# Patient Record
Sex: FEMALE | Race: WHITE | Employment: UNEMPLOYED | URBAN - METROPOLITAN AREA
[De-identification: names, ages, dates, MRNs, and addresses within clinical notes are randomized per-mention and may not be internally consistent; named-entity substitution may affect disease eponyms.]

---

## 2020-05-11 ENCOUNTER — TELEMEDICINE (OUTPATIENT)
Dept: FAMILY MEDICINE CLINIC | Age: 65
End: 2020-05-11
Payer: MEDICARE

## 2020-05-11 PROCEDURE — 99202 OFFICE O/P NEW SF 15 MIN: CPT | Performed by: FAMILY MEDICINE

## 2020-05-11 RX ORDER — LEVOTHYROXINE SODIUM 0.1 MG/1
100 TABLET ORAL DAILY
COMMUNITY
End: 2020-05-11 | Stop reason: SDUPTHER

## 2020-05-11 RX ORDER — HYDROCHLOROTHIAZIDE 25 MG/1
25 TABLET ORAL DAILY
COMMUNITY
End: 2020-05-11 | Stop reason: SDUPTHER

## 2020-05-11 RX ORDER — CITALOPRAM 40 MG/1
40 TABLET ORAL DAILY
Qty: 30 TABLET | Refills: 11 | Status: SHIPPED | OUTPATIENT
Start: 2020-05-11 | End: 2021-01-04 | Stop reason: SDUPTHER

## 2020-05-11 RX ORDER — LISINOPRIL 20 MG/1
40 TABLET ORAL DAILY
COMMUNITY
End: 2020-05-11 | Stop reason: ALTCHOICE

## 2020-05-11 RX ORDER — ATORVASTATIN CALCIUM 20 MG/1
20 TABLET, FILM COATED ORAL DAILY
COMMUNITY
Start: 2019-03-27 | End: 2020-05-11 | Stop reason: SDUPTHER

## 2020-05-11 RX ORDER — HYDROCHLOROTHIAZIDE 25 MG/1
25 TABLET ORAL DAILY
Qty: 30 TABLET | Refills: 11 | Status: SHIPPED | OUTPATIENT
Start: 2020-05-11 | End: 2021-01-04 | Stop reason: SDUPTHER

## 2020-05-11 RX ORDER — ATORVASTATIN CALCIUM 20 MG/1
20 TABLET, FILM COATED ORAL DAILY
Qty: 30 TABLET | Refills: 11 | Status: SHIPPED | OUTPATIENT
Start: 2020-05-11 | End: 2021-01-04 | Stop reason: SDUPTHER

## 2020-05-11 RX ORDER — LEVOTHYROXINE SODIUM 0.1 MG/1
100 TABLET ORAL DAILY
Qty: 30 TABLET | Refills: 11 | Status: SHIPPED | OUTPATIENT
Start: 2020-05-11 | End: 2021-01-04 | Stop reason: SDUPTHER

## 2020-05-11 RX ORDER — CITALOPRAM 40 MG/1
40 TABLET ORAL DAILY
COMMUNITY
End: 2020-05-11 | Stop reason: SDUPTHER

## 2020-08-24 ENCOUNTER — OFFICE VISIT (OUTPATIENT)
Dept: FAMILY MEDICINE CLINIC | Age: 65
End: 2020-08-24
Payer: MEDICARE

## 2020-08-24 VITALS
DIASTOLIC BLOOD PRESSURE: 83 MMHG | TEMPERATURE: 96.4 F | HEART RATE: 77 BPM | BODY MASS INDEX: 31.36 KG/M2 | WEIGHT: 177 LBS | HEIGHT: 63 IN | SYSTOLIC BLOOD PRESSURE: 137 MMHG

## 2020-08-24 PROCEDURE — G8417 CALC BMI ABV UP PARAM F/U: HCPCS | Performed by: FAMILY MEDICINE

## 2020-08-24 PROCEDURE — 4004F PT TOBACCO SCREEN RCVD TLK: CPT | Performed by: FAMILY MEDICINE

## 2020-08-24 PROCEDURE — 3017F COLORECTAL CA SCREEN DOC REV: CPT | Performed by: FAMILY MEDICINE

## 2020-08-24 PROCEDURE — 99213 OFFICE O/P EST LOW 20 MIN: CPT | Performed by: FAMILY MEDICINE

## 2020-08-24 PROCEDURE — G8427 DOCREV CUR MEDS BY ELIG CLIN: HCPCS | Performed by: FAMILY MEDICINE

## 2020-08-24 NOTE — PROGRESS NOTES
Visit Information    Have you changed or started any medications since your last visit including any over-the-counter medicines, vitamins, or herbal medicines? no   Have you stopped taking any of your medications? Is so, why? -  no  Are you having any side effects from any of your medications? - no    Have you seen any other physician or provider since your last visit?  no   Have you had any other diagnostic tests since your last visit?  no   Have you been seen in the emergency room and/or had an admission in a hospital since we last saw you?  no   Have you had your routine dental cleaning in the past 6 months?  no     Do you have an active MyChart account? If no, what is the barrier?   Yes    Patient Care Team:  Zuri Du DO as PCP - General (Family Medicine)    Medical History Review  Past Medical, Family, and Social History reviewed and does not contribute to the patient presenting condition    Health Maintenance   Topic Date Due    Potassium monitoring  1955    Creatinine monitoring  1955    Hepatitis C screen  1955    Lipid screen  10/11/1965    HIV screen  10/11/1970    DTaP/Tdap/Td vaccine (1 - Tdap) 10/11/1974    Cervical cancer screen  10/11/1976    Diabetes screen  10/11/1995    Breast cancer screen  10/11/2005    Shingles Vaccine (1 of 2) 10/11/2005    Colon cancer screen colonoscopy  10/11/2005    Annual Wellness Visit (AWV)  07/24/2020    Flu vaccine (1) 09/01/2020    Hepatitis A vaccine  Aged Out    Hepatitis B vaccine  Aged Out    Hib vaccine  Aged Out    Meningococcal (ACWY) vaccine  Aged Out    Pneumococcal 0-64 years Vaccine  Aged Jeanmarie

## 2020-08-24 NOTE — PROGRESS NOTES
Subjective:      Patient ID: Jo Larson is a 59 y.o. female. HPI    Check up  Patient reports Hx:  Clinical depression many years ago - has been very stable with citalopram  Has been on metformin - 1000 mg. Twice daily for DM   Needs labs caught up  Tobacco user - not ready to quit, but willing to consider it in the future    Review of Systems     Negative for:    Headache  Dizziness  Visual Disturbance  Hearing Changes  Nasal / sinus Symptoms  Mouth / tooth symptom, pain  Throat pain  Difficulty swallowing  Neck pain  Chest discomfort  Cough  SOB  N/V/D/C  Pelvic area discomfort  Bladder / voiding discomfort  Bowel complaints  MS complaints   Numbness/tingling/abnormal sensations   Edema / Leg swelling  Dizziness  Fatigue  Bleeding   Skin    Pertinent Pos: See HPI      Objective:   Physical Exam    Alert and oriented to PPT  NAD    HEENT - neg  Neck - no bruits, no lymphadenopathy  Chest - increased AP carlos consistent with OPD changes  HRRR w/o murmer  LCTAB no wheezes / rhonchi  Abdomen - soft, non-tender, BS  Extremities - 0+ PTE    Gait / Station - stable, no dysequilibrium, uniform pace, no assist device, cane. Assessment:       Diagnosis Orders   1. Diabetes mellitus due to underlying condition with hyperosmolarity without coma, unspecified whether long term insulin use (HCC)  Basic Metabolic Panel    metFORMIN (GLUCOPHAGE) 1000 MG tablet    TSH With Reflex Ft4    Hemoglobin A1C    Microalbumin, Ur   2. Encounter for screening mammogram for breast cancer     3. Screening for hyperlipidemia  Lipid Panel   4.  Mixed hyperlipidemia  Lipid Panel           Plan:      Reordered: metformin  Labs  If A1c is stable, will consider reducing metformin to once daily  Mag in 4 m      DPM  Labs  refill    Laxmi Samaniego DO

## 2020-09-09 ENCOUNTER — TELEPHONE (OUTPATIENT)
Dept: FAMILY MEDICINE CLINIC | Age: 65
End: 2020-09-09

## 2020-09-09 ENCOUNTER — TELEPHONE (OUTPATIENT)
Dept: SURGERY | Age: 65
End: 2020-09-09

## 2020-09-09 NOTE — TELEPHONE ENCOUNTER
Patient called in today asking if she can get a script sent over to her pharmacy for a uti. Patient sated she use over the counter azo and it gave relief but want to make sure she fully get rid of it.

## 2020-09-11 NOTE — TELEPHONE ENCOUNTER
Please return a call to the patient - offer an appointment with first available PGY Resident.     Marquis Barthel, DO

## 2020-09-16 ENCOUNTER — OFFICE VISIT (OUTPATIENT)
Dept: FAMILY MEDICINE CLINIC | Age: 65
End: 2020-09-16
Payer: MEDICARE

## 2020-09-16 ENCOUNTER — HOSPITAL ENCOUNTER (OUTPATIENT)
Age: 65
Setting detail: SPECIMEN
Discharge: HOME | End: 2020-09-16
Payer: MEDICARE

## 2020-09-16 VITALS
TEMPERATURE: 97.2 F | SYSTOLIC BLOOD PRESSURE: 163 MMHG | WEIGHT: 174 LBS | DIASTOLIC BLOOD PRESSURE: 99 MMHG | BODY MASS INDEX: 30.82 KG/M2

## 2020-09-16 PROBLEM — R30.0 DYSURIA: Status: ACTIVE | Noted: 2020-09-16

## 2020-09-16 PROBLEM — N39.0 URINARY TRACT INFECTION WITH HEMATURIA: Status: ACTIVE | Noted: 2020-09-16

## 2020-09-16 PROBLEM — R31.9 URINARY TRACT INFECTION WITH HEMATURIA: Status: ACTIVE | Noted: 2020-09-16

## 2020-09-16 PROBLEM — Z12.11 COLON CANCER SCREENING: Status: ACTIVE | Noted: 2020-09-16

## 2020-09-16 LAB
BILIRUBIN, POC: ABNORMAL
BLOOD URINE, POC: ABNORMAL
CLARITY, POC: ABNORMAL
COLOR, POC: YELLOW
GLUCOSE URINE, POC: NEGATIVE
KETONES, POC: ABNORMAL
LEUKOCYTE EST, POC: NEGATIVE
NITRITE, POC: NEGATIVE
PH, POC: 5.5
PROTEIN, POC: ABNORMAL
SPECIFIC GRAVITY, POC: >=1.03
UROBILINOGEN, POC: 0.2

## 2020-09-16 PROCEDURE — 3017F COLORECTAL CA SCREEN DOC REV: CPT | Performed by: STUDENT IN AN ORGANIZED HEALTH CARE EDUCATION/TRAINING PROGRAM

## 2020-09-16 PROCEDURE — 99213 OFFICE O/P EST LOW 20 MIN: CPT | Performed by: STUDENT IN AN ORGANIZED HEALTH CARE EDUCATION/TRAINING PROGRAM

## 2020-09-16 PROCEDURE — G8427 DOCREV CUR MEDS BY ELIG CLIN: HCPCS | Performed by: STUDENT IN AN ORGANIZED HEALTH CARE EDUCATION/TRAINING PROGRAM

## 2020-09-16 PROCEDURE — 81002 URINALYSIS NONAUTO W/O SCOPE: CPT | Performed by: STUDENT IN AN ORGANIZED HEALTH CARE EDUCATION/TRAINING PROGRAM

## 2020-09-16 PROCEDURE — G8417 CALC BMI ABV UP PARAM F/U: HCPCS | Performed by: STUDENT IN AN ORGANIZED HEALTH CARE EDUCATION/TRAINING PROGRAM

## 2020-09-16 PROCEDURE — 4004F PT TOBACCO SCREEN RCVD TLK: CPT | Performed by: STUDENT IN AN ORGANIZED HEALTH CARE EDUCATION/TRAINING PROGRAM

## 2020-09-16 RX ORDER — NITROFURANTOIN 25; 75 MG/1; MG/1
100 CAPSULE ORAL 2 TIMES DAILY
Qty: 10 CAPSULE | Refills: 0 | Status: SHIPPED | OUTPATIENT
Start: 2020-09-16 | End: 2020-09-21

## 2020-09-16 NOTE — PROGRESS NOTES
Subjective:    Edis Mendez is a 59 y.o. female with  has no past medical history on file. No family history on file. Presented tothe office today for:  Chief Complaint   Patient presents with    Dysuria     patient complains of uti symptoms, she says that for past 2 weeks she has had pressure and painful flow. She has tried azo for treatment with relief. But now she has issues with frequent flow and discomfort in lower back     Hypertension     takes htn medication at night    Health Maintenance     refused vaccines, due for awv, lab work incomplete     HPI  CC: UTI Symptoms  Edis Mendez is a 59 y.o. female who denies any urinary frequency, urgency and dysuria. She has some suprapubic tenderness and with bilateral flank pain. She denies any fever. She had chills a few times. No abnormal vaginal discharge or bleeding. Pain/discomfort is 4/10 in the flanks, Tylenol helps with her pain a little bit. She has a prior history of urinary stones in the past many years ago, and does not feel like it this time It feels very similar to prior UTI she had many years ago. LMP - years ago, unknown. s/p hysterectomy in 2005. No blood in the urine, urinating well at this time. She has been taking pyridium OTC with good relief of symptoms. Review of Systems  Review of Systems   Constitutional: Negative for activity change, appetite change, chills, diaphoresis, fatigue, fever and unexpected weight change. Cardiovascular: Negative for chest pain, palpitations and leg swelling. Gastrointestinal: Negative for abdominal pain, diarrhea, nausea and vomiting. Endocrine: Negative for cold intolerance, polydipsia, polyphagia and polyuria. Genitourinary: Negative for difficulty urinating,  frequency. Suprapubic pain and bilateral flank pain. Skin: Negative for color change and wound. Negative for pallor and rash. Allergic/Immunologic: Negative for environmental allergies and food allergies.    Psychiatric/Behavioral: Negative for sleep disturbance. Negative for confusion and suicidal ideas. Objective:    BP (!) 163/99   Temp 97.2 °F (36.2 °C)   Wt 174 lb (78.9 kg)   BMI 30.82 kg/m²    BP Readings from Last 3 Encounters:   09/16/20 (!) 163/99   08/24/20 137/83       Physical Exam  Constitutional: Patient is oriented to person, place, and time. Patient appears well-developed and well-nourished. No distress. HENT: Head: Normocephalic and atraumatic. Eyes: Pupils are equal, round, and reactive to light. Conjunctivae are normal. Right eye exhibits no discharge. Left eye exhibits no discharge. Cardiovascular: Normal rate, regular rhythm and normal heart sounds. Pulmonary/Chest: Effort normal and breath sounds normal. No respiratory distress. Patient has no wheezes. Abdominal: Soft. Bowel sounds are normal. Patient exhibits no distension. There is no tenderness. Musculoskeletal:  Patient exhibits no edema and tenderness. Patient exhibits no deformity. Neurological: Patient is alert and oriented to person, place, and time. Skin: Skin is warm and dry. Patient is not diaphoretic. Psychiatric: Patient's speech is normal and behavior is normal. Thought content normal. Patient's mood appears anxious. Vitals reviewed. The abdomen is soft withot tenderness, guarding, mass, rebound or organomegaly. No CVA tenderness or inguinal adenopathy noted. No results found for: WBC, HGB, HCT, PLT, CHOL, TRIG, HDL, LDLDIRECT, ALT, AST, NA, K, CL, CREATININE, BUN, CO2, TSH, PSA, INR, GLUF, LABA1C, LABMICR  No results found for: CALCIUM, PHOS  No results found for: LDLCALC, LDLCHOLESTEROL, LDLDIRECT      Assessment and Plan:    1. Urinary tract infection with hematuria, site unspecified/2. Dysuria  - POCT Urinalysis no Micro  - Culture, Urine; Future, Patient will be updated with results of the urine culture, modifying tx as needed. - nitrofurantoin, macrocrystal-monohydrate, (MACROBID) 100 MG capsule;  Take 1 capsule by mouth 2 times daily for 5 days  Dispense: 10 capsule; Refill: 0    3. Colon cancer screening  - Cologuard (For External Results Only); Future    Patient will obtain all of her labs that were ordered prior to determine any changes to Cr. Treatment per orders - also push fluids. Call or return to clinic/ED or 911/EMS prn if these symptoms worsen or fail to improve as anticipated. Requested Prescriptions     Signed Prescriptions Disp Refills    nitrofurantoin, macrocrystal-monohydrate, (MACROBID) 100 MG capsule 10 capsule 0     Sig: Take 1 capsule by mouth 2 times daily for 5 days       There are no discontinued medications. Sheryl Saavedra received counseling on the following healthy behaviors: nutrition, exercise and medication adherence    Discussed use, benefit, and side effects of prescribed medications. Barriers to medication compliance addressed. All patient questions answered. Pt voiced understanding, with use of teach-back method. Return in about 2 weeks (around 9/30/2020), or if symptoms worsen or fail to improve, for f/u UTI. Patient had a total hysterectomy (2/2 fibromas), cervix removed, does not follow with an OBGYN at this time.   Patient has an elevated blood pressure reading today, she is currently on antihypertensive medications, states that she took them all, we will repeat the blood pressure during the next appointment and if the blood pressures are improving we will increase and/or add a second medication to control her blood pressure

## 2020-09-16 NOTE — PROGRESS NOTES
Visit Information    Have you changed or started any medications since your last visit including any over-the-counter medicines, vitamins, or herbal medicines? no   Have you stopped taking any of your medications? Is so, why? -  no  Are you having any side effects from any of your medications? - no    Have you seen any other physician or provider since your last visit?  no   Have you had any other diagnostic tests since your last visit?  no   Have you been seen in the emergency room and/or had an admission in a hospital since we last saw you?  no   Have you had your routine dental cleaning in the past 6 months?  no     Do you have an active MyChart account? If no, what is the barrier?   Yes    Patient Care Team:  Francie Koehler DO as PCP - General (Family Medicine)  Francie Koehler DO as PCP - [facility]    Medical History Review  Past Medical, Family, and Social History reviewed and does not contribute to the patient presenting condition    Health Maintenance   Topic Date Due    Potassium monitoring  1955    Creatinine monitoring  1955    Hepatitis C screen  1955    Pneumococcal 0-64 years Vaccine (1 of 1 - PPSV23) 10/11/1961    Lipid screen  10/11/1965    HIV screen  10/11/1970    DTaP/Tdap/Td vaccine (1 - Tdap) 10/11/1974    Cervical cancer screen  10/11/1976    Diabetes screen  10/11/1995    Breast cancer screen  10/11/2005    Shingles Vaccine (1 of 2) 10/11/2005    Colon cancer screen colonoscopy  10/11/2005    Annual Wellness Visit (AWV)  07/24/2020    Flu vaccine (1) 09/01/2020    Hepatitis A vaccine  Aged Out    Hepatitis B vaccine  Aged Out    Hib vaccine  Aged Out    Meningococcal (ACWY) vaccine  Aged Out

## 2020-09-17 ENCOUNTER — HOSPITAL ENCOUNTER (OUTPATIENT)
Age: 65
Setting detail: SPECIMEN
Discharge: HOME | End: 2020-09-17
Payer: MEDICARE

## 2020-09-17 LAB
ANION GAP SERPL CALCULATED.3IONS-SCNC: 13 MMOL/L (ref 9–17)
BUN BLDV-MCNC: 13 MG/DL (ref 8–23)
BUN/CREAT BLD: ABNORMAL (ref 9–20)
CALCIUM SERPL-MCNC: 9.6 MG/DL (ref 8.6–10.4)
CHLORIDE BLD-SCNC: 100 MMOL/L (ref 98–107)
CHOLESTEROL/HDL RATIO: 4.8
CHOLESTEROL: 182 MG/DL
CO2: 25 MMOL/L (ref 20–31)
CREAT SERPL-MCNC: 0.51 MG/DL (ref 0.5–0.9)
CREATININE URINE: 226.1 MG/DL (ref 28–217)
ESTIMATED AVERAGE GLUCOSE: 209 MG/DL
GFR AFRICAN AMERICAN: >60 ML/MIN
GFR NON-AFRICAN AMERICAN: >60 ML/MIN
GFR SERPL CREATININE-BSD FRML MDRD: ABNORMAL ML/MIN/{1.73_M2}
GFR SERPL CREATININE-BSD FRML MDRD: ABNORMAL ML/MIN/{1.73_M2}
GLUCOSE BLD-MCNC: 200 MG/DL (ref 70–99)
HBA1C MFR BLD: 8.9 % (ref 4–6)
HDLC SERPL-MCNC: 38 MG/DL
LDL CHOLESTEROL DIRECT: 57 MG/DL
LDL CHOLESTEROL: ABNORMAL MG/DL (ref 0–130)
MICROALBUMIN/CREAT 24H UR: 649 MG/L
MICROALBUMIN/CREAT UR-RTO: 287 MCG/MG CREAT
POTASSIUM SERPL-SCNC: 4 MMOL/L (ref 3.7–5.3)
SODIUM BLD-SCNC: 138 MMOL/L (ref 135–144)
TRIGL SERPL-MCNC: 452 MG/DL
TSH SERPL DL<=0.05 MIU/L-ACNC: 4.94 MIU/L (ref 0.3–5)
VLDLC SERPL CALC-MCNC: ABNORMAL MG/DL (ref 1–30)

## 2020-09-17 NOTE — PROGRESS NOTES
Attending Physician Statement    Wt Readings from Last 3 Encounters:   09/16/20 174 lb (78.9 kg)   08/24/20 177 lb (80.3 kg)     Temp Readings from Last 3 Encounters:   09/16/20 97.2 °F (36.2 °C)   08/24/20 96.4 °F (35.8 °C) (Temporal)     BP Readings from Last 3 Encounters:   09/16/20 (!) 163/99   08/24/20 137/83     Pulse Readings from Last 3 Encounters:   08/24/20 77         I have discussed the care of [facility], including pertinent history and exam findings with the resident. I have reviewed the key elements of all parts of the encounter with the resident. I agree with the assessment, plan and orders as documented by the resident.   (GE Modifier)

## 2020-09-18 LAB
CULTURE: NORMAL
Lab: NORMAL
SPECIMEN DESCRIPTION: NORMAL

## 2020-10-02 ENCOUNTER — OFFICE VISIT (OUTPATIENT)
Dept: FAMILY MEDICINE CLINIC | Age: 65
End: 2020-10-02
Payer: MEDICARE

## 2020-10-02 VITALS
WEIGHT: 175.2 LBS | SYSTOLIC BLOOD PRESSURE: 148 MMHG | BODY MASS INDEX: 31.04 KG/M2 | HEIGHT: 63 IN | HEART RATE: 82 BPM | TEMPERATURE: 97 F | DIASTOLIC BLOOD PRESSURE: 98 MMHG

## 2020-10-02 PROCEDURE — 99211 OFF/OP EST MAY X REQ PHY/QHP: CPT | Performed by: FAMILY MEDICINE

## 2020-10-02 PROCEDURE — 3017F COLORECTAL CA SCREEN DOC REV: CPT | Performed by: FAMILY MEDICINE

## 2020-10-02 PROCEDURE — G0009 ADMIN PNEUMOCOCCAL VACCINE: HCPCS | Performed by: FAMILY MEDICINE

## 2020-10-02 PROCEDURE — G8417 CALC BMI ABV UP PARAM F/U: HCPCS | Performed by: FAMILY MEDICINE

## 2020-10-02 PROCEDURE — G8482 FLU IMMUNIZE ORDER/ADMIN: HCPCS | Performed by: FAMILY MEDICINE

## 2020-10-02 PROCEDURE — 4004F PT TOBACCO SCREEN RCVD TLK: CPT | Performed by: FAMILY MEDICINE

## 2020-10-02 PROCEDURE — 90715 TDAP VACCINE 7 YRS/> IM: CPT | Performed by: FAMILY MEDICINE

## 2020-10-02 PROCEDURE — 99213 OFFICE O/P EST LOW 20 MIN: CPT | Performed by: FAMILY MEDICINE

## 2020-10-02 PROCEDURE — G8427 DOCREV CUR MEDS BY ELIG CLIN: HCPCS | Performed by: FAMILY MEDICINE

## 2020-10-02 PROCEDURE — G0008 ADMIN INFLUENZA VIRUS VAC: HCPCS | Performed by: FAMILY MEDICINE

## 2020-10-02 NOTE — PROGRESS NOTES
Visit Information    Have you changed or started any medications since your last visit including any over-the-counter medicines, vitamins, or herbal medicines? no   Have you stopped taking any of your medications? Is so, why? -  no  Are you having any side effects from any of your medications? - no    Have you seen any other physician or provider since your last visit?  no   Have you had any other diagnostic tests since your last visit? yes - Labs   Have you been seen in the emergency room and/or had an admission in a hospital since we last saw you?  no   Have you had your routine dental cleaning in the past 6 months?  no     Do you have an active MyChart account? If no, what is the barrier?   Yes    Patient Care Team:  Romelia Hahn DO as PCP - General (Family Medicine)  Romelia Hahn DO as PCP - [facility] Provider    Medical History Review  Past Medical, Family, and Social History reviewed and does not contribute to the patient presenting condition    Health Maintenance   Topic Date Due    Hepatitis C screen  1955    Pneumococcal 0-64 years Vaccine (1 of 1 - PPSV23) 10/11/1961    Diabetic foot exam  10/11/1965    Diabetic retinal exam  10/11/1965    HIV screen  10/11/1970    DTaP/Tdap/Td vaccine (1 - Tdap) 10/11/1974    Cervical cancer screen  10/11/1976    Breast cancer screen  10/11/2005    Shingles Vaccine (1 of 2) 10/11/2005    Colon cancer screen colonoscopy  10/11/2005    Annual Wellness Visit (AWV)  07/24/2020    Flu vaccine (1) 09/01/2020    A1C test (Diabetic or Prediabetic)  09/17/2021    Diabetic microalbuminuria test  09/17/2021    Lipid screen  09/17/2021    Potassium monitoring  09/17/2021    Creatinine monitoring  09/17/2021    Hepatitis A vaccine  Aged Out    Hib vaccine  Aged Out    Meningococcal (ACWY) vaccine  Aged Out       Vaccine Information Sheet, \"Influenza - Inactivated\"  given to Amanda, or parent/legal guardian of  Amanda and verbalized understanding. Patient responses:    Have you ever had a reaction to a flu vaccine? No  Do you have any current illness? No  Have you ever had [place] Syndrome? No  Do you have a serious allergy to any of the following: Neomycin, Polymyxin, Thimerosal, eggs or egg products? No    Flu vaccine given per order. Please see immunization tab. Risks and benefits explained. Current VIS given.

## 2020-10-02 NOTE — PATIENT INSTRUCTIONS
Thank you for letting us take care of you today. We hope all your questions were addressed. If a question was overlooked or something else comes to mind after you return home, please contact a member of your Care Team listed below. Your Care Team at [place] [address] is Team #2  Lisa Álvarez DO (Faculty)  Tati Fishman (Faculty)  Seward Buerger, MD (Resident)  Stephany Crowley MD (Resident)  Alissa Cerna MD (Resident)  Ayush Alegria MD (Resident)  Fatoumata Calderon MD (Resident)  HIGINIO Reddy ,Deondre Martinbettei ([address] office)  Janet Guzman, [address] (Clinical Practice Manager)  Johan Hawkins [facility] (Clinical Pharmacist)     Office phone number: [phone]    If you need to get in right away due to illness, please be advised we have \"Same Day\" appointments available Monday-Friday. Please call us at [phone] option #3 to schedule your \"Same Day\" appointment. Patient Education        Tdap (Tetanus, Diphtheria, Pertussis) Vaccine: What You Need to Know  Why get vaccinated? Tdap vaccine can prevent tetanus, diphtheria, and pertussis. Diphtheria and pertussis spread from person to person. Tetanus enters the body through cuts or wounds. · TETANUS (T) causes painful stiffening of the muscles. Tetanus can lead to serious health problems, including being unable to open the mouth, having trouble swallowing and breathing, or death. · DIPHTHERIA (D) can lead to difficulty breathing, heart failure, paralysis, or death. · PERTUSSIS (aP), also known as \"whooping cough,\" can cause uncontrollable, violent coughing which makes it hard to breathe, eat, or drink. Pertussis can be extremely serious in babies and young children, causing pneumonia, convulsions, brain damage, or death. In teens and adults, it can cause weight loss, loss of bladder control, passing out, and rib fractures from severe coughing.   Tdap vaccine  Tdap is only for children 7 years and older, adolescents, and adults. Adolescents should receive a single dose of Tdap, preferably at age 6 or 15 years. Pregnant women should get a dose of Tdap during every pregnancy, to protect the  from pertussis. Infants are most at risk for severe, life threatening complications from pertussis. Adults who have never received Tdap should get a dose of Tdap. Also, adults should receive a booster dose every 10 years, or earlier in the case of a severe and dirty wound or burn. Booster doses can be either Tdap or Td (a different vaccine that protects against tetanus and diphtheria but not pertussis). Tdap may be given at the same time as other vaccines. Talk with your health care provider  Tell your vaccine provider if the person getting the vaccine:  · Has had an allergic reaction after a previous dose of any vaccine that protects against tetanus, diphtheria, or pertussis, or has any severe, life threatening allergies. · Has had a coma, decreased level of consciousness, or prolonged seizures within 7 days after a previous dose of any pertussis vaccine (DTP, DTaP, or Tdap). · Has seizures or another nervous system problem. · Has ever had Guillain-Barré Syndrome (also called GBS). · Has had severe pain or swelling after a previous dose of any vaccine that protects against tetanus or diphtheria. In some cases, your health care provider may decide to postpone Tdap vaccination to a future visit. People with minor illnesses, such as a cold, may be vaccinated. People who are moderately or severely ill should usually wait until they recover before getting Tdap vaccine. Your health care provider can give you more information. Risks of a vaccine reaction  · Pain, redness, or swelling where the shot was given, mild fever, headache, feeling tired, and nausea, vomiting, diarrhea, or stomachache sometimes happen after Tdap vaccine. People sometimes faint after medical procedures, including vaccination.  Tell your provider if you feel dizzy or have vision changes or ringing in the ears. As with any medicine, there is a very remote chance of a vaccine causing a severe allergic reaction, other serious injury, or death. What if there is a serious problem? An allergic reaction could occur after the vaccinated person leaves the clinic. If you see signs of a severe allergic reaction (hives, swelling of the face and throat, difficulty breathing, a fast heartbeat, dizziness, or weakness), call 9-1-1 and get the person to the nearest hospital.  For other signs that concern you, call your health care provider. Adverse reactions should be reported to the Vaccine Adverse Event Reporting System (VAERS). Your health care provider will usually file this report, or you can do it yourself. Visit the VAERS website at www.vaers. hhs.gov or call 6-278.610.9344. VAERS is only for reporting reactions, and VAERS staff do not give medical advice. The National Vaccine Injury Compensation Program  The National Vaccine Injury Compensation Program (VICP) is a federal program that was created to compensate people who may have been injured by certain vaccines. Visit the VICP website at www.hrsa.gov/vaccinecompensation or call 9-366.150.9394 to learn about the program and about filing a claim. There is a time limit to file a claim for compensation. How can I learn more? · Ask your health care provider. · Call your local or state health department. · Contact the Centers for Disease Control and Prevention (CDC):  ? Call 4-480.899.6535 (1-800-CDC-INFO) or  ? Visit CDC's website at www.cdc.gov/vaccines  Vaccine Information Statement (Interim)  Tdap (Tetanus, Diphtheria, Pertussis) Vaccine  04/01/2020  [id number]  Department of Health and Human Services  Centers for Disease Control and Prevention  Many Vaccine Information Statements are available in Welsh and other languages. See www.immunize.org/vis.   Muchas hojas de información sobre vacunas están disponibles en español y en otros idiomas. Visite www.immunize.org/vis. Care instructions adapted under license by [facility] ([facility]). If you have questions about a medical condition or this instruction, always ask your healthcare professional. [address] any warranty or liability for your use of this information. Patient Education        Influenza (Flu) Vaccine (Inactivated or Recombinant): What You Need to Know  Why get vaccinated? Influenza vaccine can prevent influenza (flu). Flu is a contagious disease that spreads around the [place] every year, usually between October and May. Anyone can get the flu, but it is more dangerous for some people. Infants and young children, people 72years of age and older, pregnant women, and people with certain health conditions or a weakened immune system are at greatest risk of flu complications. Pneumonia, bronchitis, sinus infections and ear infections are examples of flu-related complications. If you have a medical condition, such as heart disease, cancer or diabetes, flu can make it worse. Flu can cause fever and chills, sore throat, muscle aches, fatigue, cough, headache, and runny or stuffy nose. Some people may have vomiting and diarrhea, though this is more common in children than adults. Each year, thousands of people in the [facility] die from flu, and many more are hospitalized. Flu vaccine prevents millions of illnesses and flu-related visits to the doctor each year. Influenza vaccine  CDC recommends everyone 10months of age and older get vaccinated every flu season. Children 6 months through 6years of age may need 2 doses during a single flu season. Everyone else needs only 1 dose each flu season. It takes about 2 weeks for protection to develop after vaccination. There are many flu viruses, and they are always changing.  Each year a new flu vaccine is made to protect against three or four viruses that are likely to cause disease in the upcoming flu season. Even when the vaccine doesn't exactly match these viruses, it may still provide some protection. Influenza vaccine does not cause flu. Influenza vaccine may be given at the same time as other vaccines. Talk with your health care provider  Tell your vaccine provider if the person getting the vaccine:  · Has had an allergic reaction after a previous dose of influenza vaccine, or has any severe, life-threatening allergies. · Has ever had Guillain-Barré Syndrome (also called GBS). In some cases, your health care provider may decide to postpone influenza vaccination to a future visit. People with minor illnesses, such as a cold, may be vaccinated. People who are moderately or severely ill should usually wait until they recover before getting influenza vaccine. Your health care provider can give you more information. Risks of a vaccine reaction  · Soreness, redness, and swelling where shot is given, fever, muscle aches, and headache can happen after influenza vaccine. · There may be a very small increased risk of Guillain-Barré Syndrome (GBS) after inactivated influenza vaccine (the flu shot). [employer] children who get the flu shot along with pneumococcal vaccine (PCV13), and/or DTaP vaccine at the same time might be slightly more likely to have a seizure caused by fever. Tell your health care provider if a child who is getting flu vaccine has ever had a seizure. People sometimes faint after medical procedures, including vaccination. Tell your provider if you feel dizzy or have vision changes or ringing in the ears. As with any medicine, there is a very remote chance of a vaccine causing a severe allergic reaction, other serious injury, or death. What if there is a serious problem? An allergic reaction could occur after the vaccinated person leaves the clinic.  If you see signs of a severe allergic reaction (hives, swelling of the face and throat, difficulty breathing, a fast heartbeat, dizziness, or weakness), call 9-1-1 and get the person to the nearest hospital.  For other signs that concern you, call your health care provider. Adverse reactions should be reported to the Vaccine Adverse Event Reporting System (VAERS). Your health care provider will usually file this report, or you can do it yourself. Visit the VAERS website at www.vaers. [facility].gov or call 7-747.223.4215. VAERS is only for reporting reactions, and VAERS staff do not give medical advice. The National Vaccine Injury Compensation Program  The National Vaccine Injury Compensation Program (VICP) is a federal program that was created to compensate people who may have been injured by certain vaccines. Visit the VICP website at www.[facility].gov/vaccinecompensation or call 9-332.265.1236 to learn about the program and about filing a claim. There is a time limit to file a claim for compensation. How can I learn more? · Ask your healthcare provider. · Call your local or state health department. · Contact the Centers for Disease Control and Prevention (CDC):  ? Call 2-926.967.2985 (1-800-CDC-INFO) or  ? Visit CDC's website at www.cdc.gov/flu  Vaccine Information Statement (Interim)  Inactivated Influenza Vaccine  8/15/2019  [address] [id number]  Department of Health and Human Services  Centers for Disease Control and Prevention  Many Vaccine Information Statements are available in Samoan and other languages. See www.immunize.org/vis. Muchas hojas de información sobre vacunas están disponibles en español y en otros idiomas. Visite www.immunize.org/vis. Care instructions adapted under license by [facility] ([facility]). If you have questions about a medical condition or this instruction, always ask your healthcare professional. [address] any warranty or liability for your use of this information.          Patient Education        pneumococcal polysaccharides vaccine (PPSV), 23-valent  Pronunciation:  GLEN Underwood harmony, Selene-ENEDINA pittman  Brand:  Pneumovax 23  What is the most important information I should know about this vaccine? PPSV should be given at least 2 weeks before the start of any treatment that can weaken your immune system. PPSV is also given at least 2 weeks before you undergo a splenectomy (surgical removal of the spleen). The timing of this vaccination is very important for it to be effective. Follow your doctor's instructions. You can still receive a vaccine if you have a cold or fever. In the case of a more severe illness with a fever or any type of infection, wait until you get better before receiving this vaccine. You should not receive a booster vaccine if you had a life-threatening allergic reaction after the first shot. Keep track of any and all side effects you have after receiving this vaccine. If you ever need to receive a booster dose, you will need to tell your doctor if the previous shot caused any side effects. Becoming infected with pneumococcal disease (such as pneumonia or meningitis) is much more dangerous to your health than receiving this vaccine. However, like any medicine, this vaccine can cause side effects but the risk of serious side effects is extremely low. What is pneumococcal polysaccharides vaccine (PPSV)? Pneumococcal disease is a serious infection caused by a bacteria. Pneumococcal bacteria can infect the sinuses and inner ear. It can also infect the lungs, blood, and brain and these conditions can be fatal.  Pneumococcal polysaccharides vaccine (PPSV) is used to prevent infection caused by pneumococcal bacteria. PPSV contains 23 of the most common types of pneumococcal bacteria. PPSV works by exposing you to a small dose of the bacteria or a protein from the bacteria, which causes your body to develop immunity to the disease. PPSV will not treat an active infection that has already developed in the body.   PPSV is for use only in adults and children who are at least 2 years doctor if you are breast-feeding a baby. How is this vaccine given? PPSV is given as an injection (shot) under the skin or into a muscle of your arm or thigh. You will receive this injection in a doctor's office or other clinic setting. PPSV is usually given as a routine vaccination in adults who are 72 years and older. PPSV may also be given to people between the ages 3and 59years old who have:  · heart disease, lung disease, or diabetes;  · a cerebrospinal fluid leak, or a cochlear implant (an electronic hearing device);  · alcoholism or liver disease (including cirrhosis);  · sickle cell disease or a disorder of the spleen;  · a weak immune system caused by HIV, AIDS, cancer, kidney failure, organ transplantation, or a damaged spleen; or  · a weak immune system caused by taking steroids or receiving chemotherapy or radiation treatment. PPSV may also be given to people between the ages 23and 59years old who smoke or have asthma. PPSV should be given at least 2 weeks before the start of any treatment that can weaken your immune system. PPSV is also given at least 2 weeks before you undergo a splenectomy (surgical removal of the spleen). The timing of this vaccination is very important for it to be effective. Follow your doctor's instructions. Your doctor may recommend treating fever and pain with an aspirin-free pain reliever such as acetaminophen (Tylenol) or ibuprofen (Motrin, Advil, and others) when the shot is given and for the next 24 hours. Follow the label directions or your doctor's instructions about how much of this medicine to take. If your doctor has prescribed an antibiotic (such as penicillin) to help prevent infection with pneumococcal bacteria, do not stop using the antibiotic after you receive the PPSV. Take the antibiotic for the entire length of time prescribed by your doctor. Most people receive only one PPSV shot during their lifetime.  However, people in certain age groups or with vision, speech, swallowing, or bladder and bowel functions; or  · slow heart rate, trouble breathing, feeling like you might pass out. Less serious side effects are more likely to occur, such as:  · low fever (102 degrees or less), chills, tired feeling;  · swelling, pain, tenderness, or redness anywhere on your body;  · headache, nausea, vomiting;  · joint or muscle pain;  · swelling or stiffness in the arm or leg the vaccine was injected into;  · mild skin rash; or  · mild soreness, warmth, redness, swelling, or a hard lump where the shot was given. This is not a complete list of side effects and others may occur. Call your doctor for medical advice about side effects. You may report vaccine side effects to the [place] [address] and Human Services at 7-692.614.5485. What other drugs will affect pneumococcal polysaccharides vaccine (PPSV)? Before receiving this vaccine, tell the doctor about all other vaccines you have recently received. Also tell the doctor if you have recently received drugs or treatments that can weaken the immune system, including:  · an oral, nasal, inhaled, or injectable steroid medicine;  · medications to treat psoriasis, rheumatoid arthritis, or other autoimmune disorders, such as azathioprine (Imuran), etanercept (Enbrel), leflunomide ([address]), and others; or  · medicines to treat or prevent organ transplant rejection, such as basiliximab (Simulect), cyclosporine (Sandimmune, Neoral, Gengraf), muromonab-CD3 (Orthoclone), mycophenolate mofetil (CellCept), sirolimus (Rapamune), or tacrolimus (Prograf). If you are using any of these medications, you may not be able to receive the vaccine, or may need to wait until the other treatments are finished. This list is not complete and other drugs may interact with PPSV. Tell your doctor about all medications you use. This includes prescription, over-the-counter, vitamin, and herbal products.  Do not start a new medication without telling your doctor. Where can I get more information? Your doctor or pharmacist may have additional information about pneumococcal polysaccharides vaccine. You may also find additional information from your local health department or the Centers for Disease Control and Prevention. Remember, keep this and all other medicines out of the reach of children, never share your medicines with others, and use this medication only for the indication prescribed. Every effort has been made to ensure that the information provided by [address] is accurate, up-to-date, and complete, but no guarantee is made to that effect. Drug information contained herein may be time sensitive. [facility] information has been compiled for use by healthcare practitioners and consumers in the [place] and therefore [facility] does not warrant that uses outside of the [place] are appropriate, unless specifically indicated otherwise. [facility]'s drug information does not endorse drugs, diagnose patients or recommend therapy. [facility]'s drug information is an informational resource designed to assist licensed healthcare practitioners in caring for their patients and/or to serve consumers viewing this service as a supplement to, and not a substitute for, the expertise, skill, knowledge and judgment of healthcare practitioners. The absence of a warning for a given drug or drug combination in no way should be construed to indicate that the drug or drug combination is safe, effective or appropriate for any given patient. [facility] does not assume any responsibility for any aspect of healthcare administered with the aid of information [facility] provides. The information contained herein is not intended to cover all possible uses, directions, precautions, warnings, drug interactions, allergic reactions, or adverse effects.  If you have questions about the drugs you are taking, check with your doctor, nurse or pharmacist.  Copyright 3064-6284 1 [employer] Version: 5.02. Revision date: 10/17/2012. Care instructions adapted under license by [facility] ([facility]). If you have questions about a medical condition or this instruction, always ask your healthcare professional. [address] any warranty or liability for your use of this information.

## 2020-10-02 NOTE — PROGRESS NOTES
Subjective:      Patient ID: Prabhu Braxton is a 59 y.o. female. HPI    Check up  HM update  Discussed stressors    Review of Systems     Negative for:     Worry / mood complaints  Headache  Dizziness  Visual Disturbance  Hearing Changes  Nasal / sinus Symptoms  Mouth / tooth symptom, pain  Throat pain  Difficulty swallowing  Neck pain  Chest discomfort  Cough  SOB  N/V/D/C  Pelvic area discomfort  Bladder / voiding discomfort  Bowel complaints  MS complaints   Numbness/tingling/abnormal sensations   Edema / Leg swelling  Dizziness  Fatigue  Bleeding   Skin    Pertinent Pos: See HPI      Objective:   Physical Exam    Alert and oriented to PPT  NAD    HEENT - neg  Neck - no bruits, no lymphadenopathy  Chest  HRRR w/o murmer  LCTAB no wheezes / rhonchi  Abdomen - soft, non-tender, BS  Extremities - 0+ PTE    Gait / Station - stable, no dysequilibrium, uniform pace, no assist device, cane. Assessment:       Diagnosis Orders   1. Essential hypertension     2. Fatigue due to excessive exertion, sequela     3. Needs flu shot  INFLUENZA, QUADV, 3 YRS AND OLDER, IM PF, PREFILL SYR OR SDV, 0.5ML (AFLURIA QUADV, PF)   4. Need for pneumococcal vaccine  Pneumococcal polysaccharide vaccine 23-valent greater than or equal to 1yo subcutaneous/IM   5. Need for Tdap vaccination  Tdap (age 6y and older) IM (Boostrix)           Plan:      See orders.       Recheck office visit - 0-3 m           Bri Stallings DO

## 2020-10-16 PROBLEM — Z12.11 COLON CANCER SCREENING: Status: RESOLVED | Noted: 2020-09-16 | Resolved: 2020-10-16

## 2020-10-19 ENCOUNTER — OFFICE VISIT (OUTPATIENT)
Dept: FAMILY MEDICINE CLINIC | Age: 65
End: 2020-10-19
Payer: MEDICARE

## 2020-10-19 VITALS
WEIGHT: 174 LBS | TEMPERATURE: 96.9 F | RESPIRATION RATE: 18 BRPM | DIASTOLIC BLOOD PRESSURE: 84 MMHG | HEIGHT: 62 IN | HEART RATE: 80 BPM | BODY MASS INDEX: 32.02 KG/M2 | OXYGEN SATURATION: 99 % | SYSTOLIC BLOOD PRESSURE: 138 MMHG

## 2020-10-19 PROCEDURE — 4040F PNEUMOC VAC/ADMIN/RCVD: CPT | Performed by: FAMILY MEDICINE

## 2020-10-19 PROCEDURE — 1123F ACP DISCUSS/DSCN MKR DOCD: CPT | Performed by: FAMILY MEDICINE

## 2020-10-19 PROCEDURE — G8482 FLU IMMUNIZE ORDER/ADMIN: HCPCS | Performed by: FAMILY MEDICINE

## 2020-10-19 PROCEDURE — 3017F COLORECTAL CA SCREEN DOC REV: CPT | Performed by: FAMILY MEDICINE

## 2020-10-19 PROCEDURE — G0439 PPPS, SUBSEQ VISIT: HCPCS | Performed by: FAMILY MEDICINE

## 2020-10-19 ASSESSMENT — LIFESTYLE VARIABLES: HOW OFTEN DO YOU HAVE A DRINK CONTAINING ALCOHOL: 0

## 2020-10-19 ASSESSMENT — PATIENT HEALTH QUESTIONNAIRE - PHQ9
SUM OF ALL RESPONSES TO PHQ9 QUESTIONS 1 & 2: 2
SUM OF ALL RESPONSES TO PHQ QUESTIONS 1-9: 2
1. LITTLE INTEREST OR PLEASURE IN DOING THINGS: 1
SUM OF ALL RESPONSES TO PHQ QUESTIONS 1-9: 2
SUM OF ALL RESPONSES TO PHQ QUESTIONS 1-9: 2
2. FEELING DOWN, DEPRESSED OR HOPELESS: 1

## 2020-10-19 NOTE — PROGRESS NOTES
mass index is 31.83 kg/m². Based upon direct observation of the patient, evaluation of cognition reveals recent and remote memory intact. Patient's complete Health Risk Assessment and screening values have been reviewed and are found in Flowsheets. The following problems were reviewed today and where indicated follow up appointments were made and/or referrals ordered. Positive Risk Factor Screenings with Interventions:     Substance History:  Social History     Tobacco History     Smoking Status  Current Some Day Smoker Smoking Frequency  0.5 packs/day for 52 years (26 pk yrs) Smoking Tobacco Type  Cigarettes    Smokeless Tobacco Use  Never Used          Alcohol History     Alcohol Use Status  Not Asked          Drug Use     Drug Use Status  Not Asked          Sexual Activity     Sexually Active  Not Asked               Alcohol Screening:       A score of 8 or more is associated with harmful or hazardous drinking. A score of 13 or more in women, and 15 or more in men, is likely to indicate alcohol dependence. Substance Abuse Interventions:  · Tobacco abuse:  tobacco cessation tips and resources provided    General Health and ACP:  General  In general, how would you say your health is?: (!) Poor(soreness in her entire body chronic)  In the past 7 days, have you experienced any of the following?  New or Increased Pain, New or Increased Fatigue, Loneliness, Social Isolation, Stress or Anger?: (!) New or Increased Fatigue, Stress(chronic fatique and son in rehab)  Do you get the social and emotional support that you need?: (!) No(from friends  sometimes)  Do you have a Living Will?: (!) No(copy given to complete)  Advance Directives     Power of [facility] Will ACP-Advance Directive ACP-Power of     Not on File Not on File Filed [address] [facility] [place] Risk Interventions:  · No Living Will:  Copy given will bring back     Health Habits/Nutrition:  Health Habits/Nutrition  Do you exercise for at least 20 minutes 2-3 times per week?: Yes(walking dogs)  Have you lost any weight without trying in the past 3 months?: No  Do you eat fewer than 2 meals per day?: No  Have you seen a dentist within the past year?: (!) No(list given)  Body mass index: (!) 31.82  Health Habits/Nutrition Interventions:  · Dental exam overdue:  patient encouraged to make appointment with his/her dentist    Hearing/Vision:  No exam data present  Hearing/Vision  Do you or your family notice any trouble with your hearing?: (!) Yes  Do you have difficulty driving, watching TV, or doing any of your daily activities because of your eyesight?: No  Have you had an eye exam within the past year?: (!) No(list given)  Hearing/Vision Interventions:  · Vision concerns:  patient encouraged to make appointment with his/her eye specialist    Personalized Preventive Plan   Current Health Maintenance Status  Immunization History   Administered Date(s) Administered    Influenza, Quadv, IM, PF (6 mo and older Fluzone, Flulaval, Fluarix, and 3 yrs and older Afluria) 10/02/2020    Pneumococcal Polysaccharide ([id number]) 10/02/2020    Tdap (Boostrix, Adacel) 10/02/2020        Health Maintenance   Topic Date Due    Hepatitis C screen  1955    Diabetic foot exam  10/11/1965    Diabetic retinal exam  10/11/1965    HIV screen  10/11/1970    Cervical cancer screen  10/11/1976    Breast cancer screen  10/11/2005    Shingles Vaccine (1 of 2) 10/11/2005    Colon cancer screen colonoscopy  10/11/2005    DEXA (modify frequency per FRAX score)  10/11/2010    Annual Wellness Visit (AWV)  07/24/2020    A1C test (Diabetic or Prediabetic)  09/17/2021    Diabetic microalbuminuria test  09/17/2021    Lipid screen  09/17/2021    Potassium monitoring  09/17/2021    Creatinine monitoring  09/17/2021    Pneumococcal 65+ years Vaccine (1 of 1 - PPSV23) 10/02/2025    DTaP/Tdap/Td vaccine (2 - Td) 10/02/2030    Flu vaccine  Completed    Hepatitis A vaccine  Aged Out    Hib vaccine  Aged Out    Meningococcal (ACWY) vaccine  Aged Out     Recommendations for advisorCONNECT Due: see orders and patient instructions/AVS.  . Recommended screening schedule for the next 5-10 years is provided to the patient in written form: see Patient Instructions/AVS.    Luis Marrero LPN, [id number]/[id number], performed the documented evaluation under the direct supervision of the attending physician.

## 2020-10-19 NOTE — PATIENT INSTRUCTIONS
Patient Education        Learning About Benefits From Quitting Smoking  How does quitting smoking make you healthier? If you're thinking about quitting smoking, you may have a few reasons to be smoke-free. Your health may be one of them. · When you quit smoking, you lower your risks for cancer, lung disease, heart attack, stroke, blood vessel disease, and blindness from macular degeneration. · When you're smoke-free, you get sick less often, and you heal faster. You are less likely to get colds, flu, bronchitis, and pneumonia. · As a nonsmoker, you may find that your mood is better and you are less stressed. When and how will you feel healthier? Quitting has real health benefits that start from day 1 of being smoke-free. And the longer you stay smoke-free, the healthier you get and the better you feel. The first hours  · After just 20 minutes, your blood pressure and heart rate go down. That means there's less stress on your heart and blood vessels. · Within 12 hours, the level of carbon monoxide in your blood drops back to normal. That makes room for more oxygen. With more oxygen in your body, you may notice that you have more energy than when you smoked. After 2 weeks  · Your lungs start to work better. · Your risk of heart attack starts to drop. After 1 month  · When your lungs are clear, you cough less and breathe deeper, so it's easier to be active. · Your sense of taste and smell return. That means you can enjoy food more than you have since you started smoking. Over the years  · Over the years, your risks of heart disease, heart attack, and stroke are lower. · After 10 years, your risk of dying from lung cancer is cut by about half. And your risk for many other types of cancer is lower too. How would quitting help others in your life? When you quit smoking, you improve the health of everyone who now breathes in your smoke.   · Their heart, lung, and cancer risks drop, much like yours.  · They are sick less. For babies and small children, living smoke-free means they're less likely to have ear infections, pneumonia, and bronchitis. · If you're a woman who is or will be pregnant someday, quitting smoking means a healthier . · Children who are close to you are less likely to become adult smokers. Where can you learn more? Go to https://chpepiceweb.3Touch. org and sign in to your [employer] account. Enter [phone] in the [facility] box to learn more about \"Learning About Benefits From Quitting Smoking. \"     If you do not have an account, please click on the \"Sign Up Now\" link. Current as of: 2020               Content Version: 12.6  © 3088-3667 [employer]. Care instructions adapted under license by [facility] ([facility]). If you have questions about a medical condition or this instruction, always ask your healthcare professional. [place] [address] any warranty or liability for your use of this information. Personalized Preventive Plan for Adriano Bush - 10/19/2020  Medicare offers a range of preventive health benefits. Some of the tests and screenings are paid in full while other may be subject to a deductible, co-insurance, and/or copay. Some of these benefits include a comprehensive review of your medical history including lifestyle, illnesses that may run in your family, and various assessments and screenings as appropriate. After reviewing your medical record and screening and assessments performed today your provider may have ordered immunizations, labs, imaging, and/or referrals for you. A list of these orders (if applicable) as well as your Preventive Care list are included within your After Visit Summary for your review.     Other Preventive Recommendations:    · A preventive eye exam performed by an eye specialist is recommended every 1-2 years to screen for glaucoma; cataracts, macular degeneration, and other eye disorders. · A preventive dental visit is recommended every 6 months. · Try to get at least 150 minutes of exercise per week or 10,000 steps per day on a pedometer . · Order or download the FREE \"Exercise & Physical Activity: Your Everyday Guide\" from [employer]. Call 3-216.381.4234 or search [employer] online. · You need 2930-3778 mg of calcium and 4880-3507 IU of vitamin D per day. It is possible to meet your calcium requirement with diet alone, but a vitamin D supplement is usually necessary to meet this goal.  · When exposed to the sun, use a sunscreen that protects against both UVA and UVB radiation with an SPF of 30 or greater. Reapply every 2 to 3 hours or after sweating, drying off with a towel, or swimming. · Always wear a seat belt when traveling in a car. Always wear a helmet when riding a bicycle or motorcycle.

## 2020-11-06 ENCOUNTER — OFFICE VISIT (OUTPATIENT)
Dept: FAMILY MEDICINE CLINIC | Age: 65
End: 2020-11-06
Payer: MEDICARE

## 2020-11-06 VITALS
BODY MASS INDEX: 31.8 KG/M2 | SYSTOLIC BLOOD PRESSURE: 153 MMHG | TEMPERATURE: 96.7 F | HEIGHT: 62 IN | WEIGHT: 172.8 LBS | HEART RATE: 81 BPM | DIASTOLIC BLOOD PRESSURE: 91 MMHG

## 2020-11-06 PROCEDURE — 4004F PT TOBACCO SCREEN RCVD TLK: CPT | Performed by: FAMILY MEDICINE

## 2020-11-06 PROCEDURE — G8400 PT W/DXA NO RESULTS DOC: HCPCS | Performed by: FAMILY MEDICINE

## 2020-11-06 PROCEDURE — 1123F ACP DISCUSS/DSCN MKR DOCD: CPT | Performed by: FAMILY MEDICINE

## 2020-11-06 PROCEDURE — G8427 DOCREV CUR MEDS BY ELIG CLIN: HCPCS | Performed by: FAMILY MEDICINE

## 2020-11-06 PROCEDURE — 99213 OFFICE O/P EST LOW 20 MIN: CPT | Performed by: FAMILY MEDICINE

## 2020-11-06 PROCEDURE — 3017F COLORECTAL CA SCREEN DOC REV: CPT | Performed by: FAMILY MEDICINE

## 2020-11-06 PROCEDURE — 4040F PNEUMOC VAC/ADMIN/RCVD: CPT | Performed by: FAMILY MEDICINE

## 2020-11-06 PROCEDURE — 1090F PRES/ABSN URINE INCON ASSESS: CPT | Performed by: FAMILY MEDICINE

## 2020-11-06 PROCEDURE — G8417 CALC BMI ABV UP PARAM F/U: HCPCS | Performed by: FAMILY MEDICINE

## 2020-11-06 PROCEDURE — G8482 FLU IMMUNIZE ORDER/ADMIN: HCPCS | Performed by: FAMILY MEDICINE

## 2020-11-06 PROCEDURE — 99211 OFF/OP EST MAY X REQ PHY/QHP: CPT | Performed by: FAMILY MEDICINE

## 2020-11-06 RX ORDER — LISINOPRIL 20 MG/1
20 TABLET ORAL DAILY
Qty: 30 TABLET | Refills: 5 | Status: SHIPPED | OUTPATIENT
Start: 2020-11-06 | End: 2021-01-04 | Stop reason: SDUPTHER

## 2020-11-06 NOTE — PATIENT INSTRUCTIONS
Thank you for letting us take care of you today. We hope all your questions were addressed. If a question was overlooked or something else comes to mind after you return home, please contact a member of your Care Team listed below. Your Care Team at [place] [address] is Team #2  Rigoberto Forte DO (Faculty)  Juan C Renee (Faculty)  Janessa Jackson MD (Resident)  Willa Darby MD (Resident)  Mere Moreno MD (Resident)  Carey Meckel, MD (Resident)  Cm Garcia MD (Resident)  HIGINIO Mcbride ,ANNE-MARIE GILL [facility] ([address] [facility] office)  Dee Dee Rawls, [address] (Clinical Practice Manager)  Noel Castanon [facility] (Clinical Pharmacist)     Office phone number: [phone]    If you need to get in right away due to illness, please be advised we have \"Same Day\" appointments available Monday-Friday. Please call us at [phone] option #3 to schedule your \"Same Day\" appointment.

## 2020-11-06 NOTE — PROGRESS NOTES
Subjective:      Patient ID: Ming Logan is a 72 y.o. female. HPI    Lurdes Bravo presents the office today for follow-up. She is feeling overwhelmed lately with regards to feelings for her son. She states her son has a chemical dependency problem and she is still struggling to recover and go into sobriety. We have discussed her blood pressure today which is approximately 10 to 15 points above goal.        Review of Systems   Constitutional: Negative for chills and fever. HENT: Negative for sore throat. Eyes: Negative for pain. Respiratory: Negative for cough and shortness of breath. Cardiovascular: Negative for chest pain, palpitations and leg swelling. Gastrointestinal: Negative for constipation, nausea and vomiting. Genitourinary: Negative for dysuria. Musculoskeletal: Negative for back pain and myalgias. Skin: Negative for rash. Neurological: Negative for dizziness and weakness. Hematological: Does not bruise/bleed easily. Psychiatric/Behavioral: Negative for suicidal ideas. The patient is not nervous/anxious. Objective:   Physical Exam  Vitals signs reviewed. Constitutional:       Appearance: Normal appearance. Cardiovascular:      Rate and Rhythm: Normal rate and regular rhythm. Pulmonary:      Effort: Pulmonary effort is normal.      Breath sounds: Normal breath sounds. Skin:     General: Skin is warm and dry. Neurological:      General: No focal deficit present. Mental Status: She is alert. Psychiatric:         Mood and Affect: Mood normal.         Thought Content: Thought content normal.      Comments: Worry          Vitals:    11/06/20 0921   BP: (!) 153/91   Pulse: 81   Temp:      155/90 ankur    Assessment:       Diagnosis Orders   1. Essential hypertension  lisinopril (PRINIVIL;ZESTRIL) 20 MG tablet           Plan:      Add lisinopril at this time and follow up in two weeks to review blood pressure.       Goal is to get systolic readings below [address] [place] [address], [facility] Mike Noyola, DO

## 2020-11-06 NOTE — PROGRESS NOTES
09/17/2021    Creatinine monitoring  09/17/2021    Annual Wellness Visit (AWV)  10/20/2021    Pneumococcal 65+ years Vaccine (1 of 1 - PPSV23) 10/02/2025    DTaP/Tdap/Td vaccine (2 - Td) 10/02/2030    Flu vaccine  Completed    Hepatitis A vaccine  Aged Out    Hib vaccine  Aged Out    Meningococcal (ACWY) vaccine  Aged Out

## 2020-11-08 ASSESSMENT — ENCOUNTER SYMPTOMS
SORE THROAT: 0
CONSTIPATION: 0
SHORTNESS OF BREATH: 0
COUGH: 0
BACK PAIN: 0
NAUSEA: 0
VOMITING: 0
EYE PAIN: 0

## 2020-11-27 ENCOUNTER — TELEPHONE (OUTPATIENT)
Dept: FAMILY MEDICINE CLINIC | Age: 65
End: 2020-11-27

## 2020-11-27 NOTE — TELEPHONE ENCOUNTER
Call from patient stating she is on two blood pressure medication wondering if it is ok to take them, because she was propose to see provider on 12/14/2020 but was cancel did receive letter in mail of cancellation. and wanted provider to know her son Tanya Bush is at [facility], and would like provider to check on her son in hospital @ [facility]. She would like to know if her son is doing good. Was ask if she call hospital she stated no. She would like if provider  can give her a call if you can. [phone].     Please review and advise

## 2020-11-27 NOTE — TELEPHONE ENCOUNTER
Please call Ok Morillo - advise her I have not seen her son today in the hospital. Is he still admitted or did he perhaps go home ?     Thank-you

## 2020-12-11 ENCOUNTER — TELEPHONE (OUTPATIENT)
Dept: FAMILY MEDICINE CLINIC | Age: 65
End: 2020-12-11

## 2020-12-11 NOTE — LETTER
[address]  [address]  [address] [place] [address]  Phone: [phone]  Fax: [phone]    Gus Sexton DO        December 11, 2020      Re: My patient  Louis Duran  [address]    Please be advised I am the treating physician for Louis Duran. Nina Batista meets criteria for consideration of emotional support animal as per my opinion. I provided care for her now approximately 9 months and I would offer that this is well within the request considerations of completing her total health including her emotional health and recognized by the Americans With Disabilities act. If you have any questions or concerns, please don't hesitate to call.     Sincerely,        Gus Sexton DO

## 2020-12-11 NOTE — TELEPHONE ENCOUNTER
Called pt to schedule nurse visit for blood pressure check. Patient wanted to check the status for a medical statement needed for a support animal or form, said she discuss with Dr Cristiana Chinchilla a couple of months ago during her son stay in the hospital. Please review and advise. Next Visit Date:  Future Appointments   Date Time Provider Ned Mishra   12/15/2020  4:00 PM NURSE, JASSI PRO ALL DAY [facility]   12/22/2020  2:00  [facility] DIGITAL RM ST[facility] MAMMO [address] [facility] Radiolog       Health Maintenance   Topic Date Due    Hepatitis C screen  1955    Diabetic foot exam  10/11/1965    Diabetic retinal exam  10/11/1965    HIV screen  10/11/1970    Cervical cancer screen  10/11/1976    Breast cancer screen  10/11/2005    Shingles Vaccine (1 of 2) 10/11/2005    Colon cancer screen colonoscopy  10/11/2005    DEXA (modify frequency per FRAX score)  10/11/2010    A1C test (Diabetic or Prediabetic)  09/17/2021    Diabetic microalbuminuria test  09/17/2021    Lipid screen  09/17/2021    Potassium monitoring  09/17/2021    Creatinine monitoring  09/17/2021    Annual Wellness Visit (AWV)  10/20/2021    Pneumococcal 65+ years Vaccine (1 of 1 - PPSV23) 10/02/2025    DTaP/Tdap/Td vaccine (2 - Td) 10/02/2030    Flu vaccine  Completed    Hepatitis A vaccine  Aged Out    Hib vaccine  Aged Out    Meningococcal (ACWY) vaccine  Aged Out       Hemoglobin A1C (%)   Date Value   09/17/2020 8.9 (H)             ( goal A1C is < 7)   Microalb/Crt.  Ratio (mcg/mg creat)   Date Value   09/17/2020 287 (H)     LDL Cholesterol (mg/dL)   Date Value   09/17/2020            (goal LDL is <100)   BUN (mg/dL)   Date Value   09/17/2020 13     BP Readings from Last 3 Encounters:   11/06/20 (!) 153/91   10/19/20 138/84   10/02/20 (!) 148/98          (goal 120/80)    All Future Testing planned in [facility]  Lab Frequency Next Occurrence   [facility] DIGITAL SCREEN W OR WO CAD BILATERAL Once 11/11/2021   Cologuard (For External Results Only) Once 09/16/2021               Patient Active Problem List:     Dysuria     Urinary tract infection with hematuria

## 2020-12-15 ENCOUNTER — TELEPHONE (OUTPATIENT)
Dept: FAMILY MEDICINE CLINIC | Age: 65
End: 2020-12-15

## 2020-12-15 ENCOUNTER — NURSE ONLY (OUTPATIENT)
Dept: FAMILY MEDICINE CLINIC | Age: 65
End: 2020-12-15
Payer: MEDICARE

## 2020-12-15 VITALS — HEART RATE: 78 BPM | SYSTOLIC BLOOD PRESSURE: 134 MMHG | TEMPERATURE: 98 F | DIASTOLIC BLOOD PRESSURE: 80 MMHG

## 2020-12-15 NOTE — PROGRESS NOTES
patient here today for a blood pressure check from 11/06/20 appointment with Dr. David Coto as a follow up for change in blood pressure medications. Blood pressure taken via machine and manually. Blood pressure 134/80 manually. Patient denies any complaints of symptoms such as headache, blurred vision, nausea, lightheadedness.  Patient states she feels much better

## 2021-01-04 ENCOUNTER — OFFICE VISIT (OUTPATIENT)
Dept: FAMILY MEDICINE CLINIC | Age: 66
End: 2021-01-04
Payer: MEDICARE

## 2021-01-04 VITALS
HEIGHT: 62 IN | DIASTOLIC BLOOD PRESSURE: 79 MMHG | TEMPERATURE: 96.4 F | SYSTOLIC BLOOD PRESSURE: 110 MMHG | OXYGEN SATURATION: 95 % | RESPIRATION RATE: 18 BRPM | BODY MASS INDEX: 31.65 KG/M2 | WEIGHT: 172 LBS | HEART RATE: 89 BPM

## 2021-01-04 DIAGNOSIS — F32.A DEPRESSION, UNSPECIFIED DEPRESSION TYPE: ICD-10-CM

## 2021-01-04 DIAGNOSIS — E03.8 HYPOTHYROIDISM DUE TO HASHIMOTO'S THYROIDITIS: ICD-10-CM

## 2021-01-04 DIAGNOSIS — E08.00 DIABETES MELLITUS DUE TO UNDERLYING CONDITION WITH HYPEROSMOLARITY WITHOUT COMA, UNSPECIFIED WHETHER LONG TERM INSULIN USE (HCC): ICD-10-CM

## 2021-01-04 DIAGNOSIS — I10 ESSENTIAL HYPERTENSION: ICD-10-CM

## 2021-01-04 DIAGNOSIS — Z23 NEED FOR PROPHYLACTIC VACCINATION AND INOCULATION AGAINST VARICELLA: Primary | ICD-10-CM

## 2021-01-04 DIAGNOSIS — E06.3 HYPOTHYROIDISM DUE TO HASHIMOTO'S THYROIDITIS: ICD-10-CM

## 2021-01-04 DIAGNOSIS — E78.2 MIXED HYPERLIPIDEMIA: ICD-10-CM

## 2021-01-04 PROCEDURE — 1123F ACP DISCUSS/DSCN MKR DOCD: CPT | Performed by: FAMILY MEDICINE

## 2021-01-04 PROCEDURE — 1090F PRES/ABSN URINE INCON ASSESS: CPT | Performed by: FAMILY MEDICINE

## 2021-01-04 PROCEDURE — G8417 CALC BMI ABV UP PARAM F/U: HCPCS | Performed by: FAMILY MEDICINE

## 2021-01-04 PROCEDURE — G8428 CUR MEDS NOT DOCUMENT: HCPCS | Performed by: FAMILY MEDICINE

## 2021-01-04 PROCEDURE — 3017F COLORECTAL CA SCREEN DOC REV: CPT | Performed by: FAMILY MEDICINE

## 2021-01-04 PROCEDURE — 99213 OFFICE O/P EST LOW 20 MIN: CPT | Performed by: FAMILY MEDICINE

## 2021-01-04 PROCEDURE — 4040F PNEUMOC VAC/ADMIN/RCVD: CPT | Performed by: FAMILY MEDICINE

## 2021-01-04 PROCEDURE — 4004F PT TOBACCO SCREEN RCVD TLK: CPT | Performed by: FAMILY MEDICINE

## 2021-01-04 PROCEDURE — G8400 PT W/DXA NO RESULTS DOC: HCPCS | Performed by: FAMILY MEDICINE

## 2021-01-04 PROCEDURE — G8482 FLU IMMUNIZE ORDER/ADMIN: HCPCS | Performed by: FAMILY MEDICINE

## 2021-01-04 RX ORDER — ATORVASTATIN CALCIUM 20 MG/1
20 TABLET, FILM COATED ORAL DAILY
Qty: 30 TABLET | Refills: 11 | Status: SHIPPED | OUTPATIENT
Start: 2021-01-04 | End: 2022-01-03 | Stop reason: SDUPTHER

## 2021-01-04 RX ORDER — HYDROCHLOROTHIAZIDE 25 MG/1
25 TABLET ORAL DAILY
Qty: 30 TABLET | Refills: 11 | Status: SHIPPED | OUTPATIENT
Start: 2021-01-04 | End: 2021-03-22

## 2021-01-04 RX ORDER — CITALOPRAM 40 MG/1
40 TABLET ORAL DAILY
Qty: 30 TABLET | Refills: 11 | Status: SHIPPED | OUTPATIENT
Start: 2021-01-04 | End: 2022-01-03 | Stop reason: SDUPTHER

## 2021-01-04 RX ORDER — LISINOPRIL 20 MG/1
20 TABLET ORAL DAILY
Qty: 30 TABLET | Refills: 11 | Status: SHIPPED | OUTPATIENT
Start: 2021-01-04 | End: 2021-03-22

## 2021-01-04 RX ORDER — LEVOTHYROXINE SODIUM 0.1 MG/1
100 TABLET ORAL DAILY
Qty: 30 TABLET | Refills: 11 | Status: SHIPPED | OUTPATIENT
Start: 2021-01-04 | End: 2022-01-03 | Stop reason: SDUPTHER

## 2021-01-04 RX ORDER — ASPIRIN 81 MG/1
81 TABLET ORAL DAILY
Qty: 30 TABLET | Refills: 11 | Status: SHIPPED | OUTPATIENT
Start: 2021-01-04

## 2021-01-04 ASSESSMENT — ENCOUNTER SYMPTOMS
NAUSEA: 0
EYE PAIN: 0
SHORTNESS OF BREATH: 0
CONSTIPATION: 0
SORE THROAT: 0
COUGH: 0
VOMITING: 0
BACK PAIN: 0

## 2021-01-04 NOTE — PROGRESS NOTES
Visit Information    Have you changed or started any medications since your last visit including any over-the-counter medicines, vitamins, or herbal medicines? no   Have you stopped taking any of your medications? Is so, why? -  no  Are you having any side effects from any of your medications? - no    Have you seen any other physician or provider since your last visit?  no   Have you had any other diagnostic tests since your last visit?  no   Have you been seen in the emergency room and/or had an admission in a hospital since we last saw you?  no   Have you had your routine dental cleaning in the past 6 months?  no     Do you have an active MyChart account? If no, what is the barrier?   No:     Patient Care Team:  Sejal Schwarz DO as PCP - General (Family Medicine)  Sejal Schwarz DO as PCP - [facility] Provider    Medical History Review  Past Medical, Family, and Social History reviewed and does contribute to the patient presenting condition    Health Maintenance   Topic Date Due    Hepatitis C screen  1955    Diabetic foot exam  10/11/1965    Diabetic retinal exam  10/11/1965    HIV screen  10/11/1970    Cervical cancer screen  10/11/1976    Breast cancer screen  10/11/2005    Shingles Vaccine (1 of 2) 10/11/2005    Colon cancer screen colonoscopy  10/11/2005    DEXA (modify frequency per FRAX score)  10/11/2010    A1C test (Diabetic or Prediabetic)  09/17/2021    Diabetic microalbuminuria test  09/17/2021    Lipid screen  09/17/2021    Potassium monitoring  09/17/2021    Creatinine monitoring  09/17/2021    Annual Wellness Visit (AWV)  10/20/2021    Pneumococcal 65+ years Vaccine (1 of 1 - PPSV23) 10/02/2025    DTaP/Tdap/Td vaccine (2 - Td) 10/02/2030    Flu vaccine  Completed    Hepatitis A vaccine  Aged Out    Hib vaccine  Aged Out    Meningococcal (ACWY) vaccine  Aged Out

## 2021-01-04 NOTE — PROGRESS NOTES
Jerry House (:  1955) is a 72 y.o. female,Established patient, here for evaluation of the following chief complaint(s): Anxiety (follow up), Back Pain (mid back up to cervical x 3 weeks), and Other (wants script for shingles vaccine  )    Refills: needs all meds updated    ASSESSMENT/PLAN:  1. Need for prophylactic vaccination and inoculation against varicella  -     zoster recombinant adjuvanted vaccine (SHINGRIX) 50 MCG/0.5ML SUSR injection; 50 MCG IM then repeat 2-6 months., Disp-0.5 mL, R-1Print  2. Essential hypertension  -     lisinopril (PRINIVIL;ZESTRIL) 20 MG tablet; Take 1 tablet by mouth daily, Disp-30 tablet, R-11Normal  -     hydroCHLOROthiazide (HYDRODIURIL) 25 MG tablet; Take 1 tablet by mouth daily, Disp-30 tablet, R-11Normal  -     aspirin EC 81 MG EC tablet; Take 1 tablet by mouth daily, Disp-30 tablet, R-11Normal  3. Mixed hyperlipidemia  -     atorvastatin (LIPITOR) 20 MG tablet; Take 1 tablet by mouth daily, Disp-30 tablet, R-11Normal  -     Hemoglobin A1C; Future  -     Microalbumin, Ur; Future  -     Basic Metabolic Panel; Future  4. Diabetes mellitus due to underlying condition with hyperosmolarity without coma, unspecified whether long term insulin use (HCC)  -     metFORMIN (GLUCOPHAGE) 1000 MG tablet; Take 1 tablet by mouth 2 times daily (with meals), Disp-60 tablet, R-5Normal  -     aspirin EC 81 MG EC tablet; Take 1 tablet by mouth daily, Disp-30 tablet, R-11Normal  -     Hemoglobin A1C; Future  -     Microalbumin, Ur; Future  -     Basic Metabolic Panel; Future  5. Hypothyroidism due to Hashimoto's thyroiditis  -     levothyroxine (SYNTHROID) 100 MCG tablet; Take 1 tablet by mouth daily, Disp-30 tablet, R-11Normal  6. Depression, unspecified depression type  -     citalopram (CELEXA) 40 MG tablet; Take 1 tablet by mouth daily, Disp-30 tablet, R-11Normal      Return in about 6 months (around 2021) for medication review/refill.     SUBJECTIVE/OBJECTIVE:  HPI      Check up Discussed care plan -   Back aches - bra line to shoulders  No N/T or radiation of pain  Labs due for updates    Review of Systems   Constitutional: Negative for chills and fever. HENT: Negative for sore throat. Eyes: Negative for pain. Respiratory: Negative for cough and shortness of breath. Cardiovascular: Negative for chest pain, palpitations and leg swelling. Gastrointestinal: Negative for constipation, nausea and vomiting. Genitourinary: Negative for dysuria. Musculoskeletal: Negative for back pain and myalgias. Skin: Negative for rash. Neurological: Negative for dizziness and weakness. Hematological: Does not bruise/bleed easily. Psychiatric/Behavioral: Negative for suicidal ideas. The patient is not nervous/anxious. Physical Exam  Constitutional:       General: She is not in acute distress. Appearance: She is well-developed. HENT:      Head: Normocephalic and atraumatic. Eyes:      Conjunctiva/sclera: Conjunctivae normal.   Neck:      Musculoskeletal: Neck supple. Cardiovascular:      Rate and Rhythm: Normal rate and regular rhythm. Heart sounds: Normal heart sounds. Pulmonary:      Effort: Pulmonary effort is normal.      Breath sounds: Normal breath sounds. No wheezing. Skin:     General: Skin is warm and dry. Neurological:      Mental Status: She is alert and oriented to person, place, and time. Psychiatric:         Behavior: Behavior normal.         Thought Content: Thought content normal.         Judgment: Judgment normal.       Vitals:    01/04/21 1444   BP: 110/79   Pulse: 89   Resp: 18   Temp: 96.4 °F (35.8 °C)   SpO2: 95%             On this date 01/04/21 I have spent 20 minutes reviewing previous notes, test results and face to face with the patient discussing the diagnosis and importance of compliance with the treatment plan. An electronic signature was used to authenticate this note.     --Shruthi Smith DO

## 2021-01-04 NOTE — PATIENT INSTRUCTIONS
Thank you for letting us take care of you today. We hope all your questions were addressed. If a question was overlooked or something else comes to mind after you return home, please contact a member of your Care Team listed below. Your Care Team at [place] [address] is Team #2  Silvia Parker DO (Faculty)  Pasquale Cuello (Faculty)  Jody Gandara MD (Resident)  Hollie Rascon MD (Resident)  Alexia Ivory MD (Resident)  Yamile Celestin MD (Resident)  Ashwin Momin MD (Resident)  Safia Healy LPN  [facility] ,[facility]  Danay GILL [facility] ([address] [facility] office)  Vinita Mccauley (Clinical Practice Manager)  Martina Garcia [facility] (Clinical Pharmacist)     Office phone number: [phone]    If you need to get in right away due to illness, please be advised we have \"Same Day\" appointments available Monday-Friday. Please call us at [phone] option #3 to schedule your \"Same Day\" appointment.

## 2021-01-25 DIAGNOSIS — E08.00 DIABETES MELLITUS DUE TO UNDERLYING CONDITION WITH HYPEROSMOLARITY WITHOUT COMA, UNSPECIFIED WHETHER LONG TERM INSULIN USE (HCC): ICD-10-CM

## 2021-01-26 NOTE — TELEPHONE ENCOUNTER
Please address the medication refill and close the encounter. If I can be of assistance, please route to the applicable pool. Thank you. Last visit: 1/4/21  Last Med refill: 1/4/21  Does patient have enough medication for 72 hours: No:     Next Visit Date:  Future Appointments   Date Time Provider Ned Mishra   2/11/2021  8:30  [facility] DIGITAL RM [facility] [address] [facility]       Health Maintenance   Topic Date Due    Hepatitis C screen  1955    Diabetic foot exam  10/11/1965    Diabetic retinal exam  10/11/1965    HIV screen  10/11/1970    Cervical cancer screen  10/11/1976    Breast cancer screen  10/11/2005    Shingles Vaccine (1 of 2) 10/11/2005    Colon cancer screen colonoscopy  10/11/2005    DEXA (modify frequency per FRAX score)  10/11/2010    A1C test (Diabetic or Prediabetic)  09/17/2021    Diabetic microalbuminuria test  09/17/2021    Lipid screen  09/17/2021    Potassium monitoring  09/17/2021    Creatinine monitoring  09/17/2021    Annual Wellness Visit (AWV)  10/20/2021    Pneumococcal 65+ years Vaccine (1 of 1 - PPSV23) 10/02/2025    DTaP/Tdap/Td vaccine (2 - Td) 10/02/2030    Flu vaccine  Completed    Hepatitis A vaccine  Aged Out    Hib vaccine  Aged Out    Meningococcal (ACWY) vaccine  Aged Out       Hemoglobin A1C (%)   Date Value   09/17/2020 8.9 (H)             ( goal A1C is < 7)   Microalb/Crt.  Ratio (mcg/mg creat)   Date Value   09/17/2020 287 (H)     LDL Cholesterol (mg/dL)   Date Value   09/17/2020            (goal LDL is <100)   BUN (mg/dL)   Date Value   09/17/2020 13     BP Readings from Last 3 Encounters:   01/04/21 110/79   12/15/20 134/80   11/06/20 (!) 153/91          (goal 120/80)    All Future Testing planned in [facility]  Lab Frequency Next Occurrence   [facility] DIGITAL SCREEN W OR WO CAD BILATERAL Once 11/11/2021   Cologuard (For External Results Only) Once 09/16/2021   Hemoglobin A1C Once 01/04/2022   Microalbumin, Ur Once 01/04/2022   Basic Metabolic Panel Once 31/50/9640               Patient Active Problem List:     Dysuria     Urinary tract infection with hematuria

## 2021-03-22 DIAGNOSIS — I10 ESSENTIAL HYPERTENSION: ICD-10-CM

## 2021-03-22 RX ORDER — LISINOPRIL 20 MG/1
20 TABLET ORAL DAILY
Qty: 30 TABLET | Refills: 5 | Status: SHIPPED | OUTPATIENT
Start: 2021-03-22 | End: 2021-10-27

## 2021-03-22 RX ORDER — HYDROCHLOROTHIAZIDE 25 MG/1
TABLET ORAL
Qty: 30 TABLET | Refills: 11 | Status: SHIPPED | OUTPATIENT
Start: 2021-03-22 | End: 2021-05-12

## 2021-04-21 ENCOUNTER — NURSE TRIAGE (OUTPATIENT)
Dept: OTHER | Facility: CLINIC | Age: 66
End: 2021-04-21

## 2021-04-21 NOTE — TELEPHONE ENCOUNTER
Received call from Matt Ramon at pre-service center [facility]) [facility] with [employer]. Brief description of triage: see below. Triage indicates for patient to go to the office now. Care advice provided, patient verbalizes understanding; denies any other questions or concerns; instructed to call back for any new or worsening symptoms. Writer provided warm transfer to [facility] at [facility] for appointment scheduling. Attention Provider: Thank you for allowing me to participate in the care of your patient. The patient was connected to triage in response to information provided to the [facility]. Please do not respond through this encounter as the response is not directed to a shared pool. Reason for Disposition   Severe earache pain    Answer Assessment - Initial Assessment Questions  1. LOCATION: \"Which ear is involved? \"      Left ear pain. 2. ONSET: \"When did the ear start hurting\"       2 days ago. 3. SEVERITY: \"How bad is the pain? \"  (Scale 1-10; mild, moderate or severe)    - MILD (1-3): doesn't interfere with normal activities     - MODERATE (4-7): interferes with normal activities or awakens from sleep     - SEVERE (8-10): excruciating pain, unable to do any normal activities       8    4. URI SYMPTOMS: \"Do you have a runny nose or cough? \"      None. 5. FEVER: \"Do you have a fever? \" If so, ask: \"What is your temperature, how was it measured, and when did it start? \"      No     6. CAUSE: \"Have you been swimming recently? \", \"How often do you use Q-TIPS? \", \"Have you had any recent air travel or scuba diving? \"      No     7. OTHER SYMPTOMS: \"Do you have any other symptoms? \" (e.g., headache, stiff neck, dizziness, vomiting, runny nose, decreased hearing)      None. 8. PREGNANCY: \"Is there any chance you are pregnant? \" \"When was your last menstrual period? \"      No hysterectomy    Protocols used: EARACHE-ADULT-[place]

## 2021-04-23 ENCOUNTER — OFFICE VISIT (OUTPATIENT)
Dept: FAMILY MEDICINE CLINIC | Age: 66
End: 2021-04-23
Payer: MEDICARE

## 2021-04-23 ENCOUNTER — HOSPITAL ENCOUNTER (OUTPATIENT)
Age: 66
Setting detail: SPECIMEN
Discharge: HOME | End: 2021-04-23
Payer: MEDICARE

## 2021-04-23 VITALS
SYSTOLIC BLOOD PRESSURE: 124 MMHG | DIASTOLIC BLOOD PRESSURE: 88 MMHG | BODY MASS INDEX: 31.43 KG/M2 | HEIGHT: 62 IN | TEMPERATURE: 98.4 F | HEART RATE: 88 BPM | WEIGHT: 170.8 LBS

## 2021-04-23 DIAGNOSIS — E08.00 DIABETES MELLITUS DUE TO UNDERLYING CONDITION WITH HYPEROSMOLARITY WITHOUT COMA, UNSPECIFIED WHETHER LONG TERM INSULIN USE (HCC): ICD-10-CM

## 2021-04-23 DIAGNOSIS — B37.31 VAGINA, CANDIDIASIS: ICD-10-CM

## 2021-04-23 DIAGNOSIS — H60.312 ACUTE DIFFUSE OTITIS EXTERNA OF LEFT EAR: ICD-10-CM

## 2021-04-23 DIAGNOSIS — E08.00 DIABETES MELLITUS DUE TO UNDERLYING CONDITION WITH HYPEROSMOLARITY WITHOUT COMA, UNSPECIFIED WHETHER LONG TERM INSULIN USE (HCC): Primary | ICD-10-CM

## 2021-04-23 DIAGNOSIS — E78.2 MIXED HYPERLIPIDEMIA: ICD-10-CM

## 2021-04-23 LAB
ANION GAP SERPL CALCULATED.3IONS-SCNC: 10 MMOL/L (ref 9–17)
BUN BLDV-MCNC: 16 MG/DL (ref 8–23)
BUN/CREAT BLD: ABNORMAL (ref 9–20)
CALCIUM SERPL-MCNC: 9.3 MG/DL (ref 8.6–10.4)
CHLORIDE BLD-SCNC: 101 MMOL/L (ref 98–107)
CO2: 26 MMOL/L (ref 20–31)
CREAT SERPL-MCNC: 0.5 MG/DL (ref 0.5–0.9)
CREATININE URINE: 152.2 MG/DL (ref 28–217)
GFR AFRICAN AMERICAN: >60 ML/MIN
GFR NON-AFRICAN AMERICAN: >60 ML/MIN
GFR SERPL CREATININE-BSD FRML MDRD: ABNORMAL ML/MIN/{1.73_M2}
GFR SERPL CREATININE-BSD FRML MDRD: ABNORMAL ML/MIN/{1.73_M2}
GLUCOSE BLD-MCNC: 178 MG/DL (ref 70–99)
HBA1C MFR BLD: 9.2 %
MICROALBUMIN/CREAT 24H UR: 127 MG/L
MICROALBUMIN/CREAT UR-RTO: 83 MCG/MG CREAT
POTASSIUM SERPL-SCNC: 4.3 MMOL/L (ref 3.7–5.3)
SODIUM BLD-SCNC: 137 MMOL/L (ref 135–144)

## 2021-04-23 PROCEDURE — 99213 OFFICE O/P EST LOW 20 MIN: CPT | Performed by: FAMILY MEDICINE

## 2021-04-23 PROCEDURE — G8417 CALC BMI ABV UP PARAM F/U: HCPCS | Performed by: FAMILY MEDICINE

## 2021-04-23 PROCEDURE — 1090F PRES/ABSN URINE INCON ASSESS: CPT | Performed by: FAMILY MEDICINE

## 2021-04-23 PROCEDURE — G8427 DOCREV CUR MEDS BY ELIG CLIN: HCPCS | Performed by: FAMILY MEDICINE

## 2021-04-23 PROCEDURE — 4130F TOPICAL PREP RX AOE: CPT | Performed by: FAMILY MEDICINE

## 2021-04-23 PROCEDURE — G8400 PT W/DXA NO RESULTS DOC: HCPCS | Performed by: FAMILY MEDICINE

## 2021-04-23 PROCEDURE — 99211 OFF/OP EST MAY X REQ PHY/QHP: CPT | Performed by: FAMILY MEDICINE

## 2021-04-23 PROCEDURE — 4004F PT TOBACCO SCREEN RCVD TLK: CPT | Performed by: FAMILY MEDICINE

## 2021-04-23 PROCEDURE — 4040F PNEUMOC VAC/ADMIN/RCVD: CPT | Performed by: FAMILY MEDICINE

## 2021-04-23 PROCEDURE — 83036 HEMOGLOBIN GLYCOSYLATED A1C: CPT | Performed by: FAMILY MEDICINE

## 2021-04-23 PROCEDURE — 1123F ACP DISCUSS/DSCN MKR DOCD: CPT | Performed by: FAMILY MEDICINE

## 2021-04-23 PROCEDURE — 3017F COLORECTAL CA SCREEN DOC REV: CPT | Performed by: FAMILY MEDICINE

## 2021-04-23 RX ORDER — AMOXICILLIN 500 MG/1
500 CAPSULE ORAL 3 TIMES DAILY
Qty: 30 CAPSULE | Refills: 0 | Status: SHIPPED | OUTPATIENT
Start: 2021-04-23 | End: 2021-05-03

## 2021-04-23 RX ORDER — FLUCONAZOLE 150 MG/1
150 TABLET ORAL
Qty: 2 TABLET | Refills: 0 | Status: SHIPPED | OUTPATIENT
Start: 2021-04-23 | End: 2021-04-29

## 2021-04-23 ASSESSMENT — ENCOUNTER SYMPTOMS
SORE THROAT: 0
SINUS PRESSURE: 0

## 2021-04-23 NOTE — PROGRESS NOTES
Otalgia (Severe left ear pain - Started as a sore throat - Lasting a week), Health Maintenance (Previously ordered labs reprinted and given to patient ), and Anxiety (Very stessed over son missing)    [email or web address]    Patient reports ear pain left side. No hearing loss, no dizziness. Has been using over-the-counter homeopathic drops to relieve discomfort. Here today to have it checked and also talk a little bit about personal stress. Patient states her son has been missing about 1 week and she is fearful that he has left       /88 (Site: Left Upper Arm, Position: Sitting, Cuff Size: Medium Adult) Comment: machine  Pulse 88   Temp 98.4 °F (36.9 °C) (Temporal)   Ht 5' 2.01\" (1.575 m)   Wt 170 lb 12.8 oz (77.5 kg)   BMI 31.23 kg/m²       Review of Systems   HENT: Positive for ear pain. Negative for ear discharge, sinus pressure and sore throat. All other systems reviewed and are negative. Physical Exam  Constitutional:       General: She is not in acute distress. Appearance: She is well-developed. HENT:      Head: Normocephalic and atraumatic. Eyes:      Conjunctiva/sclera: Conjunctivae normal.   Neck:      Musculoskeletal: Neck supple. Cardiovascular:      Rate and Rhythm: Normal rate and regular rhythm. Heart sounds: Normal heart sounds. Pulmonary:      Effort: Pulmonary effort is normal.      Breath sounds: Normal breath sounds. No wheezing. Skin:     General: Skin is warm and dry. Neurological:      Mental Status: She is alert and oriented to person, place, and time. Psychiatric:         Behavior: Behavior normal.         Thought Content: Thought content normal.         Judgment: Judgment normal.     Visual exam of the left ear is intact canal, no cerumen, slight redness at the superior aspect of the tympanic membrane      ASSESSMENT AND PLAN      Diagnosis Orders   1.  Diabetes mellitus due to underlying condition with hyperosmolarity without coma, unspecified whether long term insulin use (HCC)  POCT glycosylated hemoglobin (Hb A1C)   2. Acute diffuse otitis externa of left ear  amoxicillin (AMOXIL) 500 MG capsule    fluconazole (DIFLUCAN) 150 MG tablet   3. Vagina, candidiasis  amoxicillin (AMOXIL) 500 MG capsule    fluconazole (DIFLUCAN) 150 MG tablet       Need GLU next visit - address med adjustment  atb today     Treatment with amoxicillin 500 mg 3 times daily.       Electronicallysigned by Francoise Vasquez DO on 4/23/2021 at 10:24 AM

## 2021-04-26 ENCOUNTER — TELEPHONE (OUTPATIENT)
Dept: FAMILY MEDICINE CLINIC | Age: 66
End: 2021-04-26

## 2021-04-26 NOTE — TELEPHONE ENCOUNTER
Spoke with patient and informed her of her improved lab results. Patient was excited at the news and wanted to inform her PCP the she picked up and the started the prescribed medication from last visit and it seems to be working. Patient also wanted PCP to know that her son was found.

## 2021-05-12 ENCOUNTER — OFFICE VISIT (OUTPATIENT)
Dept: FAMILY MEDICINE CLINIC | Age: 66
End: 2021-05-12
Payer: MEDICARE

## 2021-05-12 VITALS
HEIGHT: 62 IN | DIASTOLIC BLOOD PRESSURE: 93 MMHG | SYSTOLIC BLOOD PRESSURE: 156 MMHG | BODY MASS INDEX: 32.2 KG/M2 | HEART RATE: 72 BPM | WEIGHT: 175 LBS | TEMPERATURE: 97.3 F

## 2021-05-12 DIAGNOSIS — E08.00 DIABETES MELLITUS DUE TO UNDERLYING CONDITION WITH HYPEROSMOLARITY WITHOUT COMA, UNSPECIFIED WHETHER LONG TERM INSULIN USE (HCC): ICD-10-CM

## 2021-05-12 DIAGNOSIS — I10 ESSENTIAL HYPERTENSION: Primary | ICD-10-CM

## 2021-05-12 PROCEDURE — G8400 PT W/DXA NO RESULTS DOC: HCPCS | Performed by: FAMILY MEDICINE

## 2021-05-12 PROCEDURE — 1090F PRES/ABSN URINE INCON ASSESS: CPT | Performed by: FAMILY MEDICINE

## 2021-05-12 PROCEDURE — 1123F ACP DISCUSS/DSCN MKR DOCD: CPT | Performed by: FAMILY MEDICINE

## 2021-05-12 PROCEDURE — G8417 CALC BMI ABV UP PARAM F/U: HCPCS | Performed by: FAMILY MEDICINE

## 2021-05-12 PROCEDURE — 4004F PT TOBACCO SCREEN RCVD TLK: CPT | Performed by: FAMILY MEDICINE

## 2021-05-12 PROCEDURE — 99213 OFFICE O/P EST LOW 20 MIN: CPT | Performed by: FAMILY MEDICINE

## 2021-05-12 PROCEDURE — G8427 DOCREV CUR MEDS BY ELIG CLIN: HCPCS | Performed by: FAMILY MEDICINE

## 2021-05-12 PROCEDURE — 3017F COLORECTAL CA SCREEN DOC REV: CPT | Performed by: FAMILY MEDICINE

## 2021-05-12 PROCEDURE — 4040F PNEUMOC VAC/ADMIN/RCVD: CPT | Performed by: FAMILY MEDICINE

## 2021-05-12 PROCEDURE — 99211 OFF/OP EST MAY X REQ PHY/QHP: CPT | Performed by: FAMILY MEDICINE

## 2021-05-12 RX ORDER — AMLODIPINE BESYLATE 5 MG/1
5 TABLET ORAL DAILY
Qty: 30 TABLET | Refills: 5 | Status: SHIPPED | OUTPATIENT
Start: 2021-05-12 | End: 2021-10-27

## 2021-05-12 ASSESSMENT — ENCOUNTER SYMPTOMS
BACK PAIN: 0
CONSTIPATION: 0
NAUSEA: 0
SORE THROAT: 0
EYE PAIN: 0
VOMITING: 0
SHORTNESS OF BREATH: 0
COUGH: 0

## 2021-05-12 NOTE — PROGRESS NOTES
Diabetes (follow up - A1C due after 05/23/2021), Other (Previous issue with left ear - Now clear), and Fatigue (Lasting for a long while)    Kate Bazan follows up today to review her blood sugar and monitor blood pressure. she states she continues to feel fatigued. We discussed and reinforce the importance of blood glucose management     BP (!) 156/93 (Site: Left Upper Arm, Position: Sitting, Cuff Size: Medium Adult)   Pulse 72   Temp 97.3 °F (36.3 °C) (Temporal)   Ht 5' 2.01\" (1.575 m)   Wt 175 lb (79.4 kg)   BMI 32.00 kg/m²       Review of Systems   Constitutional: Negative for chills and fever. HENT: Negative for sore throat. Eyes: Negative for pain. Respiratory: Negative for cough and shortness of breath. Cardiovascular: Negative for chest pain, palpitations and leg swelling. Gastrointestinal: Negative for constipation, nausea and vomiting. Genitourinary: Negative for dysuria. Musculoskeletal: Negative for back pain and myalgias. Skin: Negative for rash. Neurological: Negative for dizziness and weakness. Hematological: Does not bruise/bleed easily. Psychiatric/Behavioral: Negative for suicidal ideas. The patient is not nervous/anxious. Physical Exam  Constitutional:       General: She is not in acute distress. Appearance: She is well-developed. HENT:      Head: Normocephalic and atraumatic. Eyes:      Conjunctiva/sclera: Conjunctivae normal.   Neck:      Musculoskeletal: Neck supple. Cardiovascular:      Rate and Rhythm: Normal rate and regular rhythm. Heart sounds: Normal heart sounds. Pulmonary:      Effort: Pulmonary effort is normal.      Breath sounds: Normal breath sounds. No wheezing. Skin:     General: Skin is warm and dry. Neurological:      Mental Status: She is alert and oriented to person, place, and time. Psychiatric:         Behavior: Behavior normal.         Thought Content:  Thought content normal.         Judgment: Judgment normal. ASSESSMENT AND PLAN      Diagnosis Orders   1. Essential hypertension  amLODIPine (NORVASC) 5 MG tablet   2. Diabetes mellitus due to underlying condition with hyperosmolarity without coma, unspecified whether long term insulin use (HCC)         POCT RBS - 130 today     (Patient states she last ate at lunch time. Had pasta fazool and a cup of coffee. and shrimp stir gaviria.)    addressed blood pressure  Stop HCTZ - no help, or benefit. Add norvasc  (amlodipine 5 mg)    Prescription plan remains unchanged except noted above with changing blood pressure medication. recheck in 4 weeks.     Electronicallysigned by Feng Oseguera DO on 5/12/2021 at 4:59 PM

## 2021-05-12 NOTE — PATIENT INSTRUCTIONS
Thank you for letting us take care of you today. We hope all your questions were addressed. If a question was overlooked or something else comes to mind after you return home, please contact a member of your Care Team listed below. Your Care Team at [place] [address] is Team #2  Edu Shepherd DO (Faculty)  Kimberly Parsons (Faculty)  Chang Barroso MD (Resident)  Dmitri Petit MD (Resident)  Kike Jesus MD (Resident)  Mathew Gomez MD (Resident)  Donald Reyes MD (Resident)  HIGNIIO Melgoza. ,ANNE-MARIE GILL [facility] ([address] [facility] office)  Mireille Choe, [address] (Clinical Practice Manager)  Jose Raul Almanza [facility] (Clinical Pharmacist)     Office phone number: [phone]    If you need to get in right away due to illness, please be advised we have \"Same Day\" appointments available Monday-Friday. Please call us at [phone] option #3 to schedule your \"Same Day\" appointment.

## 2021-05-14 ENCOUNTER — TELEPHONE (OUTPATIENT)
Dept: FAMILY MEDICINE CLINIC | Age: 66
End: 2021-05-14

## 2021-05-14 NOTE — TELEPHONE ENCOUNTER
Spoke with patient who states her blood pressure is running high this morning 154/118 and she has a headache with left arm pain associated with numbness and tingling traveling down the left arm. Advised patient to go to the ER immediately. Voiced understanding call completed.

## 2021-05-14 NOTE — TELEPHONE ENCOUNTER
patient called to cancel her mammogram due to headache and high BP, stated her reading was 154/113 had patient speak to HIGINIO Kapadia)

## 2021-08-17 ENCOUNTER — APPOINTMENT (OUTPATIENT)
Dept: GENERAL RADIOLOGY | Age: 66
End: 2021-08-17
Payer: MEDICARE

## 2021-08-17 ENCOUNTER — HOSPITAL ENCOUNTER (EMERGENCY)
Age: 66
Discharge: HOME | End: 2021-08-18
Attending: EMERGENCY MEDICINE
Payer: MEDICARE

## 2021-08-17 VITALS
RESPIRATION RATE: 16 BRPM | HEIGHT: 62 IN | WEIGHT: 185 LBS | SYSTOLIC BLOOD PRESSURE: 153 MMHG | TEMPERATURE: 96.4 F | BODY MASS INDEX: 34.04 KG/M2 | DIASTOLIC BLOOD PRESSURE: 94 MMHG | HEART RATE: 82 BPM | OXYGEN SATURATION: 97 %

## 2021-08-17 DIAGNOSIS — M54.50 ACUTE LOW BACK PAIN, UNSPECIFIED BACK PAIN LATERALITY, UNSPECIFIED WHETHER SCIATICA PRESENT: ICD-10-CM
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
## 2021-08-17 DIAGNOSIS — M54.2 NECK PAIN: ICD-10-CM

## 2021-08-17 PROCEDURE — 73130 X-RAY EXAM OF HAND: CPT

## 2021-08-17 PROCEDURE — 73502 X-RAY EXAM HIP UNI 2-3 VIEWS: CPT

## 2021-08-17 PROCEDURE — 99284 EMERGENCY DEPT VISIT MOD MDM: CPT

## 2021-08-17 PROCEDURE — 6370000000 HC RX 637 (ALT 250 FOR IP): Performed by: STUDENT IN AN ORGANIZED HEALTH CARE EDUCATION/TRAINING PROGRAM

## 2021-08-17 RX ORDER — ACETAMINOPHEN 500 MG
1000 TABLET ORAL ONCE
Status: COMPLETED | OUTPATIENT
Start: 2021-08-17 | End: 2021-08-17

## 2021-08-17 RX ADMIN — ACETAMINOPHEN 1000 MG: 500 TABLET ORAL at 23:32

## 2021-08-17 ASSESSMENT — ENCOUNTER SYMPTOMS
COUGH: 0
ABDOMINAL PAIN: 1
DIARRHEA: 0
WHEEZING: 0
VOMITING: 0
NAUSEA: 0
SHORTNESS OF BREATH: 0
BACK PAIN: 1

## 2021-08-17 ASSESSMENT — PAIN SCALES - GENERAL: PAINLEVEL_OUTOF10: 10

## 2021-08-17 ASSESSMENT — PAIN DESCRIPTION - PAIN TYPE: TYPE: ACUTE PAIN

## 2021-08-17 ASSESSMENT — PAIN DESCRIPTION - ORIENTATION: ORIENTATION: RIGHT

## 2021-08-17 ASSESSMENT — PAIN DESCRIPTION - LOCATION: LOCATION: BACK;HIP;NECK

## 2021-08-18 ENCOUNTER — APPOINTMENT (OUTPATIENT)
Dept: MRI IMAGING | Age: 66
End: 2021-08-18
Payer: MEDICARE

## 2021-08-18 ENCOUNTER — APPOINTMENT (OUTPATIENT)
Dept: CT IMAGING | Age: 66
End: 2021-08-18
Payer: MEDICARE

## 2021-08-18 LAB
CHP ED QC CHECK: YES
GFR NON-AFRICAN AMERICAN: >60 ML/MIN
GFR NON-AFRICAN AMERICAN: NORMAL ML/MIN
GFR SERPL CREATININE-BSD FRML MDRD: >60 ML/MIN
GFR SERPL CREATININE-BSD FRML MDRD: ABNORMAL ML/MIN/{1.73_M2}
GFR SERPL CREATININE-BSD FRML MDRD: NORMAL ML/MIN
GFR SERPL CREATININE-BSD FRML MDRD: NORMAL ML/MIN/{1.73_M2}
GLUCOSE BLD-MCNC: 184 MG/DL
GLUCOSE BLD-MCNC: 184 MG/DL (ref 65–105)
GLUCOSE BLD-MCNC: 203 MG/DL (ref 74–100)
POC BUN: 19 MG/DL (ref 8–26)
POC CREATININE WHOLE BLOOD: 0.42
POC CREATININE: 0.42 MG/DL (ref 0.51–1.19)
POC CREATININE: NORMAL MG/DL (ref 0.51–1.19)
POC HEMATOCRIT: 42 % (ref 36–46)
POC HEMOGLOBIN: 14.2 G/DL (ref 12–16)

## 2021-08-18 PROCEDURE — 72125 CT NECK SPINE W/O DYE: CPT

## 2021-08-18 PROCEDURE — 82947 ASSAY GLUCOSE BLOOD QUANT: CPT

## 2021-08-18 PROCEDURE — 3209999900 CT LUMBAR SPINE TRAUMA RECONSTRUCTION

## 2021-08-18 PROCEDURE — 85014 HEMATOCRIT: CPT

## 2021-08-18 PROCEDURE — 6360000004 HC RX CONTRAST MEDICATION: Performed by: STUDENT IN AN ORGANIZED HEALTH CARE EDUCATION/TRAINING PROGRAM

## 2021-08-18 PROCEDURE — 84520 ASSAY OF UREA NITROGEN: CPT

## 2021-08-18 PROCEDURE — 71260 CT THORAX DX C+: CPT

## 2021-08-18 PROCEDURE — 3209999900 CT THORACIC SPINE TRAUMA RECONSTRUCTION

## 2021-08-18 PROCEDURE — 70450 CT HEAD/BRAIN W/O DYE: CPT

## 2021-08-18 PROCEDURE — 82565 ASSAY OF CREATININE: CPT

## 2021-08-18 PROCEDURE — 72141 MRI NECK SPINE W/O DYE: CPT

## 2021-08-18 RX ADMIN — IOPAMIDOL 75 ML: 755 INJECTION, SOLUTION INTRAVENOUS at 02:37
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
## 2021-08-18 NOTE — ED PROVIDER NOTES
[address] HANDOFF       Handoff taken on the following patient from prior Attending Physician:Dr. Tatum Baker  Pt Name: Khanh Anders  PCP:  Sameer Forte DO    Attestation  I was available and discussed any additional care issues that arose and coordinated the management plans with the resident(s) caring for the patient during my duty period. Any areas of disagreement with resident's documentation of care or procedures are noted on the chart. I was personally present for the key portions of any/all procedures during my duty period. I have documented in the chart those procedures where I was not present during the key portions. CHIEF COMPLAINT       Chief Complaint   Patient presents with    Abdominal Pain    Back Pain    Neck Pain    Hip Pain         CURRENT MEDICATIONS     Previous Medications  Previous Medications    AMLODIPINE (NORVASC) 5 MG TABLET    Take 1 tablet by mouth daily    ASPIRIN EC 81 MG EC TABLET    Take 1 tablet by mouth daily    ATORVASTATIN (LIPITOR) 20 MG TABLET    Take 1 tablet by mouth daily    CITALOPRAM (CELEXA) 40 MG TABLET    Take 1 tablet by mouth daily    LEVOTHYROXINE (SYNTHROID) 100 MCG TABLET    Take 1 tablet by mouth daily    LISINOPRIL (PRINIVIL;ZESTRIL) 20 MG TABLET    TAKE 1 TABLET BY MOUTH DAILY     METFORMIN (GLUCOPHAGE) 1000 MG TABLET    TAKE 1 TABLET BY MOUTH 2 TIMES DAILY (WITH MEALS)     ZOSTER RECOMBINANT ADJUVANTED VACCINE (SHINGRIX) 50 MCG/0.5ML SUSR INJECTION    50 MCG IM then repeat 2-6 months. Encounter Medications  Orders Placed This Encounter   Medications    acetaminophen (TYLENOL) tablet 1,000 mg    iopamidol (ISOVUE-370) 76 % injection 75 mL       ALLERGIES     is allergic to sulfamethoxazole-trimethoprim. RECENT VITALS:   Temp: 96.4 °F (35.8 °C),  Pulse: 82, Resp: 16, BP: (!) 153/94    RADIOLOGY:   MRI CERVICAL SPINE WO CONTRAST   Final Result   1.  No acute fracture or traumatic malalignment of the cervical spine. 2. Chronic, nondisplaced fracture involving the right posterior arch of C1.   3. Multilevel cervical spondylosis, most pronounced at the C5-6 with disc   bulging eccentric to the left, which mildly indents the underlying cord. No   abnormal cord signal to suggest edema. Preserved dorsal CSF spaces. 4. Multilevel uncovertebral and facet arthropathy resulting in varying levels   of foraminal stenosis, most pronounced and severe on the right at C6-7. CT CHEST ABDOMEN PELVIS W CONTRAST   Final Result   No acute traumatic finding in the chest, abdomen, and pelvis. No acute fracture or subluxation in the thoracolumbar spine. A 1.4 cm solitary nodule in the right upper lobe, amenable to further   evaluation by PET/CT scan or tissue sampling. RECOMMENDATIONS:   PET/CT scan or tissue sampling of the solitary right upper lobe nodule. CT LUMBAR SPINE TRAUMA RECONSTRUCTION   Final Result   No acute traumatic finding in the chest, abdomen, and pelvis. No acute fracture or subluxation in the thoracolumbar spine. A 1.4 cm solitary nodule in the right upper lobe, amenable to further   evaluation by PET/CT scan or tissue sampling. RECOMMENDATIONS:   PET/CT scan or tissue sampling of the solitary right upper lobe nodule. CT THORACIC SPINE TRAUMA RECONSTRUCTION   Final Result   No acute traumatic finding in the chest, abdomen, and pelvis. No acute fracture or subluxation in the thoracolumbar spine. A 1.4 cm solitary nodule in the right upper lobe, amenable to further   evaluation by PET/CT scan or tissue sampling. RECOMMENDATIONS:   PET/CT scan or tissue sampling of the solitary right upper lobe nodule. CT HEAD WO CONTRAST   Final Result   No acute intracranial abnormality. 2.2 cm cystic lesion in the anterior left hard palate/maxilla likely of   odontogenic origin. Dental consult suggested.          CT CERVICAL SPINE WO CONTRAST Final Result   Linear lucency seen through the posterolateral aspect of the C1 ring on the   right, which could be developmental nonunion, though in the setting of acute   trauma, consider further evaluation with MR imaging of the cervical spine as   an acute nondisplaced fracture cannot be entirely excluded. No other findings suspicious for fracture. Multilevel degenerative changes seen in the cervical spine. XR HIP RIGHT (2-3 VIEWS)   Final Result   No acute abnormality of the hip. XR HAND RIGHT (MIN 3 VIEWS)   Final Result   No acute osseous abnormality. LABS:  Labs Reviewed   CREATININE W/GFR POINT OF CARE - Abnormal; Notable for the following components:       Result Value    POC Creatinine 0.42 (*)     All other components within normal limits   POCT GLUCOSE - Abnormal; Notable for the following components:    POC Glucose 203 (*)     All other components within normal limits   POC GLUCOSE FINGERSTICK - Abnormal; Notable for the following components:    POC Glucose 184 (*)     All other components within normal limits   POCT CREATININE - URINE - Normal   POCT GLUCOSE - Normal   HGB/HCT   CREATININE W/GFR POINT OF CARE   UREA N (POC)           PLAN/ TASKS OUTSTANDING       Pt with Concern for C1 fracture on CT. NS has seen. Pending MRI. WIll discuss results with them. NS will see pt. After MR showing chronic changes and cervical stenosis with no cord edema. NO surgery planned. Will allow patient to eat. Plan for discharge after NS seen. NS recommends discharge with followup to Dr. Sherren Bonito. Will remove collar at their recommendation.      (Please note that portions of this note were completed with a voice recognition program.  Efforts were made to edit the dictations but occasionally words are mis-transcribed.)    Pascual Jiménez MD, MD,   Attending Emergency Physician       Pascual Jiménez MD  08/18/21 [id number] Marielle Foster MD  08/18/21 [id number]
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
## 2021-08-18 NOTE — ED PROVIDER NOTES
Faculty Sign-Out Attestation  Handoff taken on the following patient from prior Attending Physician: Danyel Hill    I was available and discussed any additional care issues that arose and coordinated the management plans with the resident(s) caring for the patient during my duty period. Any areas of disagreement with residents documentation of care or procedures are noted on the chart. I was personally present for the key portions of any/all procedures during my duty period. I have documented in the chart those procedures where I was not present during the key portions. S/p fall, ct pending , xr pending, +/- disposition,     Mar Erickson DO  Attending Physician     Mar Erickson, DO  08/18/21 0005  ?  Abnormality on ct c spine, > neuro surgery consulted, mri pending,   If negative plan to dc home with neuro surgery clearance,   Care turned over to day shift,      Mar Erickson,   08/18/21 0431

## 2021-08-18 NOTE — ED NOTES
Pt placed on bedpan. Pt tolerated well. Pt req something for sleep and/or pain, writer to notify resident. Personal items and call light within reach. RR even and unlabored, NAD noted. Will con't to monitor.       Carole Carlton RN  08/18/21 0727

## 2021-08-25 ENCOUNTER — OFFICE VISIT (OUTPATIENT)
Dept: FAMILY MEDICINE CLINIC | Age: 66
End: 2021-08-25
Payer: MEDICARE

## 2021-08-25 VITALS
TEMPERATURE: 96.4 F | WEIGHT: 166 LBS | HEART RATE: 75 BPM | SYSTOLIC BLOOD PRESSURE: 135 MMHG | BODY MASS INDEX: 30.55 KG/M2 | HEIGHT: 62 IN | DIASTOLIC BLOOD PRESSURE: 88 MMHG

## 2021-08-25 DIAGNOSIS — M54.2 NECK PAIN: ICD-10-CM

## 2021-08-25 DIAGNOSIS — Z59.48 LACK OF ADEQUATE FOOD: ICD-10-CM

## 2021-08-25 DIAGNOSIS — E08.00 DIABETES MELLITUS DUE TO UNDERLYING CONDITION WITH HYPEROSMOLARITY WITHOUT COMA, UNSPECIFIED WHETHER LONG TERM INSULIN USE (HCC): Primary | ICD-10-CM
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
## 2021-08-25 PROCEDURE — 83036 HEMOGLOBIN GLYCOSYLATED A1C: CPT | Performed by: STUDENT IN AN ORGANIZED HEALTH CARE EDUCATION/TRAINING PROGRAM

## 2021-08-25 PROCEDURE — G8400 PT W/DXA NO RESULTS DOC: HCPCS | Performed by: STUDENT IN AN ORGANIZED HEALTH CARE EDUCATION/TRAINING PROGRAM

## 2021-08-25 PROCEDURE — G8417 CALC BMI ABV UP PARAM F/U: HCPCS | Performed by: STUDENT IN AN ORGANIZED HEALTH CARE EDUCATION/TRAINING PROGRAM

## 2021-08-25 PROCEDURE — 99213 OFFICE O/P EST LOW 20 MIN: CPT | Performed by: STUDENT IN AN ORGANIZED HEALTH CARE EDUCATION/TRAINING PROGRAM

## 2021-08-25 PROCEDURE — G8427 DOCREV CUR MEDS BY ELIG CLIN: HCPCS | Performed by: STUDENT IN AN ORGANIZED HEALTH CARE EDUCATION/TRAINING PROGRAM

## 2021-08-25 PROCEDURE — 4040F PNEUMOC VAC/ADMIN/RCVD: CPT | Performed by: STUDENT IN AN ORGANIZED HEALTH CARE EDUCATION/TRAINING PROGRAM

## 2021-08-25 PROCEDURE — 4004F PT TOBACCO SCREEN RCVD TLK: CPT | Performed by: STUDENT IN AN ORGANIZED HEALTH CARE EDUCATION/TRAINING PROGRAM

## 2021-08-25 PROCEDURE — 3017F COLORECTAL CA SCREEN DOC REV: CPT | Performed by: STUDENT IN AN ORGANIZED HEALTH CARE EDUCATION/TRAINING PROGRAM

## 2021-08-25 PROCEDURE — 1090F PRES/ABSN URINE INCON ASSESS: CPT | Performed by: STUDENT IN AN ORGANIZED HEALTH CARE EDUCATION/TRAINING PROGRAM

## 2021-08-25 PROCEDURE — 1123F ACP DISCUSS/DSCN MKR DOCD: CPT | Performed by: STUDENT IN AN ORGANIZED HEALTH CARE EDUCATION/TRAINING PROGRAM

## 2021-08-25 RX ORDER — NAPROXEN 500 MG/1
500 TABLET ORAL 2 TIMES DAILY PRN
Qty: 60 TABLET | Refills: 0 | Status: SHIPPED | OUTPATIENT
Start: 2021-08-25 | End: 2021-09-27

## 2021-08-25 RX ORDER — CANAGLIFLOZIN 300 MG/1
300 TABLET, FILM COATED ORAL
Qty: 30 TABLET | Refills: 0 | Status: SHIPPED | OUTPATIENT
Start: 2021-08-25 | End: 2021-09-27

## 2021-08-25 SDOH — ECONOMIC STABILITY - FOOD INSECURITY: OTHER SPECIFIED LACK OF ADEQUATE FOOD: Z59.48

## 2021-08-25 ASSESSMENT — ENCOUNTER SYMPTOMS
CONSTIPATION: 0
BLOOD IN STOOL: 0
ABDOMINAL PAIN: 0
SHORTNESS OF BREATH: 0
NAUSEA: 0
WHEEZING: 0
VOMITING: 0
DIARRHEA: 0
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
## 2021-08-25 NOTE — PATIENT INSTRUCTIONS
metformin is also used to lower the risk of death from heart attack, stroke, or heart failure in adults with type 2 diabetes who also have heart disease. Canagliflozin and metformin is also used to reduce the risk of end-stage kidney disease and hospitalization or death from heart problems in adults who also have kidney problems caused by type 2 diabetes. This medicine is not for treating type 1 diabetes. Canagliflozin and metformin may also be used for purposes not listed in this medication guide. What should I discuss with my healthcare provider before taking canagliflozin and metformin? You should not use this medicine if you are allergic to canagliflozin or metformin, or if you have:  · severe kidney disease; or  · ketoacidosis (call your doctor for treatment). If you need to have any type of x-ray or CT scan using a dye that is injected into your veins, you may need to temporarily stop taking canagliflozin and metformin. Canagliflozin may increase your risk of lower leg amputation,  especially if you have had a prior amputation, a foot ulcer, heart disease, circulation problems, or nerve damage. Tell your doctor if you have ever had:  · heart problems;  · a diabetic foot ulcer or amputation;  · circulation problems or nerve problems in your legs or feet;  · liver or kidney disease;  · low levels of vitamin B12 or calcium in your blood;  · bladder infections or other urination problems;  · a pancreas disorder or surgery;  · a change in your diet; or  · if you are on a low salt diet. You may develop lactic acidosis, a dangerous build-up of lactic acid in your blood. This may be more likely if you have other medical conditions, a severe infection, chronic alcoholism, or if you are 72 or older. Ask your doctor about your risk. Tell your doctor right away if you become pregnant. Canagliflozin may harm the unborn baby if you take this medicine during your second or third trimester.   Metformin may stimulate ovulation in a premenopausal woman and may increase the risk of unintended pregnancy. Talk to your doctor about your risk. You should not breastfeed while using this medicine. Canagliflozin and metformin is not approved for use by anyone younger than 25years old. How should I take canagliflozin and metformin? If you had been taking an evening dose of metformin extended-release tablets, skip your last dose the evening before you start taking canagliflozin and metformin. Follow all directions on your prescription label and read all medication guides or instruction sheets. Your doctor may occasionally change your dose. Use the medicine exactly as directed. Take with food. Swallow the extended-release tablet whole and do not crush, chew, or break it. You may have low blood sugar (hypoglycemia) and feel very hungry, dizzy, irritable, confused, anxious, or shaky. To quickly treat hypoglycemia, eat or drink a fast-acting source of sugar (fruit juice, hard candy, crackers, raisins, or non-diet soda). Your doctor may prescribe a glucagon injection kit in case you have severe hypoglycemia. Be sure your family or close friends know how to give you this injection in an emergency. Also watch for signs of high blood sugar (hyperglycemia) such as increased thirst or urination. Blood sugar levels can be affected by stress, illness, surgery, exercise, alcohol use, or skipping meals. Ask your doctor before changing your dose or medication schedule. Your doctor may have you take extra vitamin B12. Take only the amount of vitamin B12 that your doctor has prescribed. Call your doctor if you have ongoing vomiting or diarrhea, or if you are sweating more than usual. You can easily become dehydrated while taking this medicine, which can lead to severely low blood pressure or a serious electrolyte imbalance.   If you need surgery or medical tests, tell any doctor who treats you that you are using canagliflozin and metformin. You may need to stop using this medicine at least 3 days before a surgery. Ask your doctor about how to control your blood sugar during this time. Canagliflozin and metformin is only part of a treatment program that may also include diet, exercise, weight control, blood sugar testing, and special medical care. Follow your doctor's instructions very closely. Store tablets in their original container at room temperature, away from moisture and heat. Do not keep canagliflozin and metformin tablets in a daily pill box for longer than 30 days. What happens if I miss a dose? Take the medicine (with food) as soon as you can, but skip the missed dose if it is almost time for your next dose. Do not take two doses at one time. What happens if I overdose? Seek emergency medical attention or call the Poison Help line at 1-191.119.6593. You may have severely low blood sugar (extreme weakness, nausea, tremors, sweating, confusion, trouble speaking, fast heartbeats, or seizure). What should I avoid while taking canagliflozin and metformin? Avoid drinking alcohol. It lowers blood sugar and may increase your risk of lactic acidosis. What are the possible side effects of canagliflozin and metformin? Get emergency medical help if you have signs of an allergic reaction: hives; difficult breathing; swelling of your face, lips, tongue, or throat. Seek medical attention right away if you have signs of a genital infection (penis or vagina): burning, itching, odor, discharge, pain, tenderness, redness or swelling of the genital or rectal area, fever, not feeling well. These symptoms may get worse quickly. Mild symptoms of lactic acidosis may worsen over time, and this condition can be fatal. Stop taking this medicine and call your doctor right away if you have unusual muscle pain, trouble breathing, stomach pain, vomiting, irregular heart rate, dizziness, feeling cold, or feeling very weak or tired.   Call your doctor at once if you have:  · new pain, tenderness, sores, ulcers, or infections in your legs or feet;  · little or no urination;  · a light-headed feeling, like you might pass out;  · high potassium level --nausea, weakness, tingly feeling, chest pain, irregular heartbeats, muscle weakness;  · ketoacidosis (too much acid in the blood) --nausea, vomiting, stomach pain, confusion, unusual drowsiness, or trouble breathing; or  · signs of a bladder infection --pain or burning when you urinate, urine that looks cloudy, pain in pelvis or back. Older adults may be more likely to get dehydrated or have kidney problems while taking this medicine. You may be more likely to have a broken bone while using this medicine. Talk with your doctor about how to avoid the risk of fractures. Common side effects may include:  · urinating more than usual;  · headache, weakness;  · gas, stomach pain, indigestion;  · nausea, vomiting; or  · diarrhea. This is not a complete list of side effects and others may occur. Call your doctor for medical advice about side effects. You may report side effects to FDA at 5-514-FDA-3803. What other drugs will affect canagliflozin and metformin? Tell your doctor about all your other medicines, especially:  · digoxin (digitalis, Lanoxin);  · a diuretic or \"water pill\";  · insulin or other oral diabetes medications;  · rifampin;  · ritonavir; or  · seizure medicine --phenobarbital, phenytoin. This list is not complete. Other drugs may affect canagliflozin and metformin, including prescription and over-the-counter medicines, vitamins, and herbal products. Not all possible drug interactions are listed here. Where can I get more information? Your pharmacist can provide more information about canagliflozin and metformin. Remember, keep this and all other medicines out of the reach of children, never share your medicines with others, and use this medication only for the indication prescribed.    Every effort know about canagliflozin? You should not use canagliflozin if you have severe kidney disease or if you are on dialysis. Canagliflozin can cause serious infections in the penis or vagina. Get medical help right away if you have burning, itching, odor, discharge, pain, tenderness, redness or swelling of the genital or rectal area, fever, or if you don't feel well. What is canagliflozin? Canagliflozin is used together with diet and exercise to improve blood sugar control in adults with type 2 diabetes mellitus. Canagliflozin is also used to lower the risk of death from heart attack, stroke, or heart failure in adults with type 2 diabetes who also have heart disease. Canagliflozin is also used to reduce the risk of end-stage kidney disease and hospitalization or death from heart problems in adults who also have kidney problems caused by type 2 diabetes. Canagliflozin is not for treating type 1 diabetes. Canagliflozin may also be used for purposes not listed in this medication guide. What should I discuss with my healthcare provider before taking canagliflozin? You should not use canagliflozin if you are allergic to it, or if you have:  · severe kidney disease (or if you are on dialysis). Canagliflozin may increase your risk of lower leg amputation,  especially if you have had a prior amputation, a foot ulcer, heart disease, circulation problems, or nerve damage. Tell your doctor if you have ever had:  · heart problems;  · a diabetic foot ulcer or amputation;  · circulation problems or nerve problems in your legs or feet;  · kidney disease;  · liver disease;  · bladder infections or other urination problems;  · a pancreas disorder;  · if you drink often, or drink large amounts of alcohol;  · if you are eating less than usual; or  · if you are on a low salt diet. Follow your doctor's instructions about using this medicine if you are pregnant.  Blood sugar control is very important during pregnancy, and your dose needs may be different during each trimester. You should not use canagliflozin during the second or third trimester of pregnancy. You should not breastfeed while using this medicine. Canagliflozin is not approved for use by anyone younger than 25years old. How should I take canagliflozin? Follow all directions on your prescription label and read all medication guides or instruction sheets. Your doctor may occasionally change your dose. Use the medicine exactly as directed. Canagliflozin is usually taken once per day, before the first meal of the day. You may have very low blood pressure while taking this medicine. Call your doctor if you are sick with vomiting or diarrhea, or if you are sweating more than usual. Drink plenty of liquids while you are taking canagliflozin. You may have low blood sugar (hypoglycemia) and feel very hungry, dizzy, irritable, confused, anxious, or shaky. To quickly treat hypoglycemia, eat or drink a fast-acting source of sugar (fruit juice, hard candy, crackers, raisins, or non-diet soda). Your doctor may prescribe a glucagon injection kit in case you have severe hypoglycemia. Be sure your family or close friends know how to give you this injection in an emergency. Also watch for signs of high blood sugar (hyperglycemia) such as increased thirst or urination. Blood sugar levels can be affected by stress, illness, surgery, exercise, alcohol use, or skipping meals. Ask your doctor before changing your dose or medication schedule. Canagliflozin is only part of a complete treatment program that may also include diet, exercise, weight control, blood sugar testing, and special medical care. Follow your doctor's instructions very closely. This medicine can affect the results of certain medical tests. Tell any doctor who treats you that you are using canagliflozin. Store at room temperature away from moisture and heat. What happens if I miss a dose?   Take the medicine as soon as you can, but skip the missed dose if it is almost time for your next dose. Do not take two doses at one time. What happens if I overdose? Seek emergency medical attention or call the Poison Help line at 1-245.282.7335. What should I avoid while taking canagliflozin? Avoid getting up too fast from a sitting or lying position, or you may feel dizzy. What are the possible side effects of canagliflozin? Get emergency medical help if you have signs of an allergic reaction: hives; difficult breathing; swelling of your face, lips, tongue, or throat. Seek medical attention right away if you have signs of a genital infection (penis or vagina): burning, itching, odor, discharge, pain, tenderness, redness or swelling of the genital or rectal area, fever, not feeling well. These symptoms may get worse quickly. Call your doctor at once if you have:  · a light-headed feeling, like you might pass out;  · little or no urination;  · pain or burning when you urinate;  · new pain, tenderness, sores, ulcers, or infections in your legs or feet;  · high potassium --nausea, irregular heartbeats, weakness, loss of movement;  · ketoacidosis (too much acid in the blood) --nausea, vomiting, stomach pain, confusion, unusual drowsiness, or trouble breathing; or  · dehydration symptoms --dizziness, weakness, feeling light-headed (like you might pass out). You may be more likely to have a broken bone while using canagliflozin. Talk with your doctor about how to avoid the risk of fractures. Side effects may be more likely to occur in older adults. Common side effects may include:  · genital infections; or  · urinating more than usual.  This is not a complete list of side effects and others may occur. Call your doctor for medical advice about side effects. You may report side effects to FDA at 8-599-TVW-3841. What other drugs will affect canagliflozin?   Tell your doctor about all your other medicines, especially:  · insulin or other oral diabetes medicines;  · a diuretic or \"water pill\";  · digoxin, digitalis;  · rifampin;  · ritonavir; or  · seizure medication --phenobarbital, phenytoin. This list is not complete. Other drugs may affect canagliflozin, including prescription and over-the-counter medicines, vitamins, and herbal products. Not all possible drug interactions are listed here. Where can I get more information? Your pharmacist can provide more information about canagliflozin. Remember, keep this and all other medicines out of the reach of children, never share your medicines with others, and use this medication only for the indication prescribed. Every effort has been made to ensure that the information provided by [address] [place], [place] [address]can [address] is accurate, up-to-date, and complete, but no guarantee is made to that effect. Drug information contained herein may be time sensitive. [facility] information has been compiled for use by healthcare practitioners and consumers in the [place] and therefore [employer] does not warrant that uses outside of the [place] are appropriate, unless specifically indicated otherwise. [facility]'s drug information does not endorse drugs, diagnose patients or recommend therapy. [facility] drug information is an informational resource designed to assist licensed healthcare practitioners in caring for their patients and/or to serve consumers viewing this service as a supplement to, and not a substitute for, the expertise, skill, knowledge and judgment of healthcare practitioners. The absence of a warning for a given drug or drug combination in no way should be construed to indicate that the drug or drug combination is safe, effective or appropriate for any given patient. [facility] does not assume any responsibility for any aspect of healthcare administered with the aid of information [facility] provides.  The information contained herein is not intended to cover all possible uses, directions, precautions, warnings, drug interactions, allergic reactions, or adverse effects. If you have questions about the drugs you are taking, check with your doctor, nurse or pharmacist.  Copyright 1314-9039 [address]. Version: 6.01. Revision date: 9/8/2020. Care instructions adapted under license by [facility] [address] If you have questions about a medical condition or this instruction, always ask your healthcare professional. [place] [address] any warranty or liability for your use of this information.
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
## 2021-08-25 NOTE — PROGRESS NOTES
Visit Information    Have you changed or started any medications since your last visit including any over-the-counter medicines, vitamins, or herbal medicines? no   Have you stopped taking any of your medications? Is so, why? -  no  Are you having any side effects from any of your medications? - no    Have you seen any other physician or provider since your last visit?  no   Have you had any other diagnostic tests since your last visit? yes - Labs, MRI, CT, XR   Have you been seen in the emergency room and/or had an admission in a hospital since we last saw you?  yes - [facility]   Have you had your routine dental cleaning in the past 6 months?  no     Do you have an active MyChart account? If no, what is the barrier?   Yes    Patient Care Team:  Pan Mariano, DO as PCP - General (Family Medicine)  Pan Shadow, DO as PCP [facility]    Medical History Review  Past Medical, Family, and Social History reviewed and does not contribute to the patient presenting condition    Health Maintenance   Topic Date Due    Hepatitis C screen  Never done    Diabetic foot exam  Never done    Diabetic retinal exam  Never done    COVID-19 Vaccine (1) Never done    HIV screen  Never done    Cervical cancer screen  Never done    Colon cancer screen colonoscopy  Never done    Breast cancer screen  Never done    Shingles Vaccine (1 of 2) Never done    Low dose CT lung screening  Never done    DEXA (modify frequency per FRAX score)  Never done    A1C test (Diabetic or Prediabetic)  07/23/2021    Flu vaccine (1) 09/01/2021    Lipid screen  09/17/2021    Annual Wellness Visit (AWV)  10/20/2021    Diabetic microalbuminuria test  04/23/2022    Potassium monitoring  04/23/2022    Creatinine monitoring  08/18/2022    Pneumococcal 65+ years Vaccine (1 of 1 - PPSV23) 10/02/2025    DTaP/Tdap/Td vaccine (2 - Td or Tdap) 10/02/2030    Hepatitis A vaccine  Aged Out    Hib vaccine  Aged C/ Anuj Mario 19 Meningococcal (ACWY) vaccine  Aged Out

## 2021-08-30 LAB — HBA1C MFR BLD: 9.6 %

## 2021-08-31 ENCOUNTER — TELEPHONE (OUTPATIENT)
Dept: FAMILY MEDICINE CLINIC | Age: 66
End: 2021-08-31
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
## 2021-09-08 NOTE — TELEPHONE ENCOUNTER
Collect details for PASSPORT Referral to [facility] on [facility].     Michael Ni

## 2021-09-10 ENCOUNTER — HOSPITAL ENCOUNTER (OUTPATIENT)
Dept: DIABETES SERVICES | Age: 66
Setting detail: THERAPIES SERIES
Discharge: HOME | End: 2021-09-10
Payer: MEDICARE

## 2021-09-10 PROCEDURE — G0108 DIAB MANAGE TRN  PER INDIV: HCPCS

## 2021-09-10 SDOH — ECONOMIC STABILITY: FOOD INSECURITY: ADDITIONAL INFORMATION: YES

## 2021-09-10 ASSESSMENT — PROBLEM AREAS IN DIABETES QUESTIONNAIRE (PAID)
FEELING SCARED WHEN YOU THINK ABOUT LIVING WITH DIABETES: 3
PAID-5 TOTAL SCORE: 16
FEELING DEPRESSED WHEN YOU THINK ABOUT LIVING WITH DIABETES: 2
WORRYING ABOUT THE FUTURE AND THE POSSIBILITY OF SERIOUS COMPLICATIONS: 3
FEELING THAT DIABETES IS TAKING UP TOO MUCH OF YOUR MENTAL AND PHYSICAL ENERGY EVERY DAY: 4
COPING WITH COMPLICATIONS OF DIABETES: 4

## 2021-09-10 NOTE — TELEPHONE ENCOUNTER
Kenny Montague returned call from 9/8 in which I asked for details surrounding the PASSPORT application/ referral.     While on the phone, I describe the services/ resources collected and in the mail. At this time the patient has not received the packets. Patient stated that she has been sleeping a lot lately due to some underlying depression. Patient stated that the depression is usually seasonal as it always comes around this time of year. Patient was grateful for the services provided and the writer informed the patient that she can call any time regarding the resources collected and any future hardships. The writer will check in with the patient in 1 week to see if the patient has received the packets of resources.      Dell Becerra

## 2021-09-10 NOTE — LETTER
[facility]  [facility] [address]  [facility] [address]  Phone: [phone]         September 10, 2021    To Bri Stallings DO  From: REMEDIOS BROOKE RN    Patient: Prabhu Braxton   YOB: 1955   Date of Visit: 9/10/21     An initial assessment to determine diabetes education needs was completed. Education included:interpretation of lab results, blood sugar goals, complications of diabetes mellitus, hypoglycemia prevention and treatment, exercise, illness management, nutrition and carbohydrate counting. An ongoing plan was created to include:individual follow up    Thank you for the opportunity to provide Diabetes Self Management Education to your patient. Prabhu Braxton completed a Diabetes Self- Management Education Assessment on 9/10/21. Part of our assessment is having the patient complete the PAID (Problem Areas in Diabetes Scale)-5 survey. This tool  measures diabetes-related emotional distress a patient may be feeling. Prabhu Braxton scored 21   A total score of >8 indicates possible diabetes related emotional distress, which warrants further assessment and a referral to mental health professional for psychological support and treatment.
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
## 2021-09-13 NOTE — TELEPHONE ENCOUNTER
Called patient and left a VM regarding Pete Santamaria at [employer] [facility] A[facility]. Informed the patient of the followin.) Senior [employer] Nutrition Program: coupon booklet valid until .   2.) Pete Santamaria will be calling Bessieia Michael to refer her for home delivered meals. 3.) Pete Santamaria will be mailing packet/ booklet for services designed for Seniors     Left contact number in case the patient has any questions or concerns.      Norberto Taylor

## 2021-09-17 ENCOUNTER — HOSPITAL ENCOUNTER (OUTPATIENT)
Dept: DIABETES SERVICES | Age: 66
Setting detail: THERAPIES SERIES
Discharge: HOME | End: 2021-09-17
Payer: MEDICARE

## 2021-09-17 PROCEDURE — G0108 DIAB MANAGE TRN  PER INDIV: HCPCS
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
## 2021-09-17 NOTE — TELEPHONE ENCOUNTER
Called to check in with the patient- left VM. Provided the patient with the contact number to my desk in case she has any questions/ concerns with resources collected. Referral for SDOH will be closed.      Mitzy Nuno

## 2021-09-27 ENCOUNTER — OFFICE VISIT (OUTPATIENT)
Dept: FAMILY MEDICINE CLINIC | Age: 66
End: 2021-09-27
Payer: MEDICARE

## 2021-09-27 VITALS
SYSTOLIC BLOOD PRESSURE: 128 MMHG | WEIGHT: 165.6 LBS | HEART RATE: 75 BPM | BODY MASS INDEX: 30.28 KG/M2 | DIASTOLIC BLOOD PRESSURE: 86 MMHG

## 2021-09-27 DIAGNOSIS — Z13.220 LIPID SCREENING: ICD-10-CM

## 2021-09-27 DIAGNOSIS — R53.83 FATIGUE, UNSPECIFIED TYPE: ICD-10-CM

## 2021-09-27 DIAGNOSIS — Z11.59 NEED FOR HEPATITIS C SCREENING TEST: ICD-10-CM

## 2021-09-27 DIAGNOSIS — E08.00 DIABETES MELLITUS DUE TO UNDERLYING CONDITION WITH HYPEROSMOLARITY WITHOUT COMA, UNSPECIFIED WHETHER LONG TERM INSULIN USE (HCC): Primary | ICD-10-CM

## 2021-09-27 DIAGNOSIS — R91.1 PULMONARY NODULE: ICD-10-CM

## 2021-09-27 DIAGNOSIS — Z00.00 HEALTH MAINTENANCE EXAMINATION: ICD-10-CM

## 2021-09-27 DIAGNOSIS — E03.9 HYPOTHYROIDISM, UNSPECIFIED TYPE: ICD-10-CM

## 2021-09-27 LAB
CHP ED QC CHECK: ABNORMAL
GLUCOSE BLD-MCNC: 208 MG/DL

## 2021-09-27 PROCEDURE — G8400 PT W/DXA NO RESULTS DOC: HCPCS | Performed by: STUDENT IN AN ORGANIZED HEALTH CARE EDUCATION/TRAINING PROGRAM

## 2021-09-27 PROCEDURE — 82962 GLUCOSE BLOOD TEST: CPT | Performed by: STUDENT IN AN ORGANIZED HEALTH CARE EDUCATION/TRAINING PROGRAM

## 2021-09-27 PROCEDURE — 4040F PNEUMOC VAC/ADMIN/RCVD: CPT | Performed by: STUDENT IN AN ORGANIZED HEALTH CARE EDUCATION/TRAINING PROGRAM

## 2021-09-27 PROCEDURE — 3017F COLORECTAL CA SCREEN DOC REV: CPT | Performed by: STUDENT IN AN ORGANIZED HEALTH CARE EDUCATION/TRAINING PROGRAM

## 2021-09-27 PROCEDURE — 4004F PT TOBACCO SCREEN RCVD TLK: CPT | Performed by: STUDENT IN AN ORGANIZED HEALTH CARE EDUCATION/TRAINING PROGRAM

## 2021-09-27 PROCEDURE — 1123F ACP DISCUSS/DSCN MKR DOCD: CPT | Performed by: STUDENT IN AN ORGANIZED HEALTH CARE EDUCATION/TRAINING PROGRAM

## 2021-09-27 PROCEDURE — 1090F PRES/ABSN URINE INCON ASSESS: CPT | Performed by: STUDENT IN AN ORGANIZED HEALTH CARE EDUCATION/TRAINING PROGRAM

## 2021-09-27 PROCEDURE — G8427 DOCREV CUR MEDS BY ELIG CLIN: HCPCS | Performed by: STUDENT IN AN ORGANIZED HEALTH CARE EDUCATION/TRAINING PROGRAM

## 2021-09-27 PROCEDURE — G8417 CALC BMI ABV UP PARAM F/U: HCPCS | Performed by: STUDENT IN AN ORGANIZED HEALTH CARE EDUCATION/TRAINING PROGRAM

## 2021-09-27 PROCEDURE — 99213 OFFICE O/P EST LOW 20 MIN: CPT | Performed by: STUDENT IN AN ORGANIZED HEALTH CARE EDUCATION/TRAINING PROGRAM

## 2021-09-27 RX ORDER — GLUCOSAMINE HCL/CHONDROITIN SU 500-400 MG
CAPSULE ORAL
Qty: 100 STRIP | Refills: 3 | Status: SHIPPED | OUTPATIENT
Start: 2021-09-27 | End: 2022-01-03

## 2021-09-27 RX ORDER — LANCETS 30 GAUGE
1 EACH MISCELLANEOUS DAILY
Qty: 100 EACH | Refills: 0 | Status: SHIPPED | OUTPATIENT
Start: 2021-09-27 | End: 2022-01-03 | Stop reason: SDUPTHER

## 2021-09-27 RX ORDER — GLIPIZIDE 5 MG/1
TABLET ORAL
Qty: 60 TABLET | Refills: 3 | Status: SHIPPED | OUTPATIENT
Start: 2021-09-27 | End: 2021-12-02

## 2021-09-27 SDOH — ECONOMIC STABILITY: FOOD INSECURITY: WITHIN THE PAST 12 MONTHS, THE FOOD YOU BOUGHT JUST DIDN'T LAST AND YOU DIDN'T HAVE MONEY TO GET MORE.: NEVER TRUE

## 2021-09-27 SDOH — ECONOMIC STABILITY: FOOD INSECURITY: WITHIN THE PAST 12 MONTHS, YOU WORRIED THAT YOUR FOOD WOULD RUN OUT BEFORE YOU GOT MONEY TO BUY MORE.: NEVER TRUE

## 2021-09-27 ASSESSMENT — ENCOUNTER SYMPTOMS
CONSTIPATION: 0
SHORTNESS OF BREATH: 0
DIARRHEA: 0
WHEEZING: 0
ABDOMINAL PAIN: 0

## 2021-09-27 ASSESSMENT — SOCIAL DETERMINANTS OF HEALTH (SDOH): HOW HARD IS IT FOR YOU TO PAY FOR THE VERY BASICS LIKE FOOD, HOUSING, MEDICAL CARE, AND HEATING?: NOT VERY HARD

## 2021-09-27 NOTE — PROGRESS NOTES
Diabetic visit information    BP Readings from Last 3 Encounters:   08/25/21 135/88   08/17/21 (!) 153/94   05/12/21 (!) 156/93       Hemoglobin A1C (%)   Date Value   08/25/2021 9.6   04/23/2021 9.2   09/17/2020 8.9 (H)     Microalb/Crt. Ratio (mcg/mg creat)   Date Value   04/23/2021 83 (H)     LDL Cholesterol (mg/dL)   Date Value   09/17/2020                    Have you changed or started any medications since your last visit including any over-the-counter medicines, vitamins, or herbal medicines? no   Have you stopped taking any of your medications? Is so, why? -  no  Are you having any side effects from any of your medications? - no    Have you seen any other physician or provider since your last visit?  no   Have you had any other diagnostic tests since your last visit?  no   Have you been seen in the emergency room and/or had an admission in a hospital since we last saw you?  no     Have you had your annual diabetic retinal (eye) exam? No   (ensure copy of exam is in the chart)    Have you had your routine dental cleaning in the past 6 months? no    Do you have an active MyChart account? If not, what are your barriers? Yes    Patient Care Team:  Moriah Silva DO as PCP - General (Family Medicine)  Moriah Silva DO as PCP - [facility] Provider    Medical history Review  Past Medical, Family, and Social History reviewed and does not contribute to the patient presenting condition.     Health Maintenance   Topic Date Due    Hepatitis C screen  Never done    Diabetic foot exam  Never done    Diabetic retinal exam  Never done    COVID-19 Vaccine (1) Never done    HIV screen  Never done    Cervical cancer screen  Never done    Colon cancer screen colonoscopy  Never done    Breast cancer screen  Never done    Shingles Vaccine (1 of 2) Never done    Low dose CT lung screening  Never done    DEXA (modify frequency per FRAX score)  Never done    Flu vaccine (1) 09/01/2021    Lipid screen 09/17/2021    Annual Wellness Visit (AWV)  10/20/2021    A1C test (Diabetic or Prediabetic)  11/25/2021    Diabetic microalbuminuria test  04/23/2022    Potassium monitoring  04/23/2022    Creatinine monitoring  08/18/2022    Pneumococcal 65+ years Vaccine (1 of 1 - PPSV23) 10/02/2025    DTaP/Tdap/Td vaccine (2 - Td or Tdap) 10/02/2030    Hepatitis A vaccine  Aged Out    Hib vaccine  Aged Out    Meningococcal (ACWY) vaccine  Aged Out

## 2021-09-27 NOTE — PROGRESS NOTES
Subjective:    Marlena Severs is a 72 y.o. female with  has no past medical history on file. Presented to the office today for:  Chief Complaint   Patient presents with    1 Month Follow-Up     too soon for A1C - here for a follow up    [facility]     Patient is a 27-year-old female here today for 1 month follow-up. Type 2 diabetes:  Patient's hemoglobin A1c 8/25/2021 9.6; patient's insurance will not cover repeat hemoglobin A1c. Covers every 90 days  Patient states she is compliant with taking Metformin 1000 twice daily  Patient was unable to  Invokana due to to insurance not covering. Other medications were sent in the same class was not covered. Patient has been working with diabetes education and states she loves education and is getting a better understanding of diabetes  Patient is currently also working with social work with financial issues and food shortage  Diabetic eye exam patient was scheduled this year  Diabetic foot exam done 9/27/2021    History:  CT chest and abdomen in the ED showed the following  A 1.4 cm solitary nodule in the right upper lobe, amenable to further   evaluation by PET/CT scan or tissue sampling. 2018 the patient follows up with [facility] for CT core needle biopsy negative. Review of Systems   Constitutional: Negative for activity change, appetite change, chills and fever. Respiratory: Negative for shortness of breath and wheezing. Cardiovascular: Negative for chest pain, palpitations and leg swelling. Gastrointestinal: Negative for abdominal pain, constipation and diarrhea. Genitourinary: Negative for difficulty urinating. Neurological: Negative for dizziness, weakness, numbness and headaches. The patient has a No family history on file.     Objective:    /86   Pulse 75   Wt 165 lb 9.6 oz (75.1 kg)   BMI 30.28 kg/m²    BP Readings from Last 3 Encounters:   09/27/21 128/86   08/25/21 135/88   08/17/21 (!) 153/94       Physical Exam  Vitals reviewed. Constitutional:       Appearance: Normal appearance. Cardiovascular:      Rate and Rhythm: Normal rate and regular rhythm. Pulses: Normal pulses. Heart sounds: Normal heart sounds. Pulmonary:      Effort: Pulmonary effort is normal.      Breath sounds: Normal breath sounds. No wheezing. Skin:     General: Skin is warm. Neurological:      Mental Status: She is alert. Visual inspection:  Deformity/amputation: absent  Skin lesions/pre-ulcerative calluses: absent    Sensory exam:  Monofilament sensation: normal  (minimum of 5 random plantar locations tested, avoiding callused areas - > 1 area with absence of sensation is + for neuropathy)    Plus at least one of the following:  Pulses: normal,   Pinprick: Intact  Proprioception: Intact    Lab Results   Component Value Date    CHOL 182 09/17/2020    TRIG 452 (H) 09/17/2020    HDL 38 (L) 09/17/2020    LDLDIRECT 57 09/17/2020     04/23/2021    K 4.3 04/23/2021     04/23/2021    CREATININE 0.42 (L) 08/18/2021    BUN 16 04/23/2021    CO2 26 04/23/2021    TSH 4.94 09/17/2020    LABA1C 9.6 08/25/2021    LABMICR 83 (H) 04/23/2021     Lab Results   Component Value Date    CALCIUM 9.3 04/23/2021     Lab Results   Component Value Date    LDLCHOLESTEROL      09/17/2020    LDLDIRECT 57 09/17/2020       Assessment and Plan:    1. Diabetes mellitus due to underlying condition with hyperosmolarity without coma, unspecified whether long term insulin use ([facility] [address]  8/25/2021 hemoglobin A1c 9.6 insurance does not cover repeat hemoglobin A1c only every 90 days. Follow-up in 2 months for repeat  Previously patient was prescribed Invokana and other medications in the same class not covered by insurance  Having difficulty getting medications covered by patient's insurance. We will try sulfonylurea  Patient educated on signs and symptoms of hypoglycemia and treatment.   Patient was able to teach back  Patient is currently working with diabetes education weekly  - POCT Glucose  - [facility] DIABETES FOOT Noelle Wilson [facility]  - blood glucose monitor strips; Test 1-2 times a day & as needed for symptoms of irregular blood glucose. Dispense sufficient amount for indicated testing frequency plus additional to accommodate PRN testing needs. Dispense: 100 strip; Refill: 3  - Lancets MISC; 1 each by Does not apply route daily  Dispense: 100 each; Refill: 0  - Alcohol prep pad    2. Pulmonary nodule  CT chest 2021  A 1.4 cm solitary nodule in the right upper lobe, amenable to further   evaluation by PET/CT scan or tissue sampling.  the patient follows up with [facility] for CT core needle biopsy negative. - Low Dose Chest CT -Abnormal Lung Screen Follow up; Future    3. Fatigue, unspecified type  - CBC With Auto Differential; Future  - Basic Metabolic Panel; Future    4. Need for hepatitis C screening test  - Hepatitis C Antibody; Future    5. Lipid screening  - Lipid Panel; Future    6. Hypothyroidism, unspecified type  Continue Synthroid  - TSH With Reflex Ft4; Future    7. Health maintenance examination  Due to multiple chronic conditions and social issues will address care gaps 1 at a time  Diabetic foot exam completed today  Patient will schedule an appointment for diabetic eye exam  Cervical cancer screening: Discuss at next visit  Colon cancer screening: Discuss at next visit  Declined vaccination  - HIV Screen; Future           Requested Prescriptions     Signed Prescriptions Disp Refills    glipiZIDE (GLUCOTROL) 5 MG tablet 60 tablet 3     Sig: Take 1 tablet by mouth for 1 week  Take 1 tablet by mouth bid therefore    blood glucose monitor strips 100 strip 3     Sig: Test 1-2 times a day & as needed for symptoms of irregular blood glucose. Dispense sufficient amount for indicated testing frequency plus additional to accommodate PRN testing needs.     Lancets MISC 100 each 0     Si each by Does not apply route daily       Medications Discontinued During This Encounter   Medication Reason    naproxen (NAPROSYN) 500 MG tablet LIST CLEANUP    canagliflozin (INVOKANA) 300 MG TABS tablet LIST CLEANUP       Steffen Melgar received counseling on the following healthy behaviors: nutrition, exercise and medication adherence    Discussed use,benefit, and side effects of prescribed medications. Barriers to medication compliance addressed. All patient questions answered. Pt voiced understanding. Return in about 2 months (around 11/27/2021), or dm follow up. Disclaimer: Some orall of this note was transcribed using voice-recognition software. This may cause typographical errors occasionally. Although all effort is made to fix these errors, please do not hesitate to contact our office if there Kobi Broach concern with the understanding of this note.

## 2021-09-27 NOTE — PATIENT INSTRUCTIONS
Patient Education        glipizide  Pronunciation:  GLIP mayelin beckham  Brand:  Glucotrol  What is the most important information I should know about glipizide? You should not use glipizide if you have diabetic ketoacidosis (call your doctor for treatment). What is glipizide? Glipizide is used together with diet and exercise to improve blood sugar control in adults with type 2 diabetes mellitus. Glipizide is not for treating type 1 diabetes. Glipizide may also be used for purposes not listed in this medication guide. What should I discuss with my healthcare provider before taking glipizide? You should not use this medicine if you are allergic to glipizide, or if you have diabetic ketoacidosis (call your doctor for treatment). Tell your doctor if you have ever had:  · liver or kidney disease;  · chronic diarrhea, or a blockage in your intestines; or  · an enzyme deficiency called glucose-6-phosphate dehydrogenase deficiency (G6PD). Follow your doctor's instructions about using this medicine if you are pregnant or you become pregnant. Controlling diabetes is very important during pregnancy, and having high blood sugar may cause complications in both the mother and the baby. However, you may need to stop taking glipizide for a short time just before your due date. It may not be safe to breastfeed while using this medicine. Ask your doctor about any risk. How should I take glipizide? Follow all directions on your prescription label and read all medication guides or instruction sheets. Your doctor may occasionally change your dose. Use the medicine exactly as directed. Take the glipizide regular tablet 30 minutes before your first meal of the day. Take the glipizide extended-release tablet with your first meal of the day. Swallow the tablet whole and do not crush, chew, or break it. Your blood sugar will need to be checked often, and you may need other blood tests at your doctor's office.   You may have low difficulty breathing; swelling of your face, lips, tongue, or throat. Call your doctor at once if you have symptoms of low blood sugar:  · headache, irritability  · sweating, fast heart rate;  · dizziness, nausea; or  · hunger, feeling anxious or shaky. Common side effects may include:  · diarrhea, constipation, gas;  · dizziness, drowsiness;  · tremors; or  · skin rash, redness, or itching. This is not a complete list of side effects and others may occur. Call your doctor for medical advice about side effects. You may report side effects to FDA at 8-748-FDA-5654. What other drugs will affect glipizide? Sometimes it is not safe to use certain medications at the same time. Some drugs can affect your blood levels of other drugs you take, which may increase side effects or make the medications less effective. Many drugs can affect glipizide. This includes prescription and over-the-counter medicines, vitamins, and herbal products. Not all possible interactions are listed here. Tell your doctor about all your current medicines and any medicine you start or stop using. Where can I get more information? Your pharmacist can provide more information about glipizide. Remember, keep this and all other medicines out of the reach of children, never share your medicines with others, and use this medication only for the indication prescribed. Every effort has been made to ensure that the information provided by [address] is accurate, up-to-date, and complete, but no guarantee is made to that effect. Drug information contained herein may be time sensitive. [facility] information has been compiled for use by healthcare practitioners and consumers in the [place] and therefore [facility] does not warrant that uses outside of the [place] are appropriate, unless specifically indicated otherwise. [facility] drug information does not endorse drugs, diagnose patients or recommend therapy.  [facility] drug information is an informational resource designed to assist licensed healthcare practitioners in caring for their patients and/or to serve consumers viewing this service as a supplement to, and not a substitute for, the expertise, skill, knowledge and judgment of healthcare practitioners. The absence of a warning for a given drug or drug combination in no way should be construed to indicate that the drug or drug combination is safe, effective or appropriate for any given patient. [facility] does not assume any responsibility for any aspect of healthcare administered with the aid of information [facility] provides. The information contained herein is not intended to cover all possible uses, directions, precautions, warnings, drug interactions, allergic reactions, or adverse effects. If you have questions about the drugs you are taking, check with your doctor, nurse or pharmacist.  Copyright 2212-4534 [address] [place] [address] Dr THAO. Version: 11.02. Revision date: 3/31/2020. Care instructions adapted under license by [facility] ([facility]). If you have questions about a medical condition or this instruction, always ask your healthcare professional. [place] [address] any warranty or liability for your use of this information.

## 2021-09-27 NOTE — PROGRESS NOTES
Attending Physician Statement  I have discussed the care of Porsche Bal 72 y.o. female, including pertinent history and exam findings, with the resident Dr. Katey Healy MD.    History and Exam:   Chief Complaint   Patient presents with    1 Month Follow-Up     too soon for A1C - here for a follow up    [address]       No past medical history on file. Allergies   Allergen Reactions    Sulfamethoxazole-Trimethoprim Rash      I have seen and examined the patient and the key elements of the encounter have been performed by me. BP Readings from Last 3 Encounters:   09/27/21 128/86   08/25/21 135/88   08/17/21 (!) 153/94     /86   Pulse 75   Wt 165 lb 9.6 oz (75.1 kg)   BMI 30.28 kg/m²   Lab Results   Component Value Date    CHOL 182 09/17/2020    TRIG 452 (H) 09/17/2020    HDL 38 (L) 09/17/2020    LDLDIRECT 57 09/17/2020     04/23/2021    K 4.3 04/23/2021     04/23/2021    CREATININE 0.42 (L) 08/18/2021    BUN 16 04/23/2021    CO2 26 04/23/2021    TSH 4.94 09/17/2020    LABA1C 9.6 08/25/2021    LABMICR 83 (H) 04/23/2021     No results found for: LABPROT, LABALBU  No results found for: IRON, TIBC, FERRITIN  Lab Results   Component Value Date    LDLCHOLESTEROL      09/17/2020    LDLDIRECT 57 09/17/2020     I agree with the assessment, plan and the diagnosis of    Diagnosis Orders   1. Diabetes mellitus due to underlying condition with hyperosmolarity without coma, unspecified whether long term insulin use ([address]  POCT Glucose    [address] [place] [facility]    blood glucose monitor strips    Lancets MISC    Alcohol prep pad   2. Pulmonary nodule  Low Dose Chest CT -Abnormal Lung Screen Follow up   3. Fatigue, unspecified type  CBC With Auto Differential    Basic Metabolic Panel   4. Need for hepatitis C screening test  Hepatitis C Antibody   5. Lipid screening  Lipid Panel   6. Hypothyroidism, unspecified type  TSH With Reflex Ft4   7.  Health maintenance examination  HIV Screen    . I agree with orders as documented by the resident. More than 25 minutes spent  in face to face encounter with the patient and more than half in counseling. Patient's questions were answered. Patient Voiced understanding to the counseling. Return in about 2 months (around 11/27/2021), or dm follow up.    (GC Modifier)-Dr. Jay Cabrera MD

## 2021-09-28 ENCOUNTER — TELEPHONE (OUTPATIENT)
Dept: FAMILY MEDICINE CLINIC | Age: 66
End: 2021-09-28

## 2021-09-28 RX ORDER — BLOOD SUGAR DIAGNOSTIC
1 STRIP MISCELLANEOUS DAILY
Qty: 100 EACH | Refills: 3 | Status: SHIPPED | OUTPATIENT
Start: 2021-09-28 | End: 2022-01-03 | Stop reason: SDUPTHER

## 2021-09-28 RX ORDER — BLOOD-GLUCOSE METER
EACH MISCELLANEOUS
Qty: 1 KIT | Refills: 0 | Status: SHIPPED | OUTPATIENT
Start: 2021-09-28

## 2021-09-28 NOTE — TELEPHONE ENCOUNTER
Pharmacy contacting office because patients insurance will only cover Accu-Check or True Metrix glucometer. Please change to one of them. Patient needs strips and meter.

## 2021-10-01 ENCOUNTER — HOSPITAL ENCOUNTER (OUTPATIENT)
Dept: DIABETES SERVICES | Age: 66
Setting detail: THERAPIES SERIES
Discharge: HOME | End: 2021-10-01
Payer: MEDICARE

## 2021-10-01 PROCEDURE — G0108 DIAB MANAGE TRN  PER INDIV: HCPCS

## 2021-10-01 NOTE — PROGRESS NOTES
Diabetic Report Card for Rosa Johnston    Hemoglobin A1C:   Your Hemoglobin A1C values should be less than 7. If these are greater than 7, you have a higher chance of having eye, heart, and kidney problems in the future. Your most recent Hemoglobin A1C values were:   Lab Results   Component Value Date    LABA1C 9.6 08/25/2021    LABA1C 9.2 04/23/2021    LABA1C 8.9 (H) 09/17/2020          Blood Pressure: Your blood pressure values should be less than 140/90. Your most recent blood pressure readings were:   BP Readings from Last 3 Encounters:   09/27/21 128/86   08/25/21 135/88   08/17/21 (!) 153/94         Cholesterol: Your LDL Cholesterol (bad cholesterol) values should be less than 100. Lab Results   Component Value Date    LDLCHOLESTEROL      09/17/2020    LDLDIRECT 57 09/17/2020         Urine Protein:  Your urine protein values should be less than  0- 19. If they are consistently above this value, your chance of kidney problems increases yearly. Lab Results   Component Value Date    LABMICR 83 (H) 04/23/2021       Weight History:   Maintaining a healthy weight helps control your diabetes. Talking with your health care provider to achieve and maintain a healthy weight. IF you are having difficulty achieving your weight goals, [phone] [facility] may be able to help, call [phone] for more information.   Wt Readings from Last 3 Encounters:   09/27/21 165 lb 9.6 oz (75.1 kg)   08/25/21 166 lb (75.3 kg)   08/17/21 185 lb (83.9 kg)
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
## 2021-10-07 ENCOUNTER — TELEPHONE (OUTPATIENT)
Dept: FAMILY MEDICINE CLINIC | Age: 66
End: 2021-10-07

## 2021-10-07 NOTE — TELEPHONE ENCOUNTER
----- Message from Sade Murillo sent at 10/7/2021 12:17 PM EDT -----  Subject: Message to Provider    QUESTIONS  Information for Provider? Pt would like for Danay to please call her back   as soon as possible about a medication. She said she has been trying to   call someone for 3 weeks in regard to a medication her dr was prescribing   her.   ---------------------------------------------------------------------------  --------------  [address]  What is the best way for the office to contact you? OK to leave message on   voicemail  Preferred Call Back Phone Number? [phone]  ---------------------------------------------------------------------------  --------------  SCRIPT ANSWERS  Relationship to Patient?  Self

## 2021-10-20 ENCOUNTER — HOSPITAL ENCOUNTER (OUTPATIENT)
Dept: DIABETES SERVICES | Age: 66
Setting detail: THERAPIES SERIES
Discharge: HOME | End: 2021-10-20
Payer: MEDICARE

## 2021-10-20 DIAGNOSIS — E08.00 DIABETES MELLITUS DUE TO UNDERLYING CONDITION WITH HYPEROSMOLARITY WITHOUT NONKETOTIC HYPERGLYCEMIC-HYPEROSMOLAR COMA (NKHHC) (HCC): Primary | ICD-10-CM

## 2021-10-20 PROCEDURE — G0108 DIAB MANAGE TRN  PER INDIV: HCPCS
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
## 2021-10-27 DIAGNOSIS — I10 ESSENTIAL HYPERTENSION: ICD-10-CM

## 2021-10-27 DIAGNOSIS — E08.00 DIABETES MELLITUS DUE TO UNDERLYING CONDITION WITH HYPEROSMOLARITY WITHOUT COMA, UNSPECIFIED WHETHER LONG TERM INSULIN USE (HCC): ICD-10-CM

## 2021-10-27 RX ORDER — AMLODIPINE BESYLATE 5 MG/1
5 TABLET ORAL DAILY
Qty: 30 TABLET | Refills: 3 | Status: SHIPPED | OUTPATIENT
Start: 2021-10-27 | End: 2022-01-03 | Stop reason: SDUPTHER

## 2021-10-27 RX ORDER — LISINOPRIL 20 MG/1
20 TABLET ORAL DAILY
Qty: 30 TABLET | Refills: 3 | Status: SHIPPED | OUTPATIENT
Start: 2021-10-27 | End: 2022-01-03 | Stop reason: SDUPTHER

## 2021-10-27 NOTE — TELEPHONE ENCOUNTER
DIGITAL SCREEN W OR WO CAD BILATERAL Once 11/11/2021   Hemoglobin A1C Once 01/04/2022   Lipid Panel Once 09/27/2022   CBC With Auto Differential Once 54/17/5365   Basic Metabolic Panel Once 80/17/7134   Hepatitis C Antibody Once 09/27/2022   Low Dose Chest CT -Abnormal Lung Screen Follow up Once 09/27/2022   TSH With Reflex Ft4 Once 09/27/2022   HIV Screen Once 09/27/2022               Patient Active Problem List:     Dysuria     Urinary tract infection with hematuria

## 2021-11-09 DIAGNOSIS — B37.31 VAGINA, CANDIDIASIS: ICD-10-CM

## 2021-11-09 DIAGNOSIS — H60.312 ACUTE DIFFUSE OTITIS EXTERNA OF LEFT EAR: ICD-10-CM

## 2021-11-11 RX ORDER — FLUCONAZOLE 150 MG/1
150 TABLET ORAL
Qty: 2 TABLET | Refills: 0 | OUTPATIENT
Start: 2021-11-11 | End: 2021-11-17

## 2021-11-11 RX ORDER — AMOXICILLIN 500 MG/1
500 CAPSULE ORAL 3 TIMES DAILY
Qty: 30 CAPSULE | Refills: 0 | OUTPATIENT
Start: 2021-11-11 | End: 2021-11-21

## 2021-11-15 ENCOUNTER — OFFICE VISIT (OUTPATIENT)
Dept: FAMILY MEDICINE CLINIC | Age: 66
End: 2021-11-15
Payer: MEDICARE

## 2021-11-15 DIAGNOSIS — E08.00 DIABETES MELLITUS DUE TO UNDERLYING CONDITION WITH HYPEROSMOLARITY WITHOUT COMA, UNSPECIFIED WHETHER LONG TERM INSULIN USE (HCC): ICD-10-CM

## 2021-11-15 DIAGNOSIS — J01.81 OTHER ACUTE RECURRENT SINUSITIS: Primary | ICD-10-CM

## 2021-11-15 PROCEDURE — G8400 PT W/DXA NO RESULTS DOC: HCPCS | Performed by: STUDENT IN AN ORGANIZED HEALTH CARE EDUCATION/TRAINING PROGRAM

## 2021-11-15 PROCEDURE — 4040F PNEUMOC VAC/ADMIN/RCVD: CPT | Performed by: STUDENT IN AN ORGANIZED HEALTH CARE EDUCATION/TRAINING PROGRAM

## 2021-11-15 PROCEDURE — 3017F COLORECTAL CA SCREEN DOC REV: CPT | Performed by: STUDENT IN AN ORGANIZED HEALTH CARE EDUCATION/TRAINING PROGRAM

## 2021-11-15 PROCEDURE — G8484 FLU IMMUNIZE NO ADMIN: HCPCS | Performed by: STUDENT IN AN ORGANIZED HEALTH CARE EDUCATION/TRAINING PROGRAM

## 2021-11-15 PROCEDURE — 99442 PR PHYS/QHP TELEPHONE EVALUATION 11-20 MIN: CPT | Performed by: STUDENT IN AN ORGANIZED HEALTH CARE EDUCATION/TRAINING PROGRAM

## 2021-11-15 PROCEDURE — G8427 DOCREV CUR MEDS BY ELIG CLIN: HCPCS | Performed by: STUDENT IN AN ORGANIZED HEALTH CARE EDUCATION/TRAINING PROGRAM

## 2021-11-15 PROCEDURE — 4004F PT TOBACCO SCREEN RCVD TLK: CPT | Performed by: STUDENT IN AN ORGANIZED HEALTH CARE EDUCATION/TRAINING PROGRAM

## 2021-11-15 PROCEDURE — 1123F ACP DISCUSS/DSCN MKR DOCD: CPT | Performed by: STUDENT IN AN ORGANIZED HEALTH CARE EDUCATION/TRAINING PROGRAM

## 2021-11-15 PROCEDURE — G8417 CALC BMI ABV UP PARAM F/U: HCPCS | Performed by: STUDENT IN AN ORGANIZED HEALTH CARE EDUCATION/TRAINING PROGRAM

## 2021-11-15 PROCEDURE — 1090F PRES/ABSN URINE INCON ASSESS: CPT | Performed by: STUDENT IN AN ORGANIZED HEALTH CARE EDUCATION/TRAINING PROGRAM

## 2021-11-15 RX ORDER — FLUCONAZOLE 150 MG/1
150 TABLET ORAL DAILY
Qty: 3 TABLET | Refills: 0 | Status: SHIPPED | OUTPATIENT
Start: 2021-11-15 | End: 2021-11-18

## 2021-11-15 RX ORDER — AMOXICILLIN AND CLAVULANATE POTASSIUM 875; 125 MG/1; MG/1
1 TABLET, FILM COATED ORAL 2 TIMES DAILY
Qty: 14 TABLET | Refills: 0 | Status: SHIPPED | OUTPATIENT
Start: 2021-11-15 | End: 2021-11-22

## 2021-11-15 NOTE — PROGRESS NOTES
[facility] Medicine Residency Program - Virtual Visit        Alva Rivero is a 77 y.o. female evaluated via telephone on 11/15/2021. Consent:  She and/or health care decision maker is aware that that she may receive a bill for this telephone service, depending on her insurance coverage, and has provided verbal consent to proceed: Yes      Documentation:  I communicated with the patient and/or health care decision maker about Patient has had cough, congestion, runny nose for the past 7 days. Denies fevers and chills. Likely sinusitis, she has had this issue in the past, amoxil has worked, but caused yeast infection. She would like treatment for this and diflucan. Patient also needs A1c checked, will be due in 1 week. Details of this discussion including any medical advice provided:     ASSESSMENT/PLAN:    1. Other acute recurrent sinusitis  - Abx for 7 days, diflucan for yeast infection  - amoxicillin-clavulanate (AUGMENTIN) 875-125 MG per tablet; Take 1 tablet by mouth 2 times daily for 7 days  Dispense: 14 tablet; Refill: 0  - fluconazole (DIFLUCAN) 150 MG tablet; Take 1 tablet by mouth daily for 3 days  Dispense: 3 tablet; Refill: 0    2. Diabetes mellitus due to underlying condition with hyperosmolarity without coma, unspecified whether long term insulin use (HCC)    - Hemoglobin A1C; Future          Requested Prescriptions     Signed Prescriptions Disp Refills    amoxicillin-clavulanate (AUGMENTIN) 875-125 MG per tablet 14 tablet 0     Sig: Take 1 tablet by mouth 2 times daily for 7 days    fluconazole (DIFLUCAN) 150 MG tablet 3 tablet 0     Sig: Take 1 tablet by mouth daily for 3 days       There are no discontinued medications. Return in about 2 weeks (around 11/29/2021) for Diabetes. I affirm this is a Patient Initiated Episode with a Patient who has not had a related appointment within my department in the past 7 days or scheduled within the next 24 hours.     Patient identification was verified at the start of the visit: Yes    Total Time: minutes: 11-20 minutes    Note: not billable if this call serves to triage the patient into an appointment for the relevant concern      Steff Sears.  Akhil Miranda MD  Family Medicine PGY-3  11/15/21 at 2:01 PM

## 2021-11-15 NOTE — PROGRESS NOTES
Visit Information    Have you changed or started any medications since your last visit including any over-the-counter medicines, vitamins, or herbal medicines? no   Have you stopped taking any of your medications? Is so, why? -  no  Are you having any side effects from any of your medications? - no    Have you seen any other physician or provider since your last visit?  no   Have you had any other diagnostic tests since your last visit?  no   Have you been seen in the emergency room and/or had an admission in a hospital since we last saw you?  no   Have you had your routine dental cleaning in the past 6 months?  no     Do you have an active MyChart account? If no, what is the barrier?   Yes    Patient Care Team:  Jackie Valdez DO as PCP - General (Family Medicine)  Jackie Valdez DO as PCP - [facility]    Medical History Review  Past Medical, Family, and Social History reviewed and does not contribute to the patient presenting condition    Health Maintenance   Topic Date Due    Hepatitis C screen  Never done    Diabetic retinal exam  Never done    COVID-19 Vaccine (1) Never done    Colon cancer screen colonoscopy  Never done    Breast cancer screen  Never done    Shingles Vaccine (1 of 2) Never done    Low dose CT lung screening  Never done    DEXA (modify frequency per FRAX score)  Never done    Flu vaccine (1) 09/01/2021    Lipid screen  09/17/2021    Annual Wellness Visit (AWV)  10/20/2021    A1C test (Diabetic or Prediabetic)  11/25/2021    Diabetic microalbuminuria test  04/23/2022    Potassium monitoring  04/23/2022    Creatinine monitoring  08/18/2022    Diabetic foot exam  09/27/2022    Pneumococcal 65+ years Vaccine (1 of 1 - PPSV23) 10/02/2025    DTaP/Tdap/Td vaccine (2 - Td or Tdap) 10/02/2030    Hepatitis A vaccine  Aged Out    Hib vaccine  Aged Out    Meningococcal (ACWY) vaccine  Aged Out

## 2021-11-15 NOTE — PATIENT INSTRUCTIONS
Thank you for letting us take care of you today. We hope all your questions were addressed. If a question was overlooked or something else comes to mind after you return home, please contact a member of your Care Team listed below. Your Care Team at [place] [address] is Team #4  Loraine Cherry MD (Faculty)  Jose Juan Landers MD (Resident)  Abelino Zepeda MD (Resident)  Sloan Najera MD (Resident)  Smita Carbone MD (Resident)  ANNE-MARIE Rome., HIGINIO Nichols, Erick Alvarez [facility] office)  Dena [place], [address] (Clinical Practice Manager)  [facility], [address] [place] [address] (Clinical Pharmacist)       Office phone number: [phone]    If you need to get in right away due to illness, please be advised we have \"Same Day\" appointments available Monday-Friday. Please call us at [phone] option #3 to schedule your \"Same Day\" appointment.

## 2021-11-17 ENCOUNTER — TELEPHONE (OUTPATIENT)
Dept: FAMILY MEDICINE CLINIC | Age: 66
End: 2021-11-17

## 2021-11-17 ENCOUNTER — HOSPITAL ENCOUNTER (OUTPATIENT)
Age: 66
Setting detail: SPECIMEN
Discharge: HOME | End: 2021-11-17

## 2021-11-17 DIAGNOSIS — R53.83 OTHER FATIGUE: ICD-10-CM

## 2021-11-17 DIAGNOSIS — E03.9 HYPOTHYROIDISM, UNSPECIFIED TYPE: ICD-10-CM

## 2021-11-17 DIAGNOSIS — Z00.00 HEALTHCARE MAINTENANCE: ICD-10-CM

## 2021-11-17 DIAGNOSIS — Z00.00 HEALTHCARE MAINTENANCE: Primary | ICD-10-CM

## 2021-11-17 DIAGNOSIS — E08.00 DIABETES MELLITUS DUE TO UNDERLYING CONDITION WITH HYPEROSMOLARITY WITHOUT COMA, UNSPECIFIED WHETHER LONG TERM INSULIN USE (HCC): ICD-10-CM

## 2021-11-17 LAB
ABSOLUTE EOS #: 0.33 K/UL (ref 0–0.44)
ABSOLUTE IMMATURE GRANULOCYTE: 0.03 K/UL (ref 0–0.3)
ABSOLUTE LYMPH #: 3.3 K/UL (ref 1.1–3.7)
ABSOLUTE MONO #: 0.75 K/UL (ref 0.1–1.2)
ANION GAP SERPL CALCULATED.3IONS-SCNC: 16 MMOL/L (ref 9–17)
BASOPHILS # BLD: 1 % (ref 0–2)
BASOPHILS ABSOLUTE: 0.1 K/UL (ref 0–0.2)
BUN BLDV-MCNC: 14 MG/DL (ref 8–23)
BUN/CREAT BLD: ABNORMAL (ref 9–20)
CALCIUM SERPL-MCNC: 9.4 MG/DL (ref 8.6–10.4)
CHLORIDE BLD-SCNC: 102 MMOL/L (ref 98–107)
CHOLESTEROL/HDL RATIO: 3.8
CHOLESTEROL: 145 MG/DL
CO2: 21 MMOL/L (ref 20–31)
CREAT SERPL-MCNC: 0.53 MG/DL (ref 0.5–0.9)
DIFFERENTIAL TYPE: NORMAL
EOSINOPHILS RELATIVE PERCENT: 3 % (ref 1–4)
ESTIMATED AVERAGE GLUCOSE: 180 MG/DL
GFR AFRICAN AMERICAN: >60 ML/MIN
GFR NON-AFRICAN AMERICAN: >60 ML/MIN
GFR SERPL CREATININE-BSD FRML MDRD: ABNORMAL ML/MIN/{1.73_M2}
GFR SERPL CREATININE-BSD FRML MDRD: ABNORMAL ML/MIN/{1.73_M2}
GLUCOSE BLD-MCNC: 273 MG/DL (ref 70–99)
HBA1C MFR BLD: 7.9 % (ref 4–6)
HCT VFR BLD CALC: 43.8 % (ref 36.3–47.1)
HDLC SERPL-MCNC: 38 MG/DL
HEMOGLOBIN: 13.3 G/DL (ref 11.9–15.1)
HEPATITIS C ANTIBODY: NONREACTIVE
HIV AG/AB: NONREACTIVE
IMMATURE GRANULOCYTES: 0 %
LDL CHOLESTEROL: 62 MG/DL (ref 0–130)
LYMPHOCYTES # BLD: 30 % (ref 24–43)
MCH RBC QN AUTO: 27.3 PG (ref 25.2–33.5)
MCHC RBC AUTO-ENTMCNC: 30.4 G/DL (ref 28.4–34.8)
MCV RBC AUTO: 89.9 FL (ref 82.6–102.9)
MONOCYTES # BLD: 7 % (ref 3–12)
NRBC AUTOMATED: 0 PER 100 WBC
PDW BLD-RTO: 13.9 % (ref 11.8–14.4)
PLATELET # BLD: 390 K/UL (ref 138–453)
PLATELET ESTIMATE: NORMAL
PMV BLD AUTO: 10.2 FL (ref 8.1–13.5)
POTASSIUM SERPL-SCNC: 4.2 MMOL/L (ref 3.7–5.3)
RBC # BLD: 4.87 M/UL (ref 3.95–5.11)
RBC # BLD: NORMAL 10*6/UL
SEG NEUTROPHILS: 59 % (ref 36–65)
SEGMENTED NEUTROPHILS ABSOLUTE COUNT: 6.6 K/UL (ref 1.5–8.1)
SODIUM BLD-SCNC: 139 MMOL/L (ref 135–144)
TRIGL SERPL-MCNC: 223 MG/DL
TSH SERPL DL<=0.05 MIU/L-ACNC: 2.39 MIU/L (ref 0.3–5)
VLDLC SERPL CALC-MCNC: ABNORMAL MG/DL (ref 1–30)
WBC # BLD: 11.1 K/UL (ref 3.5–11.3)
WBC # BLD: NORMAL 10*3/UL

## 2021-11-17 NOTE — PROGRESS NOTES
Attending Physician Statement    Wt Readings from Last 3 Encounters:   09/27/21 165 lb 9.6 oz (75.1 kg)   08/25/21 166 lb (75.3 kg)   08/17/21 185 lb (83.9 kg)     Temp Readings from Last 3 Encounters:   08/25/21 96.4 °F (35.8 °C) (Temporal)   08/17/21 96.4 °F (35.8 °C) (Temporal)   05/12/21 97.3 °F (36.3 °C) (Temporal)     BP Readings from Last 3 Encounters:   09/27/21 128/86   08/25/21 135/88   08/17/21 (!) 153/94     Pulse Readings from Last 3 Encounters:   09/27/21 75   08/25/21 75   08/17/21 82         I have discussed the care of Joseline Owens, including pertinent history and exam findings with the resident. I have reviewed the key elements of all parts of the encounter with the resident. I agree with the assessment, plan and orders as documented by the resident.   (GE Modifier)

## 2021-11-18 LAB
ESTIMATED AVERAGE GLUCOSE: 180 MG/DL
HBA1C MFR BLD: 7.9 % (ref 4–6)

## 2021-11-29 ENCOUNTER — HOSPITAL ENCOUNTER (OUTPATIENT)
Dept: DIABETES SERVICES | Age: 66
Setting detail: THERAPIES SERIES
Discharge: HOME | End: 2021-11-29
Payer: MEDICARE

## 2021-11-29 DIAGNOSIS — E08.00 DIABETES MELLITUS DUE TO UNDERLYING CONDITION WITH HYPEROSMOLARITY WITHOUT NONKETOTIC HYPERGLYCEMIC-HYPEROSMOLAR COMA (NKHHC) (HCC): Primary | ICD-10-CM

## 2021-11-29 PROCEDURE — G0108 DIAB MANAGE TRN  PER INDIV: HCPCS
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
## 2021-12-02 ENCOUNTER — OFFICE VISIT (OUTPATIENT)
Dept: FAMILY MEDICINE CLINIC | Age: 66
End: 2021-12-02
Payer: MEDICARE

## 2021-12-02 VITALS
BODY MASS INDEX: 31.54 KG/M2 | DIASTOLIC BLOOD PRESSURE: 93 MMHG | SYSTOLIC BLOOD PRESSURE: 147 MMHG | HEIGHT: 62 IN | HEART RATE: 75 BPM | TEMPERATURE: 96.4 F | WEIGHT: 171.4 LBS

## 2021-12-02 DIAGNOSIS — E11.9 TYPE 2 DIABETES MELLITUS WITHOUT COMPLICATION, WITHOUT LONG-TERM CURRENT USE OF INSULIN (HCC): Primary | ICD-10-CM

## 2021-12-02 DIAGNOSIS — Z12.11 SCREEN FOR COLON CANCER: ICD-10-CM

## 2021-12-02 DIAGNOSIS — Z12.31 SCREENING MAMMOGRAM FOR BREAST CANCER: ICD-10-CM

## 2021-12-02 DIAGNOSIS — Z78.0 POST-MENOPAUSAL: ICD-10-CM

## 2021-12-02 PROCEDURE — 2022F DILAT RTA XM EVC RTNOPTHY: CPT | Performed by: STUDENT IN AN ORGANIZED HEALTH CARE EDUCATION/TRAINING PROGRAM

## 2021-12-02 PROCEDURE — G8417 CALC BMI ABV UP PARAM F/U: HCPCS | Performed by: STUDENT IN AN ORGANIZED HEALTH CARE EDUCATION/TRAINING PROGRAM

## 2021-12-02 PROCEDURE — 3017F COLORECTAL CA SCREEN DOC REV: CPT | Performed by: STUDENT IN AN ORGANIZED HEALTH CARE EDUCATION/TRAINING PROGRAM

## 2021-12-02 PROCEDURE — G8484 FLU IMMUNIZE NO ADMIN: HCPCS | Performed by: STUDENT IN AN ORGANIZED HEALTH CARE EDUCATION/TRAINING PROGRAM

## 2021-12-02 PROCEDURE — 1123F ACP DISCUSS/DSCN MKR DOCD: CPT | Performed by: STUDENT IN AN ORGANIZED HEALTH CARE EDUCATION/TRAINING PROGRAM

## 2021-12-02 PROCEDURE — G8400 PT W/DXA NO RESULTS DOC: HCPCS | Performed by: STUDENT IN AN ORGANIZED HEALTH CARE EDUCATION/TRAINING PROGRAM

## 2021-12-02 PROCEDURE — 99213 OFFICE O/P EST LOW 20 MIN: CPT | Performed by: STUDENT IN AN ORGANIZED HEALTH CARE EDUCATION/TRAINING PROGRAM

## 2021-12-02 PROCEDURE — G8427 DOCREV CUR MEDS BY ELIG CLIN: HCPCS | Performed by: STUDENT IN AN ORGANIZED HEALTH CARE EDUCATION/TRAINING PROGRAM

## 2021-12-02 PROCEDURE — 1090F PRES/ABSN URINE INCON ASSESS: CPT | Performed by: STUDENT IN AN ORGANIZED HEALTH CARE EDUCATION/TRAINING PROGRAM

## 2021-12-02 PROCEDURE — 4004F PT TOBACCO SCREEN RCVD TLK: CPT | Performed by: STUDENT IN AN ORGANIZED HEALTH CARE EDUCATION/TRAINING PROGRAM

## 2021-12-02 PROCEDURE — 3051F HG A1C>EQUAL 7.0%<8.0%: CPT | Performed by: STUDENT IN AN ORGANIZED HEALTH CARE EDUCATION/TRAINING PROGRAM

## 2021-12-02 PROCEDURE — 4040F PNEUMOC VAC/ADMIN/RCVD: CPT | Performed by: STUDENT IN AN ORGANIZED HEALTH CARE EDUCATION/TRAINING PROGRAM

## 2021-12-02 RX ORDER — GLIPIZIDE 5 MG/1
2.5 TABLET ORAL
Qty: 60 TABLET | Refills: 3 | Status: SHIPPED | OUTPATIENT
Start: 2021-12-02 | End: 2022-01-03 | Stop reason: SDUPTHER

## 2021-12-02 RX ORDER — DULAGLUTIDE 0.75 MG/.5ML
0.75 INJECTION, SOLUTION SUBCUTANEOUS WEEKLY
Qty: 1 PEN | Refills: 1 | Status: SHIPPED | OUTPATIENT
Start: 2021-12-02 | End: 2022-01-03

## 2021-12-02 ASSESSMENT — ENCOUNTER SYMPTOMS
VOMITING: 0
DIARRHEA: 0
SORE THROAT: 0
SHORTNESS OF BREATH: 0
RHINORRHEA: 0
WHEEZING: 0
ABDOMINAL PAIN: 0
NAUSEA: 0
COUGH: 0

## 2021-12-02 NOTE — PROGRESS NOTES
Attending Physician Statement  I have discussed the care of Elizabeth Tidwell 77 y.o. female, including pertinent history and exam findings, with the resident Dr. Irma Shah MD.    History and Exam:   Chief Complaint   Patient presents with    Diabetes     1 month f/u    Medication Check     Glipizide       No past medical history on file. Allergies   Allergen Reactions    Sulfamethoxazole-Trimethoprim Rash      I have seen and examined the patient and the key elements of the encounter have been performed by me. BP Readings from Last 3 Encounters:   12/02/21 (!) 147/93   09/27/21 128/86   08/25/21 135/88     BP (!) 147/93 (Site: Left Upper Arm, Position: Sitting, Cuff Size: Medium Adult)   Pulse 75   Temp 96.4 °F (35.8 °C) (Temporal)   Ht 5' 2.01\" (1.575 m)   Wt 171 lb 6.4 oz (77.7 kg)   BMI 31.34 kg/m²   Lab Results   Component Value Date    WBC 11.1 11/17/2021    HGB 13.3 11/17/2021    HCT 43.8 11/17/2021     11/17/2021    CHOL 145 11/17/2021    TRIG 223 (H) 11/17/2021    HDL 38 (L) 11/17/2021    LDLDIRECT 57 09/17/2020     11/17/2021    K 4.2 11/17/2021     11/17/2021    CREATININE 0.53 11/17/2021    BUN 14 11/17/2021    CO2 21 11/17/2021    TSH 2.39 11/17/2021    LABA1C 7.9 (H) 11/17/2021    LABA1C 7.9 (H) 11/17/2021    LABMICR 83 (H) 04/23/2021     No results found for: LABPROT, LABALBU  No results found for: IRON, TIBC, FERRITIN  Lab Results   Component Value Date    LDLCHOLESTEROL 62 11/17/2021    LDLDIRECT 57 09/17/2020     I agree with the assessment, plan and the diagnosis of    Diagnosis Orders   1. Type 2 diabetes mellitus without complication, without long-term current use of insulin (HCC)  glipiZIDE (GLUCOTROL) 5 MG tablet    Dulaglutide (TRULICITY) 5.69 DW/3.6UT Located within [facility] Care Pharmacist   2. Screening mammogram for breast cancer  [facility] DIGITAL SCREEN W OR WO CAD BILATERAL   3.  Post-menopausal  DEXA BONE DENSITY AXIAL SKELETON   4. Screen for colon cancer [facility] Screening Colonoscopy    . I agree with orders as documented by the resident. More than 25 minutes spent  in face to face encounter with the patient and more than half in counseling. Patient's questions were answered. Patient Voiced understanding to the counseling. Return in about 4 weeks (around 12/30/2021) for Diabetes.    (GC Modifier)-Dr. Teddie Schirmer, MD

## 2021-12-02 NOTE — PATIENT INSTRUCTIONS
Thank you for letting us take care of you today. We hope all your questions were addressed. If a question was overlooked or something else comes to mind after you return home, please contact a member of your Care Team listed below. Your Care Team at [place] [address] is Team #2  Gadiel Moreno DO (Faculty)  Naheed Cronin (Faculty)  Atiya Pinon MD (Resident)  Chrissy Diaz MD (Resident)  Mandi Watson MD (Resident)  Elen Watts MD (Resident)  Rosaline Gómez., ANNE-MARIE Colvin.,  THOMAS Valdes., [facility] office)  Chika Hogue, [address] (Clinical Practice Manager)  Rajat Yoo, [address] [facility] (Clinical Pharmacist)     Office phone number: [phone]    If you need to get in right away due to illness, please be advised we have \"Same Day\" appointments available Monday-Friday. Please call us at [phone] option #3 to schedule your \"Same Day\" appointment. Patient Education        dulaglutide  Pronunciation:  DOO la GLOO tide  Brand:  Trulicity Pen  What is the most important information I should know about dulaglutide? You should not use dulaglutide if you have Multiple Endocrine Neoplasia syndrome type 2 (MEN 2), or a personal or family history of medullary thyroid carcinoma (a type of thyroid cancer). Do not use dulaglutide if you are in a state of diabetic ketoacidosis (call your doctor for treatment). In animal studies, dulaglutide caused thyroid tumors or thyroid cancer. It is not known whether these effects would occur in people using regular doses. Ask your doctor about your risk. Call your doctor at once if you have signs of a thyroid tumor, such as swelling or a lump in your neck, trouble swallowing, a hoarse voice, or shortness of breath. What is dulaglutide? Dulaglutide is used together with diet and exercise to improve blood sugar control in adults with type 2 diabetes mellitus.   Dulaglutide is also used to help reduce the risk of serious heart problems such as heart attack or stroke in adults who have type 2 diabetes and heart disease. This medicine is not for treating type 1 diabetes. Dulaglutide may also be used for purposes not listed in this medication guide. What should I discuss with my healthcare provider before using dulaglutide? You should not use dulaglutide if you are allergic to it, or if you have:  · multiple endocrine neoplasia type 2 (tumors in your glands);  · a personal or family history of medullary thyroid carcinoma (a type of thyroid cancer); or  · diabetic ketoacidosis (call your doctor for treatment). Tell your doctor if you have ever had:  · pancreatitis;  · a stomach or intestinal disorder;  · gastroesophageal reflux disease (GERD) or slow digestion;  · eye problems caused by diabetes (retinopathy);  · liver or kidney disease;  · if you also use insulin or oral diabetes medicine; or  · if you have been sick with vomiting or diarrhea. In animal studies, dulaglutide caused thyroid tumors or thyroid cancer. It is not known whether these effects would occur in people using regular doses. Ask your doctor about your risk. It is not known whether this medicine will harm an unborn baby. Tell your doctor if you are pregnant or plan to become pregnant. It may not be safe to breast-feed while using this medicine. Ask your doctor about any risk. Dulaglutide is not approved for use by anyone younger than 25years old. How should I use dulaglutide? Follow all directions on your prescription label and read all medication guides or instruction sheets. Your doctor may occasionally change your dose. Use the medicine exactly as directed. Dulaglutide is injected under the skin once per week. Use dulaglutide on the same day each week at the same time of day. If you change your dosing day, allow at least 3 days to pass between doses. You may use dulaglutide with or without food. Read and carefully follow any Instructions for Use provided with your medicine.  Ask your doctor or pharmacist if you do not understand these instructions. Your healthcare provider will show you where on your body to inject dulaglutide. Use a different place each time you give an injection. Do not inject into the same place two times in a row. You may have low blood sugar (hypoglycemia) and feel very hungry, dizzy, irritable, confused, anxious, or shaky. To quickly treat hypoglycemia, eat or drink a fast-acting source of sugar (fruit juice, hard candy, crackers, raisins, or non-diet soda). Your doctor may prescribe a glucagon injection kit in case you have severe hypoglycemia. Be sure your family or close friends know how to give you this injection in an emergency. Also watch for signs of high blood sugar (hyperglycemia) such as increased thirst or urination. Blood sugar levels can be affected by stress, illness, surgery, exercise, alcohol use, or skipping meals. Ask your doctor before changing your dose or medication schedule. Each injection pen or prefilled syringe is for one use only. Throw away after one use, even if there is still medicine left inside. Use a puncture-proof \"sharps\" container. Follow state or local laws about how to dispose of this container. Keep it out of the reach of children and pets. Store dulaglutide in the refrigerator, protected from light. Do not use past the expiration date on the medicine label. Do not freeze dulaglutide, and throw away the medicine if it has become frozen. You may also store dulaglutide at room temperature for up to 14 days before use. What happens if I miss a dose? Use the medicine as soon as you can, but skip the missed dose if your next dose is due in less than 3 days. Do not use two doses at one time. Do not use this medicine twice within a 72-hour period. What happens if I overdose? Seek emergency medical attention or call the Poison Help line at 1-416.319.9215. What should I avoid while using dulaglutide?   Never share an injection pen children, never share your medicines with others, and use this medication only for the indication prescribed. Every effort has been made to ensure that the information provided by [address] is accurate, up-to-date, and complete, but no guarantee is made to that effect. Drug information contained herein may be time sensitive. [facility] information has been compiled for use by healthcare practitioners and consumers in the [place] and therefore [facility] does not warrant that uses outside of the [place] are appropriate, unless specifically indicated otherwise. [facility]'s drug information does not endorse drugs, diagnose patients or recommend therapy. [facility]'s drug information is an informational resource designed to assist licensed healthcare practitioners in caring for their patients and/or to serve consumers viewing this service as a supplement to, and not a substitute for, the expertise, skill, knowledge and judgment of healthcare practitioners. The absence of a warning for a given drug or drug combination in no way should be construed to indicate that the drug or drug combination is safe, effective or appropriate for any given patient. [facility] does not assume any responsibility for any aspect of healthcare administered with the aid of information [facility] provides. The information contained herein is not intended to cover all possible uses, directions, precautions, warnings, drug interactions, allergic reactions, or adverse effects. If you have questions about the drugs you are taking, check with your doctor, nurse or pharmacist.  Copyright 0149-9234 [address]: 4.02. Revision date: 10/8/2020. Care instructions adapted under license by [facility] ([facility]). If you have questions about a medical condition or this instruction, always ask your healthcare professional. [place] [address] any warranty or liability for your use of this information.      Thank you for letting us take care of you today. We hope all your questions were addressed. If a question was overlooked or something else comes to mind after you return home, please contact a member of your Care Team listed below. Your Care Team at [place] [address] is Team #2  Michelle Burnette DO (Faculty)  Johnna Pham (Faculty)  Kim Dias MD (Resident)  Shan Desai MD (Resident)  Sander Hagan MD (Resident)  Brittni Steele MD (Resident)  Redd Carrasco., ANNE-MARIE Sparks.,  THOMAS Perez., Juliann Ellis [facility] office)  Beth Goode, [address] [facility] [address] (Clinical Practice Manager)  Armida Aguilera [facility] (Clinical Pharmacist)     Office phone number: [phone]    If you need to get in right away due to illness, please be advised we have \"Same Day\" appointments available Monday-Friday. Please call us at [phone] option #3 to schedule your \"Same Day\" appointment.

## 2021-12-02 NOTE — PROGRESS NOTES
Diabetic visit information    BP Readings from Last 3 Encounters:   09/27/21 128/86   08/25/21 135/88   08/17/21 (!) 153/94       Hemoglobin A1C (%)   Date Value   11/17/2021 7.9 (H)   11/17/2021 7.9 (H)   08/25/2021 9.6     Microalb/Crt. Ratio (mcg/mg creat)   Date Value   04/23/2021 83 (H)     LDL Cholesterol (mg/dL)   Date Value   11/17/2021 62               Have you changed or started any medications since your last visit including any over-the-counter medicines, vitamins, or herbal medicines? no   Have you stopped taking any of your medications? Is so, why? -  no  Are you having any side effects from any of your medications? - no    Have you seen any other physician or provider since your last visit?  no   Have you had any other diagnostic tests since your last visit? yes - labs   Have you been seen in the emergency room and/or had an admission in a hospital since we last saw you?  no     Have you had your annual diabetic retinal (eye) exam? No   (ensure copy of exam is in the chart)    Have you had your routine dental cleaning in the past 6 months? no    Do you have an active MyChart account? If not, what are your barriers? Yes    Patient Care Team:  Keisha Montague DO as PCP - General (Family Medicine)  Keisha Montague DO as PCP - [facility] Provider    Medical history Review  Past Medical, Family, and Social History reviewed and does contribute to the patient presenting condition.     Health Maintenance   Topic Date Due    Diabetic retinal exam  Never done    COVID-19 Vaccine (1) Never done    Colon cancer screen colonoscopy  Never done    Breast cancer screen  Never done    Shingles Vaccine (1 of 2) Never done    Low dose CT lung screening  Never done    DEXA (modify frequency per FRAX score)  Never done    Flu vaccine (1) 09/01/2021    Annual Wellness Visit (AWV)  10/20/2021    Diabetic microalbuminuria test  04/23/2022    Diabetic foot exam  09/27/2022    A1C test (Diabetic or Prediabetic)  11/17/2022    Lipid screen  11/17/2022    Potassium monitoring  11/17/2022    Creatinine monitoring  11/17/2022    Pneumococcal 65+ years Vaccine (1 of 1 - PPSV23) 10/02/2025    DTaP/Tdap/Td vaccine (2 - Td or Tdap) 10/02/2030    Hepatitis C screen  Completed    Hepatitis A vaccine  Aged Out    Hib vaccine  Aged Out    Meningococcal (ACWY) vaccine  Aged Out

## 2021-12-02 NOTE — PROGRESS NOTES
[address] [facility] Residency Program - Outpatient Note      Joseline Owens is a 77 y.o. female Established patient, presented to the office today for:  Chief Complaint   Patient presents with    Diabetes     1 month f/u    Medication Check     Glipizide         ASSESSMENT/PLAN:    1. Type 2 diabetes mellitus without complication, without long-term current use of insulin (HCC)  - decrease glipizide to 2.5mg BID, will start trulicity. - glipiZIDE (GLUCOTROL) 5 MG tablet; Take 0.5 tablets by mouth 2 times daily (before meals)  Dispense: 60 tablet; Refill: 3  - Dulaglutide (TRULICITY) 5.48 MR/6.9RS SOPN; Inject 0.75 mg into the skin once a week  Dispense: 1 pen; Refill: 1  - [address] [place] [address] Primary Care Pharmacist    2. Screening mammogram for breast cancer  - History of benign breast mass  - [facility] DIGITAL SCREEN W OR WO CAD BILATERAL; Future    3. Post-menopausal  - DEXA BONE DENSITY AXIAL SKELETON; Future    4. Screen for colon cancer  - Mother had colon cancer  - [facility] Screening Colonoscopy          Requested Prescriptions     Signed Prescriptions Disp Refills    glipiZIDE (GLUCOTROL) 5 MG tablet 60 tablet 3     Sig: Take 0.5 tablets by mouth 2 times daily (before meals)    Dulaglutide (TRULICITY) 2.00 OH/5.4JZ SOPN 1 pen 1     Sig: Inject 0.75 mg into the skin once a week       Medications Discontinued During This Encounter   Medication Reason    glipiZIDE (GLUCOTROL) 5 MG tablet        Return in about 4 weeks (around 12/30/2021) for Diabetes. SUBJECTIVE/OBJECTIVE:      Joseline Owens is a 77 y.o. female,with  has no past medical history on file. HPI    Pt is on glipizide and metformin for T2DM, patient has low sugars in the 60s when she takes glipizide and she is concerned. She does have a history of falls, though not recently. Pt due for health maintenance. Last mammogram was done at [place], benign mass in right breast. Will repeat.      Review of Systems   Constitutional: Positive for fatigue (When glucose is low). Negative for chills, diaphoresis and fever. HENT: Negative for congestion, rhinorrhea and sore throat. Respiratory: Negative for cough, shortness of breath and wheezing. Cardiovascular: Negative for chest pain, palpitations and leg swelling. Gastrointestinal: Negative for abdominal pain, diarrhea, nausea and vomiting. Genitourinary: Negative for difficulty urinating and dysuria. Neurological: Positive for light-headedness (when sugars are low). Negative for dizziness and headaches. The patient has a No family history on file. BP (!) 147/93 (Site: Left Upper Arm, Position: Sitting, Cuff Size: Medium Adult)   Pulse 75   Temp 96.4 °F (35.8 °C) (Temporal)   Ht 5' 2.01\" (1.575 m)   Wt 171 lb 6.4 oz (77.7 kg)   BMI 31.34 kg/m²    BP Readings from Last 3 Encounters:   12/02/21 (!) 147/93   09/27/21 128/86   08/25/21 135/88       Physical Exam  Vitals reviewed. Constitutional:       General: She is not in acute distress. Eyes:      Extraocular Movements: Extraocular movements intact. Conjunctiva/sclera: Conjunctivae normal.   Cardiovascular:      Rate and Rhythm: Normal rate and regular rhythm. Pulses: Normal pulses. Heart sounds: Normal heart sounds. Pulmonary:      Effort: Pulmonary effort is normal.      Breath sounds: Normal breath sounds. Abdominal:      General: Bowel sounds are normal. There is no distension. Palpations: Abdomen is soft. Tenderness: There is no abdominal tenderness. There is no guarding. Musculoskeletal:      Right lower leg: No edema. Left lower leg: No edema. Neurological:      General: No focal deficit present. Mental Status: She is alert.          Lab Results   Component Value Date    WBC 11.1 11/17/2021    HGB 13.3 11/17/2021    HCT 43.8 11/17/2021     11/17/2021    CHOL 145 11/17/2021    TRIG 223 (H) 11/17/2021    HDL 38 (L) 11/17/2021    LDLDIRECT 57 09/17/2020     11/17/2021    K 4.2 11/17/2021     11/17/2021    CREATININE 0.53 11/17/2021    BUN 14 11/17/2021    CO2 21 11/17/2021    TSH 2.39 11/17/2021    LABA1C 7.9 (H) 11/17/2021    LABA1C 7.9 (H) 11/17/2021    LABMICR 83 (H) 04/23/2021     Lab Results   Component Value Date    CALCIUM 9.4 11/17/2021     Lab Results   Component Value Date    LDLCHOLESTEROL 62 11/17/2021    LDLDIRECT 57 09/17/2020          All patient questions answered. Pt voiced understanding. James Perez MD  Family Medicine PGY-3  12/02/21 at 4:14 PM      Disclaimer: Some orall of this note was transcribed using voice-recognition software. This may cause typographical errors occasionally. Although all effort is made to fix these errors, please do not hesitate to contact our office if there Everrett Beckyter concern with the understanding of this note. An electronic signature was used to authenticate this note.

## 2021-12-03 ENCOUNTER — TELEPHONE (OUTPATIENT)
Dept: SURGERY | Age: 66
End: 2021-12-03

## 2021-12-08 ENCOUNTER — TELEPHONE (OUTPATIENT)
Dept: SURGERY | Age: 66
End: 2021-12-08

## 2021-12-15 ENCOUNTER — TELEPHONE (OUTPATIENT)
Dept: SURGERY | Age: 66
End: 2021-12-15

## 2022-01-03 ENCOUNTER — OFFICE VISIT (OUTPATIENT)
Dept: FAMILY MEDICINE CLINIC | Age: 67
End: 2022-01-03
Payer: MEDICARE

## 2022-01-03 VITALS
HEART RATE: 82 BPM | WEIGHT: 171 LBS | BODY MASS INDEX: 31.27 KG/M2 | SYSTOLIC BLOOD PRESSURE: 152 MMHG | DIASTOLIC BLOOD PRESSURE: 86 MMHG

## 2022-01-03 DIAGNOSIS — E78.2 MIXED HYPERLIPIDEMIA: ICD-10-CM

## 2022-01-03 DIAGNOSIS — E06.3 HYPOTHYROIDISM DUE TO HASHIMOTO'S THYROIDITIS: ICD-10-CM

## 2022-01-03 DIAGNOSIS — I10 ESSENTIAL HYPERTENSION: ICD-10-CM

## 2022-01-03 DIAGNOSIS — F33.1 MAJOR DEPRESSIVE DISORDER, RECURRENT, MODERATE (HCC): ICD-10-CM

## 2022-01-03 DIAGNOSIS — F33.0 MAJOR DEPRESSIVE DISORDER, RECURRENT, MILD (HCC): ICD-10-CM

## 2022-01-03 DIAGNOSIS — E08.00 DIABETES MELLITUS DUE TO UNDERLYING CONDITION WITH HYPEROSMOLARITY WITHOUT COMA, UNSPECIFIED WHETHER LONG TERM INSULIN USE (HCC): ICD-10-CM

## 2022-01-03 DIAGNOSIS — F33.1 MODERATE EPISODE OF RECURRENT MAJOR DEPRESSIVE DISORDER (HCC): ICD-10-CM

## 2022-01-03 DIAGNOSIS — F32.A DEPRESSION, UNSPECIFIED DEPRESSION TYPE: ICD-10-CM

## 2022-01-03 DIAGNOSIS — E03.8 HYPOTHYROIDISM DUE TO HASHIMOTO'S THYROIDITIS: ICD-10-CM

## 2022-01-03 DIAGNOSIS — E11.9 TYPE 2 DIABETES MELLITUS WITHOUT COMPLICATION, WITHOUT LONG-TERM CURRENT USE OF INSULIN (HCC): Primary | ICD-10-CM

## 2022-01-03 PROBLEM — F33.9 MAJOR DEPRESSIVE DISORDER, RECURRENT, UNSPECIFIED (HCC): Status: ACTIVE | Noted: 2022-01-03

## 2022-01-03 LAB — HBA1C MFR BLD: 7.1 %

## 2022-01-03 PROCEDURE — 99213 OFFICE O/P EST LOW 20 MIN: CPT | Performed by: FAMILY MEDICINE

## 2022-01-03 PROCEDURE — 3051F HG A1C>EQUAL 7.0%<8.0%: CPT | Performed by: FAMILY MEDICINE

## 2022-01-03 PROCEDURE — 83036 HEMOGLOBIN GLYCOSYLATED A1C: CPT | Performed by: FAMILY MEDICINE

## 2022-01-03 RX ORDER — GLIPIZIDE 5 MG/1
2.5 TABLET ORAL
Qty: 30 TABLET | Refills: 5 | Status: SHIPPED | OUTPATIENT
Start: 2022-01-03 | End: 2022-10-25

## 2022-01-03 RX ORDER — LANCETS 30 GAUGE
1 EACH MISCELLANEOUS DAILY
Qty: 100 EACH | Refills: 0 | Status: SHIPPED | OUTPATIENT
Start: 2022-01-03 | End: 2022-01-28 | Stop reason: SDUPTHER

## 2022-01-03 RX ORDER — AMLODIPINE BESYLATE 5 MG/1
5 TABLET ORAL DAILY
Qty: 30 TABLET | Refills: 11 | Status: SHIPPED | OUTPATIENT
Start: 2022-01-03 | End: 2022-01-28 | Stop reason: SDUPTHER

## 2022-01-03 RX ORDER — ATORVASTATIN CALCIUM 20 MG/1
20 TABLET, FILM COATED ORAL DAILY
Qty: 30 TABLET | Refills: 11 | Status: SHIPPED | OUTPATIENT
Start: 2022-01-03 | End: 2022-11-04

## 2022-01-03 RX ORDER — LISINOPRIL 20 MG/1
20 TABLET ORAL DAILY
Qty: 30 TABLET | Refills: 11 | Status: SHIPPED | OUTPATIENT
Start: 2022-01-03

## 2022-01-03 RX ORDER — CITALOPRAM 40 MG/1
40 TABLET ORAL DAILY
Qty: 30 TABLET | Refills: 11 | Status: SHIPPED | OUTPATIENT
Start: 2022-01-03 | End: 2022-12-29

## 2022-01-03 RX ORDER — BLOOD SUGAR DIAGNOSTIC
1 STRIP MISCELLANEOUS DAILY
Qty: 100 EACH | Refills: 3 | Status: SHIPPED | OUTPATIENT
Start: 2022-01-03

## 2022-01-03 RX ORDER — LEVOTHYROXINE SODIUM 0.1 MG/1
100 TABLET ORAL DAILY
Qty: 30 TABLET | Refills: 11 | Status: SHIPPED | OUTPATIENT
Start: 2022-01-03 | End: 2022-11-04

## 2022-01-03 ASSESSMENT — PATIENT HEALTH QUESTIONNAIRE - PHQ9
3. TROUBLE FALLING OR STAYING ASLEEP: 0
9. THOUGHTS THAT YOU WOULD BE BETTER OFF DEAD, OR OF HURTING YOURSELF: 0
SUM OF ALL RESPONSES TO PHQ QUESTIONS 1-9: 0
8. MOVING OR SPEAKING SO SLOWLY THAT OTHER PEOPLE COULD HAVE NOTICED. OR THE OPPOSITE, BEING SO FIGETY OR RESTLESS THAT YOU HAVE BEEN MOVING AROUND A LOT MORE THAN USUAL: 0
SUM OF ALL RESPONSES TO PHQ9 QUESTIONS 1 & 2: 0
4. FEELING TIRED OR HAVING LITTLE ENERGY: 0
7. TROUBLE CONCENTRATING ON THINGS, SUCH AS READING THE NEWSPAPER OR WATCHING TELEVISION: 0
5. POOR APPETITE OR OVEREATING: 0
SUM OF ALL RESPONSES TO PHQ QUESTIONS 1-9: 0
1. LITTLE INTEREST OR PLEASURE IN DOING THINGS: 0
10. IF YOU CHECKED OFF ANY PROBLEMS, HOW DIFFICULT HAVE THESE PROBLEMS MADE IT FOR YOU TO DO YOUR WORK, TAKE CARE OF THINGS AT HOME, OR GET ALONG WITH OTHER PEOPLE: 0
SUM OF ALL RESPONSES TO PHQ QUESTIONS 1-9: 0
SUM OF ALL RESPONSES TO PHQ QUESTIONS 1-9: 0
2. FEELING DOWN, DEPRESSED OR HOPELESS: 0
6. FEELING BAD ABOUT YOURSELF - OR THAT YOU ARE A FAILURE OR HAVE LET YOURSELF OR YOUR FAMILY DOWN: 0

## 2022-01-03 NOTE — PROGRESS NOTES
Diabetes    Ck up  Was ill last month  Med refills - updates     BP (!) 152/86   Pulse 82   Wt 171 lb (77.6 kg)   BMI 31.27 kg/m²       Review of Systems    Negative for:     Worry / mood complaints  Headache  Dizziness  Visual Disturbance  Hearing Changes  Nasal / sinus Symptoms  Mouth / tooth symptom, pain  Throat pain  Difficulty swallowing  Neck pain  Chest discomfort  Cough  SOB  Abdominal area discomfort / pain  N/V/D/C  Pelvic area discomfort  Bladder / voiding discomfort  Bowel complaints  MS complaints   Numbness/tingling/abnormal sensations   Edema / Leg swelling  Dizziness  Fatigue  Bleeding   Skin    Pertinent Pos: See HPI -     Physical Exam    HRRR  LCTAB  No PTE    ASSESSMENT AND PLAN      Diagnosis Orders   1. Type 2 diabetes mellitus without complication, without long-term current use of insulin (HCC)  POCT glycosylated hemoglobin (Hb A1C)    glipiZIDE (GLUCOTROL) 5 MG tablet    blood glucose test strips (EXACTECH TEST) strip   2. Depression, unspecified depression type  citalopram (CELEXA) 40 MG tablet   3. Hypothyroidism due to Hashimoto's thyroiditis  levothyroxine (SYNTHROID) 100 MCG tablet   4. Mixed hyperlipidemia  atorvastatin (LIPITOR) 20 MG tablet   5. Essential hypertension  lisinopril (PRINIVIL;ZESTRIL) 20 MG tablet    amLODIPine (NORVASC) 5 MG tablet   6. Diabetes mellitus due to underlying condition with hyperosmolarity without coma, unspecified whether long term insulin use (HCC)  metFORMIN (GLUCOPHAGE) 1000 MG tablet    Lancets MISC   7. Major depressive disorder, recurrent, mild     8. Major depressive disorder, recurrent, moderate     9. Moderate episode of recurrent major depressive disorder (HCC)           A1c - 7.1 POCT today.   Unable to use   HM - patient states she is deferring these at this time due to financial considerations  Diabetic classes reportedly extremely helpful - Starting Fresh     Labs - UTD    Mag 6 m    Electronicallysigned by Taj Burrell DO on 1/3/2022 at 4:07 PM

## 2022-01-07 ENCOUNTER — TELEPHONE (OUTPATIENT)
Dept: FAMILY MEDICINE CLINIC | Age: 67
End: 2022-01-07

## 2022-01-07 DIAGNOSIS — J21.8 ACUTE BRONCHIOLITIS DUE TO OTHER SPECIFIED ORGANISMS: Primary | ICD-10-CM

## 2022-01-07 RX ORDER — AZITHROMYCIN 250 MG/1
250 TABLET, FILM COATED ORAL SEE ADMIN INSTRUCTIONS
Qty: 6 TABLET | Refills: 0 | Status: SHIPPED | OUTPATIENT
Start: 2022-01-07 | End: 2022-01-12

## 2022-01-07 RX ORDER — FLUCONAZOLE 150 MG/1
150 TABLET ORAL ONCE
Qty: 1 TABLET | Refills: 0 | Status: SHIPPED | OUTPATIENT
Start: 2022-01-07 | End: 2022-01-07

## 2022-01-22 DIAGNOSIS — E08.00 DIABETES MELLITUS DUE TO UNDERLYING CONDITION WITH HYPEROSMOLARITY WITHOUT COMA, UNSPECIFIED WHETHER LONG TERM INSULIN USE (HCC): ICD-10-CM

## 2022-01-22 DIAGNOSIS — I10 ESSENTIAL HYPERTENSION: ICD-10-CM

## 2022-01-24 NOTE — TELEPHONE ENCOUNTER
Please address the medication refill and close the encounter. If I can be of assistance, please route to the applicable pool. Thank you. Last visit: 1-3-22  Last Med refill: 1/4/22 lancets    11/24/21 amlodipine  Does patient have enough medication for 72 hours: No:     Next Visit Date:  No future appointments. Health Maintenance   Topic Date Due    COVID-19 Vaccine (1) Never done    Diabetic retinal exam  Never done    Colon cancer screen colonoscopy  Never done    Breast cancer screen  Never done    Shingles Vaccine (1 of 2) Never done    Low dose CT lung screening  Never done    DEXA (modify frequency per FRAX score)  Never done    Flu vaccine (1) 09/01/2021    Annual Wellness Visit (AWV)  10/20/2021    Diabetic microalbuminuria test  04/23/2022    Diabetic foot exam  09/27/2022    Lipid screen  11/17/2022    Potassium monitoring  11/17/2022    Creatinine monitoring  11/17/2022    A1C test (Diabetic or Prediabetic)  01/03/2023    Depression Monitoring  01/03/2023    Pneumococcal 65+ years Vaccine (1 of 1 - PPSV23) 10/02/2025    DTaP/Tdap/Td vaccine (2 - Td or Tdap) 10/02/2030    Hepatitis C screen  Completed    Hepatitis A vaccine  Aged Out    Hib vaccine  Aged Out    Meningococcal (ACWY) vaccine  Aged Out       Hemoglobin A1C (%)   Date Value   01/03/2022 7.1   11/17/2021 7.9 (H)   11/17/2021 7.9 (H)             ( goal A1C is < 7)   Microalb/Crt.  Ratio (mcg/mg creat)   Date Value   04/23/2021 83 (H)     LDL Cholesterol (mg/dL)   Date Value   11/17/2021 62   09/17/2020            (goal LDL is <100)   BUN (mg/dL)   Date Value   11/17/2021 14     BP Readings from Last 3 Encounters:   01/03/22 (!) 152/86   12/02/21 (!) 147/93   09/27/21 128/86          (goal 120/80)    All Future Testing planned in [facility]  Lab Frequency Next Occurrence   Lipid Panel Once 09/27/2022   CBC With Auto Differential Once 96/69/0827   Basic Metabolic Panel Once 78/47/5095   Hepatitis C Antibody Once 09/27/2022   Low Dose Chest CT -Abnormal Lung Screen Follow up Once 09/27/2022   TSH With Reflex Ft4 Once 09/27/2022   HIV Screen Once 09/27/2022   [facility] DIGITAL SCREEN W OR WO CAD BILATERAL Once 02/02/2023   DEXA BONE DENSITY AXIAL SKELETON Once 02/02/2023               Patient Active Problem List:     Dysuria     Urinary tract infection with hematuria     Type 2 diabetes mellitus     Major depressive disorder, recurrent, mild     Major depressive disorder, recurrent, moderate     Major depressive disorder, recurrent, unspecified

## 2022-01-28 RX ORDER — LANCETS 33 GAUGE
EACH MISCELLANEOUS
Qty: 100 EACH | Refills: 0 | Status: SHIPPED | OUTPATIENT
Start: 2022-01-28

## 2022-01-28 RX ORDER — AMLODIPINE BESYLATE 5 MG/1
5 TABLET ORAL DAILY
Qty: 30 TABLET | Refills: 2 | Status: SHIPPED | OUTPATIENT
Start: 2022-01-28 | End: 2022-11-04

## 2022-04-06 ENCOUNTER — TELEPHONE (OUTPATIENT)
Dept: FAMILY MEDICINE CLINIC | Age: 67
End: 2022-04-06

## 2022-04-06 NOTE — TELEPHONE ENCOUNTER
----- Message from Negrita Jany sent at 4/6/2022  1:36 PM EDT -----  Subject: Appointment Request    Reason for Call: Semi-Routine Skin Problem    QUESTIONS  Type of Appointment? Established Patient  Reason for appointment request? No appointments available during search  Additional Information for Provider? Patient has lump on back. She called   3/25 and still did not get a call back  ---------------------------------------------------------------------------  --------------  [employer]  What is the best way for the office to contact you? OK to leave message on   voicemail  Preferred Call Back Phone Number? [phone]  ---------------------------------------------------------------------------  --------------  SCRIPT ANSWERS  Relationship to Patient? Self  Are you having swelling in your throat or face? No  Are you having difficulty breathing? No  Have the symptoms worsened or spread in the last day? No  Are you having fevers (100.4), chills or sweats? No  Have you recently (14 days) seen a provider for this issue? No  Have you been diagnosed with, awaiting test results for, or told that you   are suspected of having COVID-19 (Coronavirus)? (If patient has tested   negative or was tested as a requirement for work, school, or travel and   not based on symptoms, answer no)? No  Within the past 10 days have you developed any of the following symptoms   (answer no if symptoms have been present longer than 10 days or began   more than 10 days ago)? Fever or Chills, Cough, Shortness of breath or   difficulty breathing, Loss of taste or smell, Sore throat, Nasal   congestion, Sneezing or runny nose, Fatigue or generalized body aches   (answer no if pain is specific to a body part e.g. back pain), Diarrhea,   Headache? No  Have you had close contact with someone with COVID-19 in the last 7 days? No  (Service Expert  click yes below to proceed with Linebacker As Usual   Scheduling)?  Yes

## 2022-04-29 DIAGNOSIS — E08.00 DIABETES MELLITUS DUE TO UNDERLYING CONDITION WITH HYPEROSMOLARITY WITHOUT COMA, UNSPECIFIED WHETHER LONG TERM INSULIN USE (HCC): ICD-10-CM

## 2022-04-29 NOTE — TELEPHONE ENCOUNTER
Last visit:   Last Med refill:   Does patient have enough medication for 72 hours: No:     Next Visit Date:  Future Appointments   Date Time Provider Ned Mishra   5/23/2022  2:30 PM Amber Mojica DO [address] [place] [address] Maintenance   Topic Date Due    COVID-19 Vaccine (1) Never done    Diabetic retinal exam  Never done    Colorectal Cancer Screen  Never done    Breast cancer screen  Never done    Shingles vaccine (1 of 2) Never done    Low dose CT lung screening  Never done    DEXA (modify frequency per FRAX score)  Never done    Pneumococcal 65+ years Vaccine (2 - PCV) 10/02/2021    Annual Wellness Visit (AWV)  10/20/2021    Diabetic microalbuminuria test  04/23/2022    Flu vaccine (Season Ended) 09/01/2022    Diabetic foot exam  09/27/2022    Lipids  11/17/2022    Potassium  11/17/2022    Creatinine  11/17/2022    A1C test (Diabetic or Prediabetic)  01/03/2023    Depression Monitoring  01/03/2023    DTaP/Tdap/Td vaccine (2 - Td or Tdap) 10/02/2030    Hepatitis C screen  Completed    Hepatitis A vaccine  Aged Out    Hib vaccine  Aged Out    Meningococcal (ACWY) vaccine  Aged Out       Hemoglobin A1C (%)   Date Value   01/03/2022 7.1   11/17/2021 7.9 (H)   11/17/2021 7.9 (H)             ( goal A1C is < 7)   Microalb/Crt.  Ratio (mcg/mg creat)   Date Value   04/23/2021 83 (H)     LDL Cholesterol (mg/dL)   Date Value   11/17/2021 62   09/17/2020            (goal LDL is <100)   BUN (mg/dL)   Date Value   11/17/2021 14     BP Readings from Last 3 Encounters:   01/03/22 (!) 152/86   12/02/21 (!) 147/93   09/27/21 128/86          (goal 120/80)    All Future Testing planned in [facility]  Lab Frequency Next Occurrence   Lipid Panel Once 09/27/2022   CBC With Auto Differential Once 72/31/8139   Basic Metabolic Panel Once 27/68/2529   Hepatitis C Antibody Once 09/27/2022   Low Dose Chest CT -Abnormal Lung Screen Follow up Once 09/27/2022   TSH With Reflex Ft4 Once 09/27/2022   HIV Screen Once 09/27/2022   BYRON DIGITAL SCREEN W OR WO CAD BILATERAL Once 02/02/2023   DEXA BONE DENSITY AXIAL SKELETON Once 02/02/2023               Patient Active Problem List:     Dysuria     Urinary tract infection with hematuria     Type 2 diabetes mellitus     Major depressive disorder, recurrent, mild     Major depressive disorder, recurrent, moderate     Major depressive disorder, recurrent, unspecified           Please address the medication refill and close the encounter. If I can be of assistance, please route to the applicable pool. Thank you.

## 2022-05-20 ENCOUNTER — OFFICE VISIT (OUTPATIENT)
Dept: FAMILY MEDICINE CLINIC | Age: 67
End: 2022-05-20
Payer: MEDICARE

## 2022-05-20 VITALS
SYSTOLIC BLOOD PRESSURE: 149 MMHG | HEART RATE: 75 BPM | WEIGHT: 168.8 LBS | DIASTOLIC BLOOD PRESSURE: 86 MMHG | BODY MASS INDEX: 30.87 KG/M2

## 2022-05-20 DIAGNOSIS — N30.00 ACUTE CYSTITIS WITHOUT HEMATURIA: Primary | ICD-10-CM

## 2022-05-20 LAB
BILIRUBIN, POC: NEGATIVE
BLOOD URINE, POC: ABNORMAL
CLARITY, POC: CLEAR
COLOR, POC: YELLOW
GLUCOSE URINE, POC: NEGATIVE
KETONES, POC: ABNORMAL
LEUKOCYTE EST, POC: NEGATIVE
NITRITE, POC: NEGATIVE
PH, POC: 5
PROTEIN, POC: 100
SPECIFIC GRAVITY, POC: 1.03
UROBILINOGEN, POC: 0.2

## 2022-05-20 PROCEDURE — 99213 OFFICE O/P EST LOW 20 MIN: CPT | Performed by: STUDENT IN AN ORGANIZED HEALTH CARE EDUCATION/TRAINING PROGRAM

## 2022-05-20 PROCEDURE — 81003 URINALYSIS AUTO W/O SCOPE: CPT | Performed by: STUDENT IN AN ORGANIZED HEALTH CARE EDUCATION/TRAINING PROGRAM

## 2022-05-20 RX ORDER — PHENAZOPYRIDINE HYDROCHLORIDE 100 MG/1
100 TABLET, FILM COATED ORAL 3 TIMES DAILY PRN
Qty: 3 TABLET | Refills: 0 | Status: SHIPPED | OUTPATIENT
Start: 2022-05-20 | End: 2023-05-20

## 2022-05-20 RX ORDER — NITROFURANTOIN 25; 75 MG/1; MG/1
100 CAPSULE ORAL 2 TIMES DAILY
Qty: 10 CAPSULE | Refills: 0 | Status: SHIPPED | OUTPATIENT
Start: 2022-05-20 | End: 2022-05-25

## 2022-05-20 ASSESSMENT — PATIENT HEALTH QUESTIONNAIRE - PHQ9
SUM OF ALL RESPONSES TO PHQ QUESTIONS 1-9: 8
9. THOUGHTS THAT YOU WOULD BE BETTER OFF DEAD, OR OF HURTING YOURSELF: 0
4. FEELING TIRED OR HAVING LITTLE ENERGY: 2
SUM OF ALL RESPONSES TO PHQ QUESTIONS 1-9: 8
SUM OF ALL RESPONSES TO PHQ9 QUESTIONS 1 & 2: 4
1. LITTLE INTEREST OR PLEASURE IN DOING THINGS: 2
8. MOVING OR SPEAKING SO SLOWLY THAT OTHER PEOPLE COULD HAVE NOTICED. OR THE OPPOSITE, BEING SO FIGETY OR RESTLESS THAT YOU HAVE BEEN MOVING AROUND A LOT MORE THAN USUAL: 0
5. POOR APPETITE OR OVEREATING: 0
10. IF YOU CHECKED OFF ANY PROBLEMS, HOW DIFFICULT HAVE THESE PROBLEMS MADE IT FOR YOU TO DO YOUR WORK, TAKE CARE OF THINGS AT HOME, OR GET ALONG WITH OTHER PEOPLE: 0
7. TROUBLE CONCENTRATING ON THINGS, SUCH AS READING THE NEWSPAPER OR WATCHING TELEVISION: 0
SUM OF ALL RESPONSES TO PHQ QUESTIONS 1-9: 8
2. FEELING DOWN, DEPRESSED OR HOPELESS: 2
6. FEELING BAD ABOUT YOURSELF - OR THAT YOU ARE A FAILURE OR HAVE LET YOURSELF OR YOUR FAMILY DOWN: 0
SUM OF ALL RESPONSES TO PHQ QUESTIONS 1-9: 8
3. TROUBLE FALLING OR STAYING ASLEEP: 2

## 2022-05-20 ASSESSMENT — ENCOUNTER SYMPTOMS
BACK PAIN: 0
CONSTIPATION: 0
SHORTNESS OF BREATH: 0
ABDOMINAL DISTENTION: 0
VOMITING: 0
DIARRHEA: 0
APNEA: 0
NAUSEA: 0
ABDOMINAL PAIN: 0
CHEST TIGHTNESS: 0

## 2022-05-20 NOTE — PATIENT INSTRUCTIONS
Thank you for letting us take care of you today. We hope all your questions were addressed. If a question was overlooked or something else comes to mind after you return home, please contact a member of your Care Team listed below. Your Care Team at [place] [address] is Team #5  Aida Nixon MD (Faculty)  Yasmeen Juarez MD (Resident)  Susana Cary MD (Resident)  Camilla Peng MD (Resident)  Adama Prado MD (Resident)  Jamil Bennett., BEN Quintero., HIGINIO Saxena., Sonia Streeter., [facility])  Yanet Cowart, [address] (Clinical Practice Manager)  [facility] (Clinical Pharmacist)       Office phone number: [phone]    If you need to get in right away due to illness, please be advised we have \"Same Day\" appointments available Monday-Friday. Please call us at [phone] option #3 to schedule your \"Same Day\" appointment.

## 2022-05-20 NOTE — PROGRESS NOTES
Attending Physician Statement  I have discussed the care of [facility], including pertinent history and exam findings,  with the resident. I have reviewed the key elements of all parts of the encounter with the resident. I agree with the assessment, plan and orders as documented by the resident.   (Lindsey Morillo)    Magnus Cruz MD

## 2022-05-20 NOTE — PROGRESS NOTES
Visit Information    Have you changed or started any medications since your last visit including any over-the-counter medicines, vitamins, or herbal medicines? no   Are you having any side effects from any of your medications? -  no  Have you stopped taking any of your medications? Is so, why? -  no    Have you seen any other physician or provider since your last visit? No  Have you had any other diagnostic tests since your last visit? No  Have you been seen in the emergency room and/or had an admission to a hospital since we last saw you? No  Have you had your routine dental cleaning in the past 6 months? no    Have you activated your [employer] account? If not, what are your barriers?  Yes     Patient Care Team:  Adriano Taylor DO as PCP - General (Family Medicine)  Adriano Taylor DO as PCP - [facility]    Medical History Review  Past Medical, Family, and Social History reviewed and does not contribute to the patient presenting condition    Health Maintenance   Topic Date Due    COVID-19 Vaccine (1) Never done    Diabetic retinal exam  Never done    Colorectal Cancer Screen  Never done    Breast cancer screen  Never done    Shingles vaccine (1 of 2) Never done    Low dose CT lung screening  Never done    DEXA (modify frequency per FRAX score)  Never done    Pneumococcal 65+ years Vaccine (2 - PCV) 10/02/2021    Annual Wellness Visit (AWV)  10/20/2021    Diabetic microalbuminuria test  04/23/2022    Flu vaccine (Season Ended) 09/01/2022    Diabetic foot exam  09/27/2022    Lipids  11/17/2022    A1C test (Diabetic or Prediabetic)  01/03/2023    Depression Monitoring  01/03/2023    DTaP/Tdap/Td vaccine (2 - Td or Tdap) 10/02/2030    Hepatitis C screen  Completed    Hepatitis A vaccine  Aged Out    Hib vaccine  Aged Out    Meningococcal (ACWY) vaccine  Aged Out

## 2022-05-20 NOTE — PROGRESS NOTES
Subjective:    Fiona Carson is a 77 y.o. female with  has no past medical history on file. Presented to the office today for:  Chief Complaint   Patient presents with    Urinary Tract Infection       HPI    Patient is a 14-year-old female who is seen today for same-day visit due to concerns for UTI. Patient states she has had many urinary tract infections in the past, is currently having urinary frequency, urgency, pain, burning on urination. Patient states she has some chills, denied a fever. No other issues today, will treat regardless of urinalysis results. Review of Systems   Constitutional: Negative for activity change, appetite change, chills and fever. Respiratory: Negative for apnea, chest tightness and shortness of breath. Cardiovascular: Negative for chest pain, palpitations and leg swelling. Gastrointestinal: Negative for abdominal distention, abdominal pain, constipation, diarrhea, nausea and vomiting. Genitourinary: Positive for dysuria, frequency and urgency. Negative for difficulty urinating and hematuria. Musculoskeletal: Negative for arthralgias, back pain and neck pain. Neurological: Negative for dizziness, tremors, facial asymmetry, light-headedness and headaches. Psychiatric/Behavioral: Negative for agitation, behavioral problems and confusion. The patient is not nervous/anxious. The patient has a No family history on file. Objective:    BP (!) 149/86 (Site: Left Upper Arm, Position: Sitting, Cuff Size: Medium Adult)   Pulse 75   Wt 168 lb 12.8 oz (76.6 kg)   BMI 30.87 kg/m²    BP Readings from Last 3 Encounters:   05/20/22 (!) 149/86   01/03/22 (!) 152/86   12/02/21 (!) 147/93       Physical Exam  Vitals and nursing note reviewed. Constitutional:       Appearance: Normal appearance. She is obese. Cardiovascular:      Rate and Rhythm: Normal rate and regular rhythm. Pulses: Normal pulses. Heart sounds: Normal heart sounds. Pulmonary:      Effort: Pulmonary effort is normal.      Breath sounds: Normal breath sounds. Abdominal:      General: Abdomen is flat. Bowel sounds are normal.      Palpations: Abdomen is soft. Neurological:      General: No focal deficit present. Mental Status: She is alert and oriented to person, place, and time. Mental status is at baseline. Psychiatric:         Mood and Affect: Mood normal.         Behavior: Behavior normal.         Thought Content: Thought content normal.         Judgment: Judgment normal.         Lab Results   Component Value Date    WBC 11.1 11/17/2021    HGB 13.3 11/17/2021    HCT 43.8 11/17/2021     11/17/2021    CHOL 145 11/17/2021    TRIG 223 (H) 11/17/2021    HDL 38 (L) 11/17/2021    LDLDIRECT 57 09/17/2020     11/17/2021    K 4.2 11/17/2021     11/17/2021    CREATININE 0.53 11/17/2021    BUN 14 11/17/2021    CO2 21 11/17/2021    TSH 2.39 11/17/2021    LABA1C 7.1 01/03/2022    LABMICR 83 (H) 04/23/2021     Lab Results   Component Value Date    CALCIUM 9.4 11/17/2021     Lab Results   Component Value Date    LDLCHOLESTEROL 62 11/17/2021    LDLDIRECT 57 09/17/2020       Assessment and Plan:    1. Acute cystitis without hematuria  - nitrofurantoin, macrocrystal-monohydrate, (MACROBID) 100 MG capsule; Take 1 capsule by mouth 2 times daily for 5 days  Dispense: 10 capsule; Refill: 0  - phenazopyridine (PYRIDIUM) 100 MG tablet; Take 1 tablet by mouth 3 times daily as needed for Pain  Dispense: 3 tablet; Refill: 0  -POCT urinalysis negative, treated regardless due to symptoms        Requested Prescriptions     Signed Prescriptions Disp Refills    nitrofurantoin, macrocrystal-monohydrate, (MACROBID) 100 MG capsule 10 capsule 0     Sig: Take 1 capsule by mouth 2 times daily for 5 days    phenazopyridine (PYRIDIUM) 100 MG tablet 3 tablet 0     Sig: Take 1 tablet by mouth 3 times daily as needed for Pain       There are no discontinued medications.     Leanne Johnston received counseling on the following healthy behaviors: nutrition, exercise and medication adherence    Discussed use,benefit, and side effects of prescribed medications. Barriers to medication compliance addressed. All patient questions answered. Pt voiced understanding. No follow-ups on file. Disclaimer: Some orall of this note was transcribed using voice-recognition software. This may cause typographical errors occasionally. Although all effort is made to fix these errors, please do not hesitate to contact our office if there Everett Garsia concern with the understanding of this note.

## 2022-05-23 ENCOUNTER — TELEPHONE (OUTPATIENT)
Dept: FAMILY MEDICINE CLINIC | Age: 67
End: 2022-05-23

## 2022-05-23 NOTE — TELEPHONE ENCOUNTER
Writer called the patient to make appointment with Dr. Pascale Haney next opening is in July pt states she will call back in [place]

## 2022-09-02 ENCOUNTER — TELEPHONE (OUTPATIENT)
Dept: FAMILY MEDICINE CLINIC | Age: 67
End: 2022-09-02

## 2022-10-04 ENCOUNTER — OFFICE VISIT (OUTPATIENT)
Dept: FAMILY MEDICINE CLINIC | Age: 67
End: 2022-10-04
Payer: MEDICARE

## 2022-10-04 VITALS
BODY MASS INDEX: 30.18 KG/M2 | DIASTOLIC BLOOD PRESSURE: 84 MMHG | HEART RATE: 65 BPM | WEIGHT: 164 LBS | HEIGHT: 62 IN | SYSTOLIC BLOOD PRESSURE: 131 MMHG

## 2022-10-04 DIAGNOSIS — Z00.00 MEDICARE ANNUAL WELLNESS VISIT, SUBSEQUENT: Primary | ICD-10-CM

## 2022-10-04 PROCEDURE — 1123F ACP DISCUSS/DSCN MKR DOCD: CPT | Performed by: FAMILY MEDICINE

## 2022-10-04 PROCEDURE — G0439 PPPS, SUBSEQ VISIT: HCPCS | Performed by: FAMILY MEDICINE

## 2022-10-04 ASSESSMENT — PATIENT HEALTH QUESTIONNAIRE - PHQ9
8. MOVING OR SPEAKING SO SLOWLY THAT OTHER PEOPLE COULD HAVE NOTICED. OR THE OPPOSITE, BEING SO FIGETY OR RESTLESS THAT YOU HAVE BEEN MOVING AROUND A LOT MORE THAN USUAL: 0
6. FEELING BAD ABOUT YOURSELF - OR THAT YOU ARE A FAILURE OR HAVE LET YOURSELF OR YOUR FAMILY DOWN: 0
10. IF YOU CHECKED OFF ANY PROBLEMS, HOW DIFFICULT HAVE THESE PROBLEMS MADE IT FOR YOU TO DO YOUR WORK, TAKE CARE OF THINGS AT HOME, OR GET ALONG WITH OTHER PEOPLE: 0
SUM OF ALL RESPONSES TO PHQ QUESTIONS 1-9: 2
SUM OF ALL RESPONSES TO PHQ QUESTIONS 1-9: 2
SUM OF ALL RESPONSES TO PHQ9 QUESTIONS 1 & 2: 0
5. POOR APPETITE OR OVEREATING: 0
2. FEELING DOWN, DEPRESSED OR HOPELESS: 0
9. THOUGHTS THAT YOU WOULD BE BETTER OFF DEAD, OR OF HURTING YOURSELF: 0
7. TROUBLE CONCENTRATING ON THINGS, SUCH AS READING THE NEWSPAPER OR WATCHING TELEVISION: 1
4. FEELING TIRED OR HAVING LITTLE ENERGY: 1
SUM OF ALL RESPONSES TO PHQ QUESTIONS 1-9: 2
3. TROUBLE FALLING OR STAYING ASLEEP: 0
SUM OF ALL RESPONSES TO PHQ QUESTIONS 1-9: 2
1. LITTLE INTEREST OR PLEASURE IN DOING THINGS: 0

## 2022-10-04 ASSESSMENT — LIFESTYLE VARIABLES
HOW OFTEN DO YOU HAVE A DRINK CONTAINING ALCOHOL: NEVER
HOW MANY STANDARD DRINKS CONTAINING ALCOHOL DO YOU HAVE ON A TYPICAL DAY: PATIENT DOES NOT DRINK

## 2022-10-04 NOTE — PROGRESS NOTES
Medicare Annual Wellness Visit    Noel Madrigal is here for Medicare AWV (Annual)    Assessment & Plan   Medicare annual wellness visit, subsequent    Recommendations for Preventive Services Due: see orders and patient instructions/AVS.  Recommended screening schedule for the next 5-10 years is provided to the patient in written form: see Patient Instructions/AVS.     No follow-ups on file. Subjective   The following acute and/or chronic problems were also addressed today:  diabetes    Patient's complete Health Risk Assessment and screening values have been reviewed and are found in Flowsheets. The following problems were reviewed today and where indicated follow up appointments were made and/or referrals ordered.     Positive Risk Factor Screenings with Interventions:     Cognitive:  Clock Drawing Test (CDT): (!) Abnormal  Total Score Interpretation: Abnormal Mini-Cog  Did the patient refuse to take the cognition test?: No  Cognitive Impairment Interventions:  Patient advised to follow-up in this office for further evaluation and treatment within 12 week(s)     Tobacco Use:  Tobacco Use: High Risk    Smoking Tobacco Use: Some Days    Smokeless Tobacco Use: Never     E-cigarette/Vaping       Questions Responses    E-cigarette/Vaping Use     Start Date     Passive Exposure     Quit Date     Counseling Given     Comments           Substance Use - Tobacco Interventions:  patient is not ready to work toward tobacco cessation at this time         General Health and ACP:  General  In general, how would you say your health is?: (!) Poor  In the past 7 days, have you experienced any of the following: New or Increased Pain, New or Increased Fatigue, Loneliness, Social Isolation, Stress or Anger?: (!) Yes  Select all that apply: (!) New or Increased Pain (vaginal pain and incontinence)  Do you get the social and emotional support that you need?: Yes  Do you have a Living Will?: (!) No (referral ordered)    Advance Directives Power of [employer] Living Will ACP-Advance Directive ACP-Power of     Not on File Not on File Not on File Not on File          General Health Risk Interventions:  No Living Will: Patient referred to [place] Habits/Nutrition:  Physical Activity: Sufficiently Active    Days of Exercise per Week: 7 days    Minutes of Exercise per Session: 30 min     Have you lost any weight without trying in the past 3 months?: No  Body mass index: (!) 29.99  Have you seen the dentist within the past year?: (!) No  Health Habits/Nutrition Interventions:  Dental exam overdue:  dental referral provided     Safety:  Do you have working smoke detectors?: Yes  Do you have any tripping hazards - loose or unsecured carpets or rugs?: No  Do you have any tripping hazards - clutter in doorways, halls, or stairs?: No  Do you have either shower bars, grab bars, non-slip mats or non-slip surfaces in your shower or bathtub?: Yes  Do all of your stairways have a railing or banister?: (!) No  Do you always fasten your seatbelt when you are in a car?: Yes  Safety Interventions:  Patient declines any further evaluation/treatment for this issue           Objective   Vitals:    10/04/22 0828   BP: (!) 145/91   Site: Right Upper Arm   Position: Sitting   Cuff Size: Medium Adult   Pulse: 63   Weight: 164 lb (74.4 kg)   Height: 5' 2\" (1.575 m)      Body mass index is 30 kg/m². Allergies   Allergen Reactions    Sulfamethoxazole-Trimethoprim Rash     Prior to Visit Medications    Medication Sig Taking?  Authorizing Provider   phenazopyridine (PYRIDIUM) 100 MG tablet Take 1 tablet by mouth 3 times daily as needed for Pain Yes Jag Taylor MD   Lancets ([address]) MISC USE AS DIRECTED Yes Dave Stark, DO   citalopram (CELEXA) 40 MG tablet Take 1 tablet by mouth daily Yes Dave Stark DO   levothyroxine (SYNTHROID) 100 MCG tablet Take 1 tablet by mouth daily Yes Sarath Gutiérrez DO [place]   atorvastatin (LIPITOR) 20 MG tablet Take 1 tablet by mouth daily Yes Liseth Wheeler DO   lisinopril (PRINIVIL;ZESTRIL) 20 MG tablet Take 1 tablet by mouth daily Yes Liseth Wheeler DO   glipiZIDE (GLUCOTROL) 5 MG tablet Take 0.5 tablets by mouth 2 times daily (before meals) Yes Liseth Wheeler DO   blood glucose test strips (EXACTECH TEST) strip 1 each by In Vitro route daily As needed. Yes Liseth Wheeler DO   Blood Glucose Monitoring Suppl (TRUE METRIX GO GLUCOSE METER) w/Device KIT Check glucose 1-2 times daily Yes Maurice Lara MD   diclofenac sodium (VOLTAREN) 1 % GEL Apply 4 g topically 4 times daily as needed for Pain Yes Maurice Lara MD   aspirin EC 81 MG EC tablet Take 1 tablet by mouth daily Yes Liseth Wheeler DO   metFORMIN (GLUCOPHAGE) 1000 MG tablet TAKE 1 TABLET BY MOUTH 2 TIMES DAILY (WITH MEALS)  Patient not taking: Reported on 10/4/2022  Liseth Wheeler DO   amLODIPine (NORVASC) 5 MG tablet TAKE 1 TABLET BY MOUTH DAILY  Liseth Wheeler DO       CareTeam (Including outside providers/suppliers regularly involved in providing care):   Patient Care Team:  Liseth Wheeler DO as PCP - General (Family Medicine)  Liseth Wheeler DO [facility]     Reviewed and updated this visit:  Tobacco  Allergies  Meds  Med Hx  Surg Hx  Soc Hx  Fam Hx            I, Amanda Chen LPN, 29/8/9446, performed the documented evaluation under the direct supervision of the attending physician.

## 2022-10-04 NOTE — PATIENT INSTRUCTIONS
Personalized Preventive Plan for Caitlin Zhong - 10/4/2022  Medicare offers a range of preventive health benefits. Some of the tests and screenings are paid in full while other may be subject to a deductible, co-insurance, and/or copay. Some of these benefits include a comprehensive review of your medical history including lifestyle, illnesses that may run in your family, and various assessments and screenings as appropriate. After reviewing your medical record and screening and assessments performed today your provider may have ordered immunizations, labs, imaging, and/or referrals for you. A list of these orders (if applicable) as well as your Preventive Care list are included within your After Visit Summary for your review. Other Preventive Recommendations:    A preventive eye exam performed by an eye specialist is recommended every 1-2 years to screen for glaucoma; cataracts, macular degeneration, and other eye disorders. A preventive dental visit is recommended every 6 months. Try to get at least 150 minutes of exercise per week or 10,000 steps per day on a pedometer . Order or download the FREE \"Exercise & Physical Activity: Your Everyday Guide\" from [employer]. Call 5-142.104.3981 or search [employer] online. You need 5386-3597 mg of calcium and 1909-2096 IU of vitamin D per day. It is possible to meet your calcium requirement with diet alone, but a vitamin D supplement is usually necessary to meet this goal.  When exposed to the sun, use a sunscreen that protects against both UVA and UVB radiation with an SPF of 30 or greater. Reapply every 2 to 3 hours or after sweating, drying off with a towel, or swimming. Always wear a seat belt when traveling in a car. Always wear a helmet when riding a bicycle or motorcycle.

## 2022-10-05 ENCOUNTER — CLINICAL DOCUMENTATION (OUTPATIENT)
Dept: SPIRITUAL SERVICES | Age: 67
End: 2022-10-05

## 2022-10-05 NOTE — ACP (ADVANCE CARE PLANNING)
Advance Care Planning   Ambulatory ACP Specialist Patient Outreach    Date:  10/5/2022  ACP Specialist:  Vu Daniels    Outreach call to patient in follow-up to ACP Specialist referral from: Evangelista Black DO    [x] PCP  [] Provider   [] Ambulatory Care Management [] Other for Reason:    [x] Advance Directive Assistance  [] Code Status Discussion  [] Complete Portable DNR Order  [] Discuss Goals of Care  [] Complete POST/MOST  [] Early ACP Decision-Making  [] Other    Date Referral Received:10/4/22    Today's Outreach:  [x] First   [] Second  [] Third                               First outreach made by [x]  phone  [] email []   [employer]     Intervention:  [] Spoke with Patient  [x] Left VM requesting return call      Outcome: Left detailed VM for Patient to return call. Next Step:   [] ACP scheduled conversation  [x] Outreach again in two week               [] Email / Mail ACP Info Sheets  [] Email / Mail Advance Directive            [] Close Referral. Routing closure to referring provider/staff and to ACP Specialist . [] Closure Letter mailed to Patient with Invitation to Contact ACP Specialist if/when ready.     Thank you for this referral.

## 2022-10-06 ENCOUNTER — TELEPHONE (OUTPATIENT)
Dept: FAMILY MEDICINE CLINIC | Age: 67
End: 2022-10-06

## 2022-10-06 NOTE — TELEPHONE ENCOUNTER
Pt contacted office in a lot of pain with wiping in vaginal area. No opening today in office. Writer attempted to shayy pt appt tomorrow unable to make the appt due to need an appt after 3 due to work scheduled. Writer suggested if need be could go to walk in clinic. Pt shayy for Monday 10-10-22.

## 2022-10-10 ENCOUNTER — OFFICE VISIT (OUTPATIENT)
Dept: FAMILY MEDICINE CLINIC | Age: 67
End: 2022-10-10
Payer: MEDICARE

## 2022-10-10 ENCOUNTER — HOSPITAL ENCOUNTER (OUTPATIENT)
Age: 67
Setting detail: SPECIMEN
Discharge: HOME | End: 2022-10-10

## 2022-10-10 VITALS
SYSTOLIC BLOOD PRESSURE: 139 MMHG | DIASTOLIC BLOOD PRESSURE: 89 MMHG | BODY MASS INDEX: 29.85 KG/M2 | WEIGHT: 162.2 LBS | TEMPERATURE: 97.1 F | HEIGHT: 62 IN | HEART RATE: 77 BPM

## 2022-10-10 DIAGNOSIS — N76.0 ACUTE VAGINITIS: ICD-10-CM

## 2022-10-10 DIAGNOSIS — L72.0 INCLUSION CYST: ICD-10-CM

## 2022-10-10 DIAGNOSIS — R73.9 HYPERGLYCEMIA: ICD-10-CM

## 2022-10-10 DIAGNOSIS — F17.200 SMOKER: ICD-10-CM

## 2022-10-10 DIAGNOSIS — R06.2 WHEEZING: Primary | ICD-10-CM

## 2022-10-10 DIAGNOSIS — E11.9 TYPE 2 DIABETES MELLITUS WITHOUT COMPLICATION, WITHOUT LONG-TERM CURRENT USE OF INSULIN (HCC): ICD-10-CM

## 2022-10-10 LAB
CHP ED QC CHECK: ABNORMAL
GLUCOSE BLD-MCNC: 386 MG/DL

## 2022-10-10 PROCEDURE — 99214 OFFICE O/P EST MOD 30 MIN: CPT | Performed by: FAMILY MEDICINE

## 2022-10-10 PROCEDURE — 82962 GLUCOSE BLOOD TEST: CPT | Performed by: FAMILY MEDICINE

## 2022-10-10 PROCEDURE — 3051F HG A1C>EQUAL 7.0%<8.0%: CPT | Performed by: FAMILY MEDICINE

## 2022-10-10 PROCEDURE — 1123F ACP DISCUSS/DSCN MKR DOCD: CPT | Performed by: FAMILY MEDICINE

## 2022-10-10 RX ORDER — ALBUTEROL SULFATE 90 UG/1
2 AEROSOL, METERED RESPIRATORY (INHALATION) 4 TIMES DAILY PRN
Qty: 18 G | Refills: 0 | Status: SHIPPED | OUTPATIENT
Start: 2022-10-10

## 2022-10-10 SDOH — ECONOMIC STABILITY: FOOD INSECURITY: WITHIN THE PAST 12 MONTHS, YOU WORRIED THAT YOUR FOOD WOULD RUN OUT BEFORE YOU GOT MONEY TO BUY MORE.: NEVER TRUE

## 2022-10-10 SDOH — ECONOMIC STABILITY: FOOD INSECURITY: WITHIN THE PAST 12 MONTHS, THE FOOD YOU BOUGHT JUST DIDN'T LAST AND YOU DIDN'T HAVE MONEY TO GET MORE.: NEVER TRUE

## 2022-10-10 ASSESSMENT — SOCIAL DETERMINANTS OF HEALTH (SDOH): HOW HARD IS IT FOR YOU TO PAY FOR THE VERY BASICS LIKE FOOD, HOUSING, MEDICAL CARE, AND HEATING?: NOT HARD AT ALL

## 2022-10-10 NOTE — PROGRESS NOTES
[facility] PHYSICIANS  [facility] FAMILY PHYSICIANS  [facility] [id number]     Date of Visit:  10/11/2022  Patient Name: Natividad Us   Patient :  1955       Natividad Us is a 79 y.o. female who presents today for an general visit to be evaluated for the following condition(s):  Chief Complaint   Patient presents with    Vaginal Injury     One area that its painfully to wipe, wash, or touch     Blood Sugar Problem    Skin Problem       Emerson Holden is a 78 yo well controlled T2DM female with recent hx of Metformin intolerance p/today with vaginal irritation complaint. She endorses that she does not have a BGM at this time and it is broken. On having her blood glucose checked it is above 300 and she has not had food in over 2 hours. She does wish to increase or start a new medication for her diabetes. We discussed at length the concerns with DIAS increasing at this time especially without a BGM currently. We also revisited starting Metformin at lower dose (500) and taking once a day. Patient is likely not a good candidate for SGLT with age and current vaginal infection. She is amendable to once a week injection with Trulicity but she is aware that cost may be a barrier. She states understanding of the risks of uncontrolled hyperglycemia and risks/benefits/SE profile of all medications discussed today for her DM (DIAS, Metf, GLP and SGLT)     Patient is also c/o a skin lump on the right upper back that is non-painful and not injected. She states this has been present for several months. She denies any discharge. She notices it when putting on clothes or lying down. Patient also states no audible wheezing or shortness of breath, no increase in cough, no change in phlegm or consitutional symptoms. She states she can walk more than 2 blocks on level ground without issue. She also declines steroids at this time for her wheezing and using shared decision making we agree to start her on MARIKA. She is a long time smoker. Vaginal Itching  The patient's primary symptoms include genital itching and a genital rash. The patient's pertinent negatives include no genital odor, missed menses, pelvic pain, vaginal bleeding or vaginal discharge. This is a new problem. The current episode started in the past 7 days. The problem occurs constantly. The problem has been unchanged. The pain is moderate. The problem affects both sides. She is not pregnant. Associated symptoms include rash. Pertinent negatives include no abdominal pain, anorexia, back pain, chills, constipation, diarrhea, discolored urine, dysuria, fever, flank pain, frequency, headaches, hematuria, joint pain, nausea, sore throat or vomiting. Nothing aggravates the symptoms. She has tried nothing for the symptoms. She is postmenopausal.   Skin Problem  This is a new problem. The current episode started more than 1 month ago. The problem is unchanged. The affected locations include the right shoulder. The rash is characterized by swelling. She was exposed to nothing. Pertinent negatives include no anorexia, diarrhea, fatigue, fever, joint pain, shortness of breath, sore throat or vomiting. Review of Systems:  Review of Systems   Constitutional: Negative. Negative for chills, fatigue and fever. HENT:  Negative for sore throat. Respiratory: Negative. Negative for shortness of breath. Cardiovascular: Negative. Gastrointestinal:  Negative for abdominal pain, anorexia, constipation, diarrhea, nausea and vomiting. Genitourinary:  Positive for genital sores and vaginal pain. Negative for decreased urine volume, dysuria, flank pain, frequency, hematuria, missed menses, pelvic pain, vaginal bleeding and vaginal discharge. Musculoskeletal:  Negative for back pain and joint pain. Skin:  Positive for rash. Neurological:  Negative for headaches. Psychiatric/Behavioral:  Negative for confusion and decreased concentration.       Allergies Allergen Reactions    Sulfamethoxazole-Trimethoprim Rash       Patient Active Problem List   Diagnosis    Dysuria    Urinary tract infection with hematuria    Type 2 diabetes mellitus    Major depressive disorder, recurrent, mild    Major depressive disorder, recurrent, moderate    Major depressive disorder, recurrent, unspecified    Acute cystitis without hematuria       No past medical history on file. No past surgical history on file. Social History     Socioeconomic History    Marital status:      Spouse name: None    Number of children: None    Years of education: None    Highest education level: None   Tobacco Use    Smoking status: Some Days     Packs/day: 1.00     Years: 52.00     Pack years: 52.00     Types: Cigarettes    Smokeless tobacco: Never   Substance and Sexual Activity    Alcohol use: Not Currently    Drug use: Never     Social Determinants of Health     Financial Resource Strain: Low Risk     Difficulty of Paying Living Expenses: Not hard at all   Food Insecurity: No Food Insecurity    Worried About Running Out of Food in the Last Year: Never true    Ran Out of Food in the Last Year: Never true   Physical Activity: Sufficiently Active    Days of Exercise per Week: 7 days    Minutes of Exercise per Session: 30 min      Prior to Admission medications    Medication Sig Start Date End Date Taking?  Authorizing Provider   albuterol sulfate HFA (VENTOLIN HFA) 108 (90 Base) MCG/ACT inhaler Inhale 2 puffs into the lungs 4 times daily as needed for Wheezing 10/10/22  Yes Gerry Juarez MD   Blood Gluc Meter Disp-Strips (BLOOD GLUCOSE METER DISPOSABLE) KODY 1 Device by Does not apply route 2 times daily Dispense 100 strips, 100 lancets, 100 alcohol pads 10/10/22  Yes Gerry Juarez MD   miconazole (MICONAZOLE 7) 2 % vaginal cream Place vaginally in the morning and at night (BID) (intravaginal and external) 10/10/22 10/17/22 Yes Gerry Juarez MD   Dulaglutide 0.75 MG/0.5ML Located within [facility] Inject 0.75 mg into the skin once a week 10/10/22  Yes Cecille Lopez MD   phenazopyridine (PYRIDIUM) 100 MG tablet Take 1 tablet by mouth 3 times daily as needed for Pain 5/20/22 5/20/23 Yes Naldo Ibarra MD   [facility] PLUS [id number]) [id number] USE AS DIRECTED 1/28/22  Yes Marcos Mcdonnell DO   citalopram (CELEXA) 40 MG tablet Take 1 tablet by mouth daily 1/3/22 12/29/22 Yes Marcos Mcdonnell DO   levothyroxine (SYNTHROID) 100 MCG tablet Take 1 tablet by mouth daily 1/3/22 12/29/22 Yes Marcos Mcdonnell DO   atorvastatin (LIPITOR) 20 MG tablet Take 1 tablet by mouth daily 1/3/22 12/29/22 Yes Marcos Mcdonnell DO   lisinopril (PRINIVIL;ZESTRIL) 20 MG tablet Take 1 tablet by mouth daily 1/3/22  Yes Marcos Mcodnnell DO   glipiZIDE (GLUCOTROL) 5 MG tablet Take 0.5 tablets by mouth 2 times daily (before meals) 1/3/22  Yes Marcos Mcdonnell DO   blood glucose test strips (EXACTECH TEST) strip 1 each by In Vitro route daily As needed. 1/3/22  Yes Marcos Mcdonnell DO   Blood Glucose Monitoring Suppl (TRUE METRIX GO GLUCOSE METER) w/Device KIT Check glucose 1-2 times daily 9/28/21  Yes Natacha Gabriel MD   diclofenac sodium (VOLTAREN) 1 % GEL Apply 4 g topically 4 times daily as needed for Pain 8/25/21  Yes Natacha Gabriel MD   aspirin EC 81 MG EC tablet Take 1 tablet by mouth daily 1/4/21  Yes Marcos Mcdonnell DO   metFORMIN (GLUCOPHAGE-XR) 500 MG extended release tablet Take 1 tablet by mouth daily (with breakfast) 10/11/22   Cecille Lopez MD   amLODIPine (NORVASC) 5 MG tablet TAKE 1 TABLET BY MOUTH DAILY 1/28/22 4/28/22  Marcos Mcdonnell DO        Physical Exam:    /89 (Site: Left Upper Arm, Position: Sitting, Cuff Size: Medium Adult)   Pulse 77   Temp 97.1 °F (36.2 °C) (Temporal)   Ht 5' 2.01\" (1.575 m)   Wt 162 lb 3.2 oz (73.6 kg)   BMI 29.66 kg/m²    Physical Exam  Vitals and nursing note reviewed.    Constitutional:       General: She is not in acute distress. Appearance: Normal appearance. She is normal weight. She is not ill-appearing, toxic-appearing or diaphoretic. HENT:      Head: Normocephalic and atraumatic. Eyes:      Extraocular Movements: Extraocular movements intact. Conjunctiva/sclera: Conjunctivae normal.   Cardiovascular:      Rate and Rhythm: Normal rate and regular rhythm. Heart sounds: Normal heart sounds. No murmur heard. No friction rub. No gallop. Pulmonary:      Effort: Pulmonary effort is normal. No respiratory distress. Breath sounds: No stridor. Wheezing (diffuse, mild, end expiratory) present. No rhonchi or rales. Chest:      Chest wall: No tenderness. Abdominal:      General: Bowel sounds are normal. There is no distension. Palpations: Abdomen is soft. There is no mass. Tenderness: There is no abdominal tenderness. There is no right CVA tenderness, left CVA tenderness, guarding or rebound. Hernia: No hernia is present. Musculoskeletal:      Cervical back: Normal range of motion and neck supple. No rigidity. Skin:     Capillary Refill: Capillary refill takes less than 2 seconds. Neurological:      General: No focal deficit present. Mental Status: She is alert and oriented to person, place, and time. Psychiatric:         Mood and Affect: Mood normal.         Behavior: Behavior normal.         Thought Content: Thought content normal.         Judgment: Judgment normal.       Assessment/Plan:  1. Wheezing  Comments:  Mild, likely due to smoking, asymptomatic currently, will obtain PFT and start MARIKA if any symptoms, will hold steroids at pt choice and elevated BG currently   Orders:  -     albuterol sulfate HFA (VENTOLIN HFA) 108 (90 Base) MCG/ACT inhaler; Inhale 2 puffs into the lungs 4 times daily as needed for Wheezing, Disp-18 g, R-0Normal  -     Full PFT Study With Bronchodilator; Future  2.  Acute vaginitis  Comments:  Shared decision making/lack of hx of Candidal infection, pt chooses Topical w/ interaction/SE profile, Miconazole BID topical, if unable to tolerate will do PO  Orders:  -     Vaginitis DNA Probe; Future  -     miconazole (MICONAZOLE 7) 2 % vaginal cream; Place vaginally in the morning and at night (BID) (intravaginal and external), Disp-45 g, R-1Normal  3. Type 2 diabetes mellitus without complication, without long-term current use of insulin (HCC)  Comments:  complicated by hyperglcyemia, asymptomatic, pt declined metformin, will hold on DIAS increase until BGM received, will do BGM script too and GLP med pending cost  Orders:  -     Blood Gluc Meter Disp-Strips (BLOOD GLUCOSE METER DISPOSABLE) KODY; 2 TIMES DAILY Starting Mon 10/10/2022, Disp-1 each, R-0, NormalDispense 100 strips, 100 lancets, 100 alcohol pads  -     POCT Glucose  -     Dulaglutide 0.75 MG/0.5ML SOPN; Inject 0.75 mg into the skin once a week, Disp-1 Adjustable Dose Pre-filled Pen Syringe, R-0Normal  4. Smoker  Comments:  Pt unwilling to stop smoking at this time, will discuss further at future visit  Orders:  -     Full PFT Study With Bronchodilator; Future  5. Inclusion cyst  Comments:  Chronic, Moderate, discussed removal once BG is under better control  6. Hyperglycemia  -     Dulaglutide 0.75 MG/0.5ML SOPN; Inject 0.75 mg into the skin once a week, Disp-1 Adjustable Dose Pre-filled Pen Syringe, R-0Normal     Return in about 2 weeks (around 10/24/2022) for Follow up Diabetes and skin concern(s).     Electronically signed by Courtney Wilson MD on 10/11/2022 at 11:38 AM

## 2022-10-10 NOTE — PROGRESS NOTES
Visit Information    Have you changed or started any medications since your last visit including any over-the-counter medicines, vitamins, or herbal medicines? no   Have you stopped taking any of your medications? Is so, why? -  no  Are you having any side effects from any of your medications? - no    Have you seen any other physician or provider since your last visit?  no   Have you had any other diagnostic tests since your last visit?  no   Have you been seen in the emergency room and/or had an admission in a hospital since we last saw you?  no   Have you had your routine dental cleaning in the past 6 months?  no     Do you have an active MyChart account? If no, what is the barrier?   Yes    Patient Care Team:  Alek Rosario DO as PCP - General (Family Medicine)  Alek Rosario DO as PCP - [facility] Provider    Medical History Review  Past Medical, Family, and Social History reviewed and does not contribute to the patient presenting condition    Health Maintenance   Topic Date Due    COVID-19 Vaccine (1) Never done    Diabetic retinal exam  Never done    Colorectal Cancer Screen  Never done    Breast cancer screen  Never done    Shingles vaccine (1 of 2) Never done    Low dose CT lung screening  Never done    DEXA (modify frequency per FRAX score)  Never done    Diabetic microalbuminuria test  04/23/2022    Flu vaccine (1) 08/01/2022    Diabetic foot exam  09/27/2022    Lipids  11/17/2022    A1C test (Diabetic or Prediabetic)  01/03/2023    Depression Monitoring  10/04/2023    Annual Wellness Visit (AWV)  10/05/2023    DTaP/Tdap/Td vaccine (2 - Td or Tdap) 10/02/2030    Pneumococcal 65+ years Vaccine  Completed    Hepatitis C screen  Completed    Hepatitis A vaccine  Aged Out    Hib vaccine  Aged Out    Meningococcal (ACWY) vaccine  Aged Out

## 2022-10-10 NOTE — PATIENT INSTRUCTIONS
Thank you for letting us take care of you today. We hope all your questions were addressed. If a question was overlooked or something else comes to mind after you return home, please contact a member of your Care Team listed below. Your Care Team at [place] [address] is Team #5  Rich Houser MD (Faculty)  Wava Lesch, MD (Resident)  Maryan Duff MD (Resident)  Michael Fields MD (Resident)  Delano Garcia MD, (Resident)  Valentina Nugent., BEN Dumont., ROCKA  Tao Xavier.,  IRENEN  Marcio Bettencourt., Rodo Pate., [facility] office)  Fariha Garg, [address] [facility] [address] (Clinical Practice Manager)  Jagjit Han[facility] (Clinical Pharmacist)       Office phone number: [phone]    If you need to get in right away due to illness, please be advised we have \"Same Day\" appointments available Monday-Friday. Please call us at [phone] option #3 to schedule your \"Same Day\" appointment.

## 2022-10-11 ENCOUNTER — TELEPHONE (OUTPATIENT)
Dept: FAMILY MEDICINE CLINIC | Age: 67
End: 2022-10-11

## 2022-10-11 DIAGNOSIS — E11.9 TYPE 2 DIABETES MELLITUS WITHOUT COMPLICATION, WITHOUT LONG-TERM CURRENT USE OF INSULIN (HCC): Primary | ICD-10-CM

## 2022-10-11 LAB
CANDIDA SPECIES, DNA PROBE: POSITIVE
GARDNERELLA VAGINALIS, DNA PROBE: POSITIVE
SOURCE: ABNORMAL
TRICHOMONAS VAGINALIS DNA: NEGATIVE

## 2022-10-11 RX ORDER — METFORMIN HYDROCHLORIDE 500 MG/1
500 TABLET, EXTENDED RELEASE ORAL
Qty: 90 TABLET | Refills: 1 | Status: SHIPPED | OUTPATIENT
Start: 2022-10-11 | End: 2022-10-19 | Stop reason: SDUPTHER

## 2022-10-11 ASSESSMENT — ENCOUNTER SYMPTOMS
RESPIRATORY NEGATIVE: 1
CONSTIPATION: 0
SORE THROAT: 0
SHORTNESS OF BREATH: 0
ABDOMINAL PAIN: 0
DIARRHEA: 0
BACK PAIN: 0
VOMITING: 0
NAUSEA: 0

## 2022-10-11 NOTE — TELEPHONE ENCOUNTER
The Dulaglutide that you prescribed yesterday is $500 co-pay for her. Patients pharmacy said to change to Metformin extend release 500 or 1000 mg, or to go up on her Glipizide.  Please Advise

## 2022-10-11 NOTE — TELEPHONE ENCOUNTER
The script that you sent yesterday for her meter you put the lancet, alcohol, strips in the sig. They needs scripts for each of them.

## 2022-10-11 NOTE — TELEPHONE ENCOUNTER
Patient called re: Trulicity not being covered and cost at 10:40 AM.  Patient identity confirmed with two separate identifiers. Discussed with patient that I agree with pharmacy recs, and as discussed yesterday, I would prefer Metformin ER as this is safer choice to increase with her diabetes at its current level and elevated blood sugars likely causing her vulvocandidiasis. Patient states she would like to start the Metformin 500 ER once daily. She also states she will  over the counter the miconazole cream and that she has not gotten her BGM. Called pharmacy at 10:47 AM to discuss BGM and provide verbal orders to provide meter and supplies for 30 days with 3 refills for checking blood sugars once daily. Provided NPI and phone number to clinic as well. Pharmacy confirmed prescription.

## 2022-10-12 ENCOUNTER — TELEPHONE (OUTPATIENT)
Dept: FAMILY MEDICINE CLINIC | Age: 67
End: 2022-10-12

## 2022-10-12 DIAGNOSIS — N76.0 BV (BACTERIAL VAGINOSIS): Primary | ICD-10-CM

## 2022-10-12 DIAGNOSIS — B96.89 BV (BACTERIAL VAGINOSIS): Primary | ICD-10-CM

## 2022-10-12 RX ORDER — METRONIDAZOLE 500 MG/1
500 TABLET ORAL 2 TIMES DAILY
Qty: 14 TABLET | Refills: 0 | Status: SHIPPED | OUTPATIENT
Start: 2022-10-12 | End: 2022-10-19

## 2022-10-12 NOTE — TELEPHONE ENCOUNTER
Called patient at 4:23 PM and identity confirmed with 2 separate identifiers. Call patient regarding her positive BV test as well as candidal test with affirm. Discussed at length treating her with Flagyl and risks, benefits, and side effect profile. Patient elects to start course of Flagyl. She states that she is currently on her third dose of the miconazole cream and not noticing really any improvement. Patient counseled at length that if no improvement by Friday she may call the office and patient strongly advised to call on Monday if symptoms still persist and will strongly consider sending Diflucan course. Patient advised at length about how to take Flagyl as well. Patient states understanding of the above in her own words and agreement with above plan. Patient also wished to discuss a placard for handicap status. Patient states that she has trouble walking especially in the colder and slippery weather. Writer advised patient to discuss further with her usual primary care doctor or to come in for evaluation by Benjamin Cervantes. Patient will consider this and may either drop off the form for this for her usual primary care doctor or make an appointment with writer to have this assessed.

## 2022-10-19 ENCOUNTER — TELEPHONE (OUTPATIENT)
Dept: FAMILY MEDICINE CLINIC | Age: 67
End: 2022-10-19

## 2022-10-19 DIAGNOSIS — E11.9 TYPE 2 DIABETES MELLITUS WITHOUT COMPLICATION, WITHOUT LONG-TERM CURRENT USE OF INSULIN (HCC): ICD-10-CM

## 2022-10-19 DIAGNOSIS — R73.9 HYPERGLYCEMIA: Primary | ICD-10-CM

## 2022-10-19 RX ORDER — METFORMIN HYDROCHLORIDE 500 MG/1
500 TABLET, EXTENDED RELEASE ORAL 2 TIMES DAILY
Qty: 90 TABLET | Refills: 1 | Status: SHIPPED | OUTPATIENT
Start: 2022-10-19

## 2022-10-19 NOTE — TELEPHONE ENCOUNTER
Patient contacting office because she was placed on Metformin for her BS levels but states her BS was 284 on 10/18/22 and after checking today it was 300. She would like to know if her Metformin needs to be adjusted, please advise.

## 2022-10-19 NOTE — TELEPHONE ENCOUNTER
Called  at 12:57 PM re: sugar concerns, as this morning her fasting sugar was 360. She is concerned with her sugars being this elevated as usually her sugars are in the 100's. She is currently on 5 mg of glipizide and she is on Metformin 500 ER once daily. We discussed at length treatment options such as 2.5 mg Glipizide BID and Metformin 500 ER BID. Due to glipizide being a long acting DIAS and patient already on 5 mg once daily, we discussed there likely will not be a gross improvement in her blood sugars as opposed to increasing her metformin. Patient aware if diarrhea returns she is to return to 500 mg ER dose which she has been doing good with and no diarrhea. She states she has heard nothing about Trulicity since our visit. We briefly discussed Invokana, a recommendation she heard recently, but due to her current resolving Gyn infections this would likely be a better idea at a later time possibly. We discussed having pharmacy consulted at this time and also refilling the metformin ER as BID. We additionally briefly discussed nutrition interventions however patient only interested in discussing medication options currently but will consider for the future as she was previously well controlled. Patient agreed with the above plan and all questions answered in laymen's terms.

## 2022-10-25 ENCOUNTER — PROCEDURE VISIT (OUTPATIENT)
Dept: FAMILY MEDICINE CLINIC | Age: 67
End: 2022-10-25
Payer: MEDICARE

## 2022-10-25 ENCOUNTER — CLINICAL DOCUMENTATION (OUTPATIENT)
Dept: SPIRITUAL SERVICES | Age: 67
End: 2022-10-25

## 2022-10-25 VITALS
HEIGHT: 62 IN | HEART RATE: 73 BPM | BODY MASS INDEX: 30.8 KG/M2 | DIASTOLIC BLOOD PRESSURE: 100 MMHG | TEMPERATURE: 97.7 F | SYSTOLIC BLOOD PRESSURE: 159 MMHG | WEIGHT: 167.4 LBS

## 2022-10-25 DIAGNOSIS — M25.572 CHRONIC PAIN OF LEFT ANKLE: ICD-10-CM

## 2022-10-25 DIAGNOSIS — Z23 NEED FOR VACCINATION FOR H FLU TYPE B: ICD-10-CM

## 2022-10-25 DIAGNOSIS — E11.65 UNCONTROLLED TYPE 2 DIABETES MELLITUS WITH HYPERGLYCEMIA (HCC): Primary | ICD-10-CM

## 2022-10-25 DIAGNOSIS — Z98.890 S/P SURGICAL MANIPULATION OF ANKLE JOINT: ICD-10-CM

## 2022-10-25 DIAGNOSIS — E11.42 DIABETIC POLYNEUROPATHY ASSOCIATED WITH TYPE 2 DIABETES MELLITUS (HCC): ICD-10-CM

## 2022-10-25 DIAGNOSIS — Z96.9 PRESENCE OF RETAINED HARDWARE: ICD-10-CM

## 2022-10-25 DIAGNOSIS — L72.0 INCLUSION CYST: ICD-10-CM

## 2022-10-25 DIAGNOSIS — M76.72 PERONEAL TENDINITIS OF LEFT LOWER EXTREMITY: ICD-10-CM

## 2022-10-25 DIAGNOSIS — B37.31 VULVOVAGINAL CANDIDIASIS: ICD-10-CM

## 2022-10-25 DIAGNOSIS — L60.2 THICKENED NAILS: ICD-10-CM

## 2022-10-25 DIAGNOSIS — G89.29 CHRONIC PAIN OF LEFT ANKLE: ICD-10-CM

## 2022-10-25 LAB — HBA1C MFR BLD: 10.8 %

## 2022-10-25 PROCEDURE — 99214 OFFICE O/P EST MOD 30 MIN: CPT | Performed by: FAMILY MEDICINE

## 2022-10-25 PROCEDURE — 1123F ACP DISCUSS/DSCN MKR DOCD: CPT | Performed by: FAMILY MEDICINE

## 2022-10-25 PROCEDURE — 3046F HEMOGLOBIN A1C LEVEL >9.0%: CPT | Performed by: FAMILY MEDICINE

## 2022-10-25 PROCEDURE — 90686 IIV4 VACC NO PRSV 0.5 ML IM: CPT | Performed by: FAMILY MEDICINE

## 2022-10-25 PROCEDURE — 83036 HEMOGLOBIN GLYCOSYLATED A1C: CPT | Performed by: FAMILY MEDICINE

## 2022-10-25 RX ORDER — FLUCONAZOLE 150 MG/1
150 TABLET ORAL
Qty: 3 TABLET | Refills: 0 | Status: SHIPPED | OUTPATIENT
Start: 2022-10-25 | End: 2022-11-01

## 2022-10-25 RX ORDER — FLUCONAZOLE 100 MG/1
100 TABLET ORAL
Qty: 3 TABLET | Refills: 0 | Status: SHIPPED | OUTPATIENT
Start: 2022-10-25 | End: 2022-10-25

## 2022-10-25 RX ORDER — GLIPIZIDE 10 MG/1
10 TABLET ORAL 2 TIMES DAILY
Qty: 60 TABLET | Refills: 3 | Status: SHIPPED | OUTPATIENT
Start: 2022-10-25

## 2022-10-25 ASSESSMENT — ENCOUNTER SYMPTOMS
RESPIRATORY NEGATIVE: 1
EYES NEGATIVE: 1
GASTROINTESTINAL NEGATIVE: 1

## 2022-10-25 NOTE — PATIENT INSTRUCTIONS
PLEASE DO 24 HOUR FOOD DIARY WITH [employer] (THROUGH [employer] WEBSITE) PRIOR TO NEXT VISIT AND PRINT THIS TO BRING IT IN! Thank you for letting us take care of you today. We hope all your questions were addressed. If a question was overlooked or something else comes to mind after you return home, please contact a member of your Care Team listed below. Your Care Team at [place] is Team #5  Emir Ramírez MD (Faculty)  Dionisio Azevedo MD (Resident)  Kaykay Acevedo MD (Resident)  Milton Naylor MD (Resident)  Kishan Stewart MD, (Resident)  Silvestre Wong., CMA  Brandon Ham., RMA  Nallely Dietz.,  LPN  Diallo Saavedra., Geraldo Neri., Mouna [facility] office)  [facility] (Clinical Practice Manager)  Dillon Lara [facility] (Clinical Pharmacist)       Office phone number: [phone]    If you need to get in right away due to illness, please be advised we have \"Same Day\" appointments available Monday-Friday. Please call us at [phone] option #3 to schedule your \"Same Day\" appointment.

## 2022-10-25 NOTE — ACP (ADVANCE CARE PLANNING)
Advance Care Planning   Ambulatory ACP Specialist Patient Outreach    Date:  10/25/2022  ACP Specialist:  Mitzy Gonzalez    Outreach call to patient in follow-up to ACP Specialist referral from: Bettina Krabbe, DO    [x] PCP  [] Provider   [] Ambulatory Care Management [] Other for Reason:    [x] Advance Directive Assistance  [] Code Status Discussion  [] Complete Portable DNR Order  [] Discuss Goals of Care  [] Complete POST/MOST  [] Early ACP Decision-Making  [] Other    Date Referral Received: 10/4/22    Today's Outreach:  [] First   [] Second  [] Third                               Second outreach made by [x]  phone  [x] email []   [employer]     Intervention:  [] Spoke with Patient  [x] Left VM requesting return call      Outcome:Left detailed VM for Patient to return call. Sent Email w/ACP Packet included. Next Step:   [] ACP scheduled conversation  [x] Outreach again in twp week               [x] Email / Mail ACP Info Sheets  [x] Email / Mail Advance Directive            [] Close Referral. Routing closure to referring provider/staff and to ACP Specialist . [] Closure Letter mailed to Patient with Invitation to Contact ACP Specialist if/when ready.     Thank you for this referral.

## 2022-10-25 NOTE — PROGRESS NOTES
Diabetic visit information    BP Readings from Last 3 Encounters:   10/10/22 139/89   10/04/22 131/84   05/20/22 (!) 149/86       Hemoglobin A1C (%)   Date Value   01/03/2022 7.1   11/17/2021 7.9 (H)   11/17/2021 7.9 (H)     Microalb/Crt. Ratio (mcg/mg creat)   Date Value   04/23/2021 83 (H)     LDL Cholesterol (mg/dL)   Date Value   11/17/2021 62               Have you changed or started any medications since your last visit including any over-the-counter medicines, vitamins, or herbal medicines? no   Have you stopped taking any of your medications? Is so, why? -  no  Are you having any side effects from any of your medications? - no    Have you seen any other physician or provider since your last visit?  no   Have you had any other diagnostic tests since your last visit? yes - Labs   Have you been seen in the emergency room and/or had an admission in a hospital since we last saw you?  no     Have you had your annual diabetic retinal (eye) exam? No   (ensure copy of exam is in the chart)    Have you had your routine dental cleaning in the past 6 months? no    Do you have an active [employer] account? If not, what are your barriers? Yes    Patient Care Team:  Kirt Clay,  as PCP - General (Family Medicine)  Kirt Clay DO as PCP - [facility] Provider    Medical history Review  Past Medical, Family, and Social History reviewed and does contribute to the patient presenting condition.     Health Maintenance   Topic Date Due    COVID-19 Vaccine (1) Never done    Diabetic retinal exam  Never done    Colorectal Cancer Screen  Never done    Breast cancer screen  Never done    Shingles vaccine (1 of 2) Never done    Low dose CT lung screening  Never done    DEXA (modify frequency per FRAX score)  Never done    Diabetic microalbuminuria test  04/23/2022    Flu vaccine (1) 08/01/2022    Diabetic foot exam  09/27/2022    Lipids  11/17/2022    A1C test (Diabetic or Prediabetic)  01/03/2023    Depression Monitoring  10/04/2023    Annual Wellness Visit (AWV)  10/05/2023    DTaP/Tdap/Td vaccine (2 - Td or Tdap) 10/02/2030    Pneumococcal 65+ years Vaccine  Completed    Hepatitis C screen  Completed    Hepatitis A vaccine  Aged Out    Hib vaccine  Aged Out    Meningococcal (ACWY) vaccine  Aged Out

## 2022-10-25 NOTE — PROGRESS NOTES
[facility]  [facility]  [facility] [address]     Date of Visit:  10/25/2022  Patient Name: Eze Robison   Patient :  1955       Eze Robison is a 79 y.o. female who presents today for an general visit to be evaluated for the following condition(s):  Chief Complaint   Patient presents with    Cyst     On her back,     Diabetes     Follow up,     Vaginal Discharge     Still having issues, she states it hurts more now then last visit, finish treatment       Amelie Troy is a 71-year-old female with history of well-controlled type 2 diabetes on told recent visit revealed likely diabetic infection with vulval vaginal candidiasis and blood sugar in the upper 300 range. Patient has been checking her blood sugar now that she has a blood sugar meter and did increase as well as tolerate the metformin extended release 500 twice a day. In a previous phone conversation patient reported she was taking the glipizide 2.5 mg, or half pill of the 5 mg, twice a day. Patient now reports she is taking the full pill once a day at 4 PM.  She states that she takes most of her medications at 4 PM except for the metformin which she takes at 4:30 AM every day. She takes as her blood sugars twice a day as well at 4 AM and 4 PM.  She states over the last week or 2 her blood sugars have decreased to now the 250 range. However, patient was without a blood sugar meter for the last 2 months. Patient's A1c today is 10.8. We discussed at length next steps including continuing the metformin extended release 500 twice a day, and using shared decision making we agreed to increase the glipizide to 10 mg once daily as opposed to change to glyburide at this time. We also discussed patient's nutrition at length and what is changed in the last few months. She states prior to July she was using the food moar and eating very little.   She also states that and the springtime she had taken the diabetic education class and had grossly changed her eating habits as well. She states since July and working at [employer] she now has been able to purchase food she used to buy which caused her sugars to be very uncontrolled. On 24-hour recall of the food that she normally eats patient states that she usually eats pepperoni sandwiches on bread for lunch, a Glucerna like drink in the morning for breakfast and then tends to eat a lot of snacks during the afternoon and evening time. She states she usually gets off at work around 3 PM and although she does like fruits such as grapes and oranges or cherries, she does also like snacks like cheese balls which is her favorite. She states that she eats a lot of high carb snacks like these. She is interested in changing her nutrition habits though, and would be interested in providing a 24-hour food guide. Writer discussed at length with patient how to create a food 24-hour diary through [employer] demo on the [employer] website which patient will print and bring to next visit. Patient also endorses that her vaginal issue has not resolved. She states that although her vaginitis and symptoms did improve with the Flagyl they ultimately came back and have worsened again. After lengthy discussion about her blood sugars and controlling this, patient does understand that it will be very difficult to treat her infection without controlling her sugars and we agreed to make this a main focus. However in the interim we will start the patient on Diflucan every 72 hours for 3 doses. Patient also advised that she may use neutral pH soaps if she must but to try and avoid any irritation of the vaginal mucosa, douching, etc.     Patient states that she has been having progressive left ankle pain for the last several months that is complicated by a longstanding history of surgery in the left ankle. She states that she has had a handicap placard for some time.   She also endorses over 10-year plus retained hardware in the left ankle and left tibia due to a severe fracture in the distant past.  She states that with the amount of walking she does have [employer] that she is usually getting left lateral ankle pain with more ambulation. She states that when she is doing her  job and standing on the mat it does not seem to affect her. She denies her ankle giving out on her, any swelling, but does state longstanding weakness with external rotation of her foot the surgery. She also does endorse burning and tingling feelings in her toes and hands, she understands that this is likely due to her high blood sugars. We discussed at length treatment options for diabetic neuropathy including gabapentin and topical ointments. After lengthy discussion of risks and benefits, patient elects not to start gabapentin at this time. We also discussed the risks of not receiving a flu vaccine and the COVID-vaccine series at length as well as the benefits and side effect profile of each vaccine, patient elects to receive the flu vaccine at this time and will consider the COVID-vaccine for the future. She understands the risk of pneumonia and COVID related complications as a diabetic, especially a uncontrolled diabetic at this time, including but not limited to severe infection, hospitalization, disability and worse. Briefly discussed with patient importance of obtaining a diabetic eye exam, for which patient states she is due for her repeat exam.  We also discussed the importance of DEXA screening however patient declines at this time. Patient also declines mammography screening at this time as well. Patient stated understanding the risks of each of these screening not being done including but not limited to breast cancer, hip fracture, blindness, etc.    Cyst  This is a chronic problem. The current episode started more than 1 year ago. The problem has been unchanged.  Associated symptoms include arthralgias and numbness (In bilateral feet and hands but mostly burning and tingling). Pertinent negatives include no headaches, neck pain or rash. Diabetes  Pertinent negatives for hypoglycemia include no confusion, dizziness or headaches. Vaginal Discharge  The patient's primary symptoms include pelvic pain and vaginal discharge. Pertinent negatives include no dysuria, frequency, headaches, rash or urgency. Review of Systems:  Review of Systems   Constitutional: Negative. Eyes: Negative. Respiratory: Negative. Cardiovascular: Negative. Gastrointestinal: Negative. Genitourinary:  Positive for pelvic pain, vaginal discharge and vaginal pain. Negative for decreased urine volume, difficulty urinating, dysuria, frequency and urgency. Musculoskeletal:  Positive for arthralgias. Negative for gait problem, neck pain and neck stiffness. Skin:  Negative for rash and wound. Neurological:  Positive for numbness (In bilateral feet and hands but mostly burning and tingling). Negative for dizziness, light-headedness and headaches. Hematological: Negative. Psychiatric/Behavioral:  Negative for confusion. Allergies   Allergen Reactions    Sulfamethoxazole-Trimethoprim Rash       Patient Active Problem List   Diagnosis    Dysuria    Urinary tract infection with hematuria    Type 2 diabetes mellitus    Major depressive disorder, recurrent, mild    Major depressive disorder, recurrent, moderate    Major depressive disorder, recurrent, unspecified    Acute cystitis without hematuria       No past medical history on file. No past surgical history on file.      Social History     Socioeconomic History    Marital status:      Spouse name: None    Number of children: None    Years of education: None    Highest education level: None   Tobacco Use    Smoking status: Some Days     Packs/day: 1.00     Years: 52.00     Pack years: 52.00     Types: Cigarettes    Smokeless tobacco: Never   Substance and Sexual Activity Alcohol use: Not Currently    Drug use: Never     Social Determinants of Health     Financial Resource Strain: Low Risk     Difficulty of Paying Living Expenses: Not hard at all   Food Insecurity: No Food Insecurity    Worried About Running Out of Food in the Last Year: Never true    Ran Out of Food in the Last Year: Never true   Physical Activity: Sufficiently Active    Days of Exercise per Week: 7 days    Minutes of Exercise per Session: 30 min        Physical Exam:    BP (!) 159/100 (Site: Right Upper Arm, Position: Sitting, Cuff Size: Medium Adult)   Pulse 73   Temp 97.7 °F (36.5 °C) (Oral)   Ht 5' 2.01\" (1.575 m)   Wt 167 lb 6.4 oz (75.9 kg)   BMI 30.61 kg/m²    Physical Exam  Vitals and nursing note reviewed. Exam conducted with a chaperone present. Constitutional:       General: She is not in acute distress. Appearance: Normal appearance. She is not ill-appearing, toxic-appearing or diaphoretic. Comments: Overweight   HENT:      Head: Normocephalic and atraumatic. Eyes:      Extraocular Movements: Extraocular movements intact. Conjunctiva/sclera: Conjunctivae normal.   Cardiovascular:      Rate and Rhythm: Normal rate and regular rhythm. Heart sounds: Normal heart sounds. No murmur heard. No friction rub. No gallop. Pulmonary:      Effort: Pulmonary effort is normal. No respiratory distress. Breath sounds: Normal breath sounds. No stridor. No wheezing, rhonchi or rales. Chest:      Chest wall: No tenderness. Genitourinary:     Labia:         Right: Tenderness present. Left: Tenderness present. Vagina: Vaginal discharge (White, string-like discharge, scant) present. Musculoskeletal:      Cervical back: Normal range of motion and neck supple. No rigidity. Neurological:      General: No focal deficit present. Mental Status: She is alert and oriented to person, place, and time. Sensory: No sensory deficit.       Gait: Gait normal.      Deep associated with type 2 diabetes mellitus ([facility] [address]  Comments: Will improve BG control first and reconsider tx options at that time, pt may trial topical meds chris   Orders:  -     Handicap Placard MISC; Starting Tue 10/25/2022, Disp-1 each, R-0, PrintExpiration 10/25/2027  [facility]. Kaity Higuera  8. S/P surgical manipulation of ankle joint  Comments:  Script for Handicap placard given for 5 years from today, pt likely to benefit from PT still   Orders:  -     Handicap Placard MISC; Starting Tue 10/25/2022, Disp-1 each, R-0, PrintExpiration 10/25/2027  [facility]. Kaity Higuera  9. Presence of retained hardware  -     Handicap Placard MISC; Starting Tue 10/25/2022, Disp-1 each, R-0, PrintExpiration 10/25/2027  [facility]  10. Need for vaccination for H flu type B  -     Influenza, FLUARIX, (age 10 mo+),  IM, Preservative Free, 0.5 mL     Patient declines changing her blood pressure regimen at this time, she understands the risk of uncontrolled high blood pressure especially in diabetes including but not limited to MI, CVA, perm disability or worse. She wishes to address her by blood pressure at another time when she is not in pain. We will also address her smoking at that next visit as well. Confirmed correct dose w/ [facility] on Diflucan at 1:20 PM.      Return in about 2 weeks (around 11/8/2022) for Follow up Diabetes and Food log .     Electronically signed by Courtney Wilson MD on 10/25/2022 at 1:23 PM

## 2022-11-04 DIAGNOSIS — E78.2 MIXED HYPERLIPIDEMIA: ICD-10-CM

## 2022-11-04 DIAGNOSIS — E06.3 HYPOTHYROIDISM DUE TO HASHIMOTO'S THYROIDITIS: ICD-10-CM

## 2022-11-04 DIAGNOSIS — I10 ESSENTIAL HYPERTENSION: ICD-10-CM

## 2022-11-04 DIAGNOSIS — E03.8 HYPOTHYROIDISM DUE TO HASHIMOTO'S THYROIDITIS: ICD-10-CM

## 2022-11-04 RX ORDER — LEVOTHYROXINE SODIUM 0.1 MG/1
100 TABLET ORAL DAILY
Qty: 90 TABLET | Refills: 3 | Status: SHIPPED | OUTPATIENT
Start: 2022-11-04 | End: 2023-10-30

## 2022-11-04 RX ORDER — AMLODIPINE BESYLATE 5 MG/1
5 TABLET ORAL DAILY
Qty: 90 TABLET | Refills: 1 | Status: SHIPPED | OUTPATIENT
Start: 2022-11-04 | End: 2023-02-02

## 2022-11-04 RX ORDER — ATORVASTATIN CALCIUM 20 MG/1
20 TABLET, FILM COATED ORAL DAILY
Qty: 90 TABLET | Refills: 3 | Status: SHIPPED | OUTPATIENT
Start: 2022-11-04 | End: 2023-10-30

## 2022-11-04 NOTE — TELEPHONE ENCOUNTER
Last visit:   Last Med refill:   Does patient have enough medication for 72 hours: No:     Next Visit Date:  Future Appointments   Date Time Provider Ned Danica   11/28/2022  9:00 AM Donna Rios MD [facility]   12/12/2022  3:30 PM Jose Fernandez DO [address] [place] [address] Maintenance   Topic Date Due    COVID-19 Vaccine (1) Never done    Diabetic retinal exam  Never done    Colorectal Cancer Screen  Never done    Breast cancer screen  Never done    Shingles vaccine (1 of 2) Never done    Low dose CT lung screening  Never done    DEXA (modify frequency per FRAX score)  Never done    Diabetic microalbuminuria test  04/23/2022    Lipids  11/17/2022    A1C test (Diabetic or Prediabetic)  01/25/2023    Depression Monitoring  10/04/2023    Annual Wellness Visit (AWV)  10/05/2023    Diabetic foot exam  10/25/2023    DTaP/Tdap/Td vaccine (2 - Td or Tdap) 10/02/2030    Flu vaccine  Completed    Pneumococcal 65+ years Vaccine  Completed    Hepatitis C screen  Completed    Hepatitis A vaccine  Aged Out    Hib vaccine  Aged Out    Meningococcal (ACWY) vaccine  Aged Out       Hemoglobin A1C (%)   Date Value   10/25/2022 10.8   01/03/2022 7.1   11/17/2021 7.9 (H)   11/17/2021 7.9 (H)             ( goal A1C is < 7)   Microalb/Crt.  Ratio (mcg/mg creat)   Date Value   04/23/2021 83 (H)     LDL Cholesterol (mg/dL)   Date Value   11/17/2021 62   09/17/2020            (goal LDL is <100)   BUN (mg/dL)   Date Value   11/17/2021 14     BP Readings from Last 3 Encounters:   10/25/22 (!) 159/100   10/10/22 139/89   10/04/22 131/84          (goal 120/80)    All Future Testing planned in [facility]  Lab Frequency Next Occurrence   BYRON DIGITAL SCREEN W OR WO CAD BILATERAL Once 02/02/2023   DEXA BONE DENSITY AXIAL SKELETON Once 02/02/2023   Full PFT Study With Bronchodilator Once 10/11/2022               Patient Active Problem List:     Dysuria     Urinary tract infection with hematuria     Type 2 diabetes mellitus     Major depressive disorder, recurrent, mild     Major depressive disorder, recurrent, moderate     Major depressive disorder, recurrent, unspecified     Acute cystitis without hematuria           Please address the medication refill and close the encounter. If I can be of assistance, please route to the applicable pool. Thank you.

## 2022-11-10 ENCOUNTER — CLINICAL DOCUMENTATION (OUTPATIENT)
Dept: SPIRITUAL SERVICES | Age: 67
End: 2022-11-10

## 2022-11-10 NOTE — ACP (ADVANCE CARE PLANNING)
Advance Care Planning   Ambulatory ACP Specialist Patient Outreach    Date:  11/10/2022  ACP Specialist:  Dimitri Monteiro    Outreach call to patient in follow-up to ACP Specialist referral from: Peggy Garcia DO    [x] PCP  [] Provider   [] Ambulatory Care Management [] Other for Reason:    [x] Advance Directive Assistance  [] Code Status Discussion  [] Complete Portable DNR Order  [] Discuss Goals of Care  [] Complete POST/MOST  [] Early ACP Decision-Making  [] Other    Date Referral Received: 10/4/22    Today's Outreach:  [] First   [] Second  [x] Third                               Third outreach made by [x]  phone  [x] email [x]   [employer]     Intervention:  [] Spoke with Patient  [x] Left VM requesting return call      Outcome: Third Outreach. Left detailed VM for Patient to return call. Sent Closure Letter via Email w/ACP Packet included. [employer]. Next Step:   [] ACP scheduled conversation  [] Outreach again in one week               [x] Email / Mail ACP Info Sheets  [x] Email / Mail Advance Directive            [x] Close Referral. Routing closure to referring provider/staff and to ACP Specialist . [x] Closure Letter mailed to Patient with Invitation to Contact ACP Specialist if/when ready.     Thank you for this referral.

## 2022-11-28 ENCOUNTER — TELEPHONE (OUTPATIENT)
Dept: FAMILY MEDICINE CLINIC | Age: 67
End: 2022-11-28

## 2022-11-28 NOTE — TELEPHONE ENCOUNTER
Call patient regarding missed appointment today. Confirmed patient identity with 2 separate identifiers. Patient states she is currently on her lunch break at [place] and did not make the appointment today she is usually working today. Patient states that her blood sugars are much improved with her last blood sugar at 120 and she states that usually her blood sugars have been in the low 100s since the increase in metformin and glipizide. Patient also states that her  infection has resolved as well. She states that she is started develop a bit of a chest cold. She also states she would like to follow-up with us in the clinic regarding her diabetes and possibly even the chest cold. Patient notified that she may need to do a virtual visit otherwise patient states that she will call back later today to schedule a follow-up visit. All questions answered in layman's terms and patient agrees with the above plan.

## 2023-01-20 DIAGNOSIS — F32.A DEPRESSION, UNSPECIFIED DEPRESSION TYPE: ICD-10-CM

## 2023-01-20 DIAGNOSIS — I10 ESSENTIAL HYPERTENSION: ICD-10-CM

## 2023-01-23 ENCOUNTER — HOSPITAL ENCOUNTER (OUTPATIENT)
Age: 68
Setting detail: SPECIMEN
Discharge: HOME | End: 2023-01-23

## 2023-01-23 ENCOUNTER — OFFICE VISIT (OUTPATIENT)
Dept: FAMILY MEDICINE CLINIC | Age: 68
End: 2023-01-23
Payer: MEDICARE

## 2023-01-23 VITALS
TEMPERATURE: 97.9 F | WEIGHT: 168.8 LBS | HEART RATE: 71 BPM | SYSTOLIC BLOOD PRESSURE: 135 MMHG | BODY MASS INDEX: 31.06 KG/M2 | HEIGHT: 62 IN | DIASTOLIC BLOOD PRESSURE: 82 MMHG

## 2023-01-23 DIAGNOSIS — Z13.220 SCREENING FOR HYPERLIPIDEMIA: ICD-10-CM

## 2023-01-23 DIAGNOSIS — Z78.0 POST-MENOPAUSAL: ICD-10-CM

## 2023-01-23 DIAGNOSIS — L72.3 SEBACEOUS CYST: ICD-10-CM

## 2023-01-23 DIAGNOSIS — R73.9 HYPERGLYCEMIA: ICD-10-CM

## 2023-01-23 DIAGNOSIS — Z12.31 ENCOUNTER FOR SCREENING MAMMOGRAM FOR BREAST CANCER: ICD-10-CM

## 2023-01-23 DIAGNOSIS — E11.65 UNCONTROLLED TYPE 2 DIABETES MELLITUS WITH HYPERGLYCEMIA (HCC): ICD-10-CM

## 2023-01-23 DIAGNOSIS — E11.65 UNCONTROLLED TYPE 2 DIABETES MELLITUS WITH HYPERGLYCEMIA (HCC): Primary | ICD-10-CM

## 2023-01-23 DIAGNOSIS — I10 ESSENTIAL HYPERTENSION: ICD-10-CM

## 2023-01-23 DIAGNOSIS — E11.9 TYPE 2 DIABETES MELLITUS WITHOUT COMPLICATION, WITHOUT LONG-TERM CURRENT USE OF INSULIN (HCC): ICD-10-CM

## 2023-01-23 DIAGNOSIS — E08.00 DIABETES MELLITUS DUE TO UNDERLYING CONDITION WITH HYPEROSMOLARITY WITHOUT COMA, UNSPECIFIED WHETHER LONG TERM INSULIN USE (HCC): ICD-10-CM

## 2023-01-23 LAB
CREATININE URINE: 123.7 MG/DL (ref 28–217)
HBA1C MFR BLD: 9.9 %
MICROALBUMIN/CREAT 24H UR: 288 MG/L
MICROALBUMIN/CREAT UR-RTO: 233 MCG/MG CREAT

## 2023-01-23 PROCEDURE — 99211 OFF/OP EST MAY X REQ PHY/QHP: CPT | Performed by: FAMILY MEDICINE

## 2023-01-23 PROCEDURE — 83036 HEMOGLOBIN GLYCOSYLATED A1C: CPT | Performed by: FAMILY MEDICINE

## 2023-01-23 RX ORDER — METFORMIN HYDROCHLORIDE 500 MG/1
1000 TABLET, EXTENDED RELEASE ORAL 2 TIMES DAILY
Qty: 120 TABLET | Refills: 5 | Status: SHIPPED | OUTPATIENT
Start: 2023-01-23 | End: 2023-07-22

## 2023-01-23 RX ORDER — LISINOPRIL 20 MG/1
20 TABLET ORAL DAILY
Qty: 90 TABLET | Refills: 3 | Status: SHIPPED | OUTPATIENT
Start: 2023-01-23

## 2023-01-23 RX ORDER — CITALOPRAM 40 MG/1
40 TABLET ORAL DAILY
Qty: 90 TABLET | Refills: 3 | Status: SHIPPED | OUTPATIENT
Start: 2023-01-23 | End: 2024-01-18

## 2023-01-23 RX ORDER — ASPIRIN 81 MG/1
81 TABLET ORAL DAILY
Qty: 90 TABLET | Refills: 3 | Status: SHIPPED | OUTPATIENT
Start: 2023-01-23

## 2023-01-23 ASSESSMENT — PATIENT HEALTH QUESTIONNAIRE - PHQ9
5. POOR APPETITE OR OVEREATING: 0
1. LITTLE INTEREST OR PLEASURE IN DOING THINGS: 0
SUM OF ALL RESPONSES TO PHQ9 QUESTIONS 1 & 2: 1
4. FEELING TIRED OR HAVING LITTLE ENERGY: 3
7. TROUBLE CONCENTRATING ON THINGS, SUCH AS READING THE NEWSPAPER OR WATCHING TELEVISION: 1
8. MOVING OR SPEAKING SO SLOWLY THAT OTHER PEOPLE COULD HAVE NOTICED. OR THE OPPOSITE, BEING SO FIGETY OR RESTLESS THAT YOU HAVE BEEN MOVING AROUND A LOT MORE THAN USUAL: 0
10. IF YOU CHECKED OFF ANY PROBLEMS, HOW DIFFICULT HAVE THESE PROBLEMS MADE IT FOR YOU TO DO YOUR WORK, TAKE CARE OF THINGS AT HOME, OR GET ALONG WITH OTHER PEOPLE: 0
9. THOUGHTS THAT YOU WOULD BE BETTER OFF DEAD, OR OF HURTING YOURSELF: 0
3. TROUBLE FALLING OR STAYING ASLEEP: 0
SUM OF ALL RESPONSES TO PHQ QUESTIONS 1-9: 5
6. FEELING BAD ABOUT YOURSELF - OR THAT YOU ARE A FAILURE OR HAVE LET YOURSELF OR YOUR FAMILY DOWN: 0
2. FEELING DOWN, DEPRESSED OR HOPELESS: 1
SUM OF ALL RESPONSES TO PHQ QUESTIONS 1-9: 5

## 2023-01-23 NOTE — PROGRESS NOTES
Check up  Discussed prior visit details with Dr. Sepideh Silva  40 hours weekly at [facility]  (9856-0735 hrs)  Chart reviewed  Update all meds  [facility]  Gene () - breathing problems        Negative for:     Worry / mood complaints  Headache  Dizziness  Visual Disturbance  Hearing Changes  Nasal / sinus Symptoms  Mouth / tooth symptom, pain  Throat pain  Difficulty swallowing  Neck pain  Chest discomfort  Cough  SOB  N/V/D/C  Pelvic area discomfort  Bladder / voiding discomfort  Bowel complaints  MS complaints   Numbness/tingling/abnormal sensations   Edema / Leg swelling  Dizziness  Fatigue  Bleeding   Skin    Pertinent Pos: See HPI - above      Component Ref Range & Units 1/3/22 1613 11/17/21 1058     Hemoglobin A1C % 7.1  () 7.9 High  R  7.9 High                High weight 185 Lbs - 08-                      168 Lbs - 01-    Home glucose levels - 200      Alert and oriented to PPT  NAD    HEENT - neg  Neck - no bruits, no lymphadenopathy  Chest  HRRR w/o murmer  LCTAB no wheezes / rhonchi  Abdomen - soft, non-tender, BS  Extremities - 0+ PTE    Skin-2.5 to 3.0 cm contained inclusion/sebaceous cyst on right upper back near superior angle of scapula. Patient states she is interested in having this evaluated for removal.    Gait / Station - stable, no dysequilibrium, uniform pace, no assist device, cane. Diagnosis Orders   1. Uncontrolled type 2 diabetes mellitus with hyperglycemia (HCC)  POCT glycosylated hemoglobin (Hb A1C)    Microalbumin, Ur      2. Post-menopausal  DEXA Bone Density Axial Skeleton      3. Encounter for screening mammogram for breast cancer  BYRON Digital Screen Bilateral      4.  Screening for hyperlipidemia  Lipid Panel          Plan:  1.)  a1c today - 9.9     (Increase Glucophage to 1000 mg - twice daily)    2.)  med list updated  3.)  ankur 3 m - repeat a1c at next visit

## 2023-01-23 NOTE — TELEPHONE ENCOUNTER
E-scribe request for med refills. Please review and e-scribe if applicable. Last Visit Date:  10/25/22  Next Visit Date:  1/23/2023    Hemoglobin A1C (%)   Date Value   10/25/2022 10.8   01/03/2022 7.1   11/17/2021 7.9 (H)   11/17/2021 7.9 (H)             ( goal A1C is < 7)   Microalb/Crt.  Ratio (mcg/mg creat)   Date Value   04/23/2021 83 (H)     LDL Cholesterol (mg/dL)   Date Value   11/17/2021 62       (goal LDL is <100)   BUN (mg/dL)   Date Value   11/17/2021 14     BP Readings from Last 3 Encounters:   10/25/22 (!) 159/100   10/10/22 139/89   10/04/22 131/84          (goal 120/80)        Patient Active Problem List:     Dysuria     Urinary tract infection with hematuria     Type 2 diabetes mellitus     Major depressive disorder, recurrent, mild     Major depressive disorder, recurrent, moderate     Major depressive disorder, recurrent, unspecified     Acute cystitis without hematuria      ----Vani Anchors

## 2023-01-23 NOTE — PROGRESS NOTES
Diabetic visit information    BP Readings from Last 3 Encounters:   10/25/22 (!) 159/100   10/10/22 139/89   10/04/22 131/84       Hemoglobin A1C (%)   Date Value   10/25/2022 10.8   01/03/2022 7.1   11/17/2021 7.9 (H)   11/17/2021 7.9 (H)     Microalb/Crt. Ratio (mcg/mg creat)   Date Value   04/23/2021 83 (H)     LDL Cholesterol (mg/dL)   Date Value   11/17/2021 62               Have you changed or started any medications since your last visit including any over-the-counter medicines, vitamins, or herbal medicines? no   Have you stopped taking any of your medications? Is so, why? - yes see list  Are you having any side effects from any of your medications? - no    Have you seen any other physician or provider since your last visit?  no   Have you had any other diagnostic tests since your last visit?  no   Have you been seen in the emergency room and/or had an admission in a hospital since we last saw you?  no     Have you had your annual diabetic retinal (eye) exam? Yes   (ensure copy of exam is in the chart)    Have you had your routine dental cleaning in the past 6 months? no    Do you have an active [employer] account? If not, what are your barriers? Yes    Patient Care Team:  Jordon Estrada DO as PCP - General (Family Medicine)  Jordon Estrada DO as PCP - [facility] Provider    Medical history Review  Past Medical, Family, and Social History reviewed and does not contribute to the patient presenting condition.     Health Maintenance   Topic Date Due    COVID-19 Vaccine (1) Never done    Diabetic retinal exam  Never done    Colorectal Cancer Screen  Never done    Breast cancer screen  Never done    Shingles vaccine (1 of 2) Never done    Low dose CT lung screening  Never done    DEXA (modify frequency per FRAX score)  Never done    Diabetic Alb to Cr ratio (uACR) test  04/23/2022    Lipids  11/17/2022    GFR test (Diabetes, CKD 3-4, OR last GFR 15-59)  11/17/2022    A1C test (Diabetic or Prediabetic)  01/25/2023    Depression Monitoring  10/04/2023    Annual Wellness Visit (AWV)  10/05/2023    Diabetic foot exam  10/25/2023    DTaP/Tdap/Td vaccine (2 - Td or Tdap) 10/02/2030    Flu vaccine  Completed    Pneumococcal 65+ years Vaccine  Completed    Hepatitis C screen  Completed    Hepatitis A vaccine  Aged Out    Hib vaccine  Aged Out    Meningococcal (ACWY) vaccine  Aged Out

## 2023-01-23 NOTE — PATIENT INSTRUCTIONS
Thank you for letting us take care of you today. We hope all your questions were addressed. If a question was overlooked or something else comes to mind after you return home, please contact a member of your Care Team listed below. Your Care Team at [place] [address] is Team #2  Gadiel Moreno DO (Faculty)  Naheed Cronin (Faculty)  Mandi Watson MD (Resident)  Elen Watts MD (Resident)  Nayeli Ortega MD (Resident)  Manpreet Soler MD (Resident)  Govind Anand., BEN Colvin.,  THOMAS Fish., IRENEN  Issa Valdes., [facility] office)  Chika Hogue, [address] (Clinical Practice Manager)  Rajat Yoo [facility] (Clinical Pharmacist)     Office phone number: [phone]    If you need to get in right away due to illness, please be advised we have \"Same Day\" appointments available Monday-Friday. Please call us at [phone] option #3 to schedule your \"Same Day\" appointment.

## 2023-01-24 LAB
CHOLESTEROL/HDL RATIO: 4.7
CHOLESTEROL: 185 MG/DL
HDLC SERPL-MCNC: 39 MG/DL
LDL CHOLESTEROL: 68 MG/DL (ref 0–130)
TRIGL SERPL-MCNC: 392 MG/DL

## 2023-02-21 DIAGNOSIS — E11.65 UNCONTROLLED TYPE 2 DIABETES MELLITUS WITH HYPERGLYCEMIA (HCC): ICD-10-CM

## 2023-02-22 RX ORDER — GLIPIZIDE 10 MG/1
10 TABLET ORAL 2 TIMES DAILY
Qty: 60 TABLET | Refills: 3 | Status: SHIPPED | OUTPATIENT
Start: 2023-02-22

## 2023-02-22 NOTE — TELEPHONE ENCOUNTER
Last visit:   Last Med refill:   Does patient have enough medication for 72 hours: No:     Next Visit Date:  No future appointments. Health Maintenance   Topic Date Due    COVID-19 Vaccine (1) Never done    Diabetic retinal exam  Never done    Colorectal Cancer Screen  Never done    Breast cancer screen  Never done    Shingles vaccine (1 of 2) Never done    Low dose CT lung screening  Never done    DEXA (modify frequency per FRAX score)  Never done    GFR test (Diabetes, CKD 3-4, OR last GFR 15-59)  11/17/2022    A1C test (Diabetic or Prediabetic)  04/23/2023    Annual Wellness Visit (AWV)  10/05/2023    Diabetic foot exam  10/25/2023    Diabetic Alb to Cr ratio (uACR) test  01/23/2024    Lipids  01/23/2024    Depression Monitoring  01/23/2024    DTaP/Tdap/Td vaccine (2 - Td or Tdap) 10/02/2030    Flu vaccine  Completed    Pneumococcal 65+ years Vaccine  Completed    Hepatitis C screen  Completed    Hepatitis A vaccine  Aged Out    Hib vaccine  Aged Out    Meningococcal (ACWY) vaccine  Aged Out       Hemoglobin A1C (%)   Date Value   01/23/2023 9.9   10/25/2022 10.8   01/03/2022 7.1             ( goal A1C is < 7)   Microalb/Crt.  Ratio (mcg/mg creat)   Date Value   01/23/2023 233 (H)     LDL Cholesterol (mg/dL)   Date Value   01/23/2023 68   11/17/2021 62       (goal LDL is <100)   BUN (mg/dL)   Date Value   11/17/2021 14     BP Readings from Last 3 Encounters:   01/23/23 135/82   10/25/22 (!) 159/100   10/10/22 139/89          (goal 120/80)    All Future Testing planned in [facility]  Lab Frequency Next Occurrence   Full PFT Study With Bronchodilator Once 10/11/2022   DEXA Bone Density Axial Skeleton Once 02/23/2023   BYRON Digital Screen Bilateral Once 01/23/2023               Patient Active Problem List:     Dysuria     Urinary tract infection with hematuria     Type 2 diabetes mellitus     Major depressive disorder, recurrent, mild     Major depressive disorder, recurrent, moderate     Major depressive disorder, recurrent, unspecified     Acute cystitis without hematuria           Please address the medication refill and close the encounter. If I can be of assistance, please route to the applicable pool. Thank you.

## 2023-02-27 ENCOUNTER — TELEPHONE (OUTPATIENT)
Dept: FAMILY MEDICINE CLINIC | Age: 68
End: 2023-02-27

## 2023-05-09 ENCOUNTER — OFFICE VISIT (OUTPATIENT)
Dept: FAMILY MEDICINE CLINIC | Age: 68
End: 2023-05-09
Payer: MEDICARE

## 2023-05-09 VITALS
BODY MASS INDEX: 30.07 KG/M2 | SYSTOLIC BLOOD PRESSURE: 123 MMHG | HEART RATE: 84 BPM | HEIGHT: 62 IN | DIASTOLIC BLOOD PRESSURE: 73 MMHG | WEIGHT: 163.4 LBS

## 2023-05-09 DIAGNOSIS — L72.3 SEBACEOUS CYST: ICD-10-CM

## 2023-05-09 DIAGNOSIS — E11.65 UNCONTROLLED TYPE 2 DIABETES MELLITUS WITH HYPERGLYCEMIA (HCC): Primary | ICD-10-CM

## 2023-05-09 DIAGNOSIS — Z12.11 COLON CANCER SCREENING: ICD-10-CM

## 2023-05-09 DIAGNOSIS — Z00.00 HEALTHCARE MAINTENANCE: ICD-10-CM

## 2023-05-09 DIAGNOSIS — F17.200 SMOKER: ICD-10-CM

## 2023-05-09 PROCEDURE — 99213 OFFICE O/P EST LOW 20 MIN: CPT

## 2023-05-09 PROCEDURE — 3052F HG A1C>EQUAL 8.0%<EQUAL 9.0%: CPT

## 2023-05-09 PROCEDURE — 1123F ACP DISCUSS/DSCN MKR DOCD: CPT

## 2023-05-09 PROCEDURE — 83036 HEMOGLOBIN GLYCOSYLATED A1C: CPT

## 2023-05-09 SDOH — ECONOMIC STABILITY: FOOD INSECURITY: WITHIN THE PAST 12 MONTHS, YOU WORRIED THAT YOUR FOOD WOULD RUN OUT BEFORE YOU GOT MONEY TO BUY MORE.: NEVER TRUE

## 2023-05-09 SDOH — ECONOMIC STABILITY: FOOD INSECURITY: WITHIN THE PAST 12 MONTHS, THE FOOD YOU BOUGHT JUST DIDN'T LAST AND YOU DIDN'T HAVE MONEY TO GET MORE.: NEVER TRUE

## 2023-05-09 SDOH — ECONOMIC STABILITY: HOUSING INSECURITY
IN THE LAST 12 MONTHS, WAS THERE A TIME WHEN YOU DID NOT HAVE A STEADY PLACE TO SLEEP OR SLEPT IN A SHELTER (INCLUDING NOW)?: NO

## 2023-05-09 SDOH — ECONOMIC STABILITY: INCOME INSECURITY: HOW HARD IS IT FOR YOU TO PAY FOR THE VERY BASICS LIKE FOOD, HOUSING, MEDICAL CARE, AND HEATING?: NOT HARD AT ALL

## 2023-05-09 ASSESSMENT — ENCOUNTER SYMPTOMS
SHORTNESS OF BREATH: 0
VOMITING: 0
NAUSEA: 0
CONSTIPATION: 0
DIARRHEA: 0
RHINORRHEA: 0
ABDOMINAL PAIN: 0
SORE THROAT: 0

## 2023-05-10 ENCOUNTER — OFFICE VISIT (OUTPATIENT)
Dept: SURGERY | Age: 68
End: 2023-05-10
Payer: MEDICARE

## 2023-05-10 VITALS
HEIGHT: 62 IN | DIASTOLIC BLOOD PRESSURE: 85 MMHG | WEIGHT: 163.4 LBS | BODY MASS INDEX: 30.07 KG/M2 | SYSTOLIC BLOOD PRESSURE: 138 MMHG | HEART RATE: 70 BPM

## 2023-05-10 DIAGNOSIS — L72.3 SEBACEOUS CYST: Primary | ICD-10-CM

## 2023-05-10 LAB — HBA1C MFR BLD: 8.1 %

## 2023-05-10 PROCEDURE — 3078F DIAST BP <80 MM HG: CPT | Performed by: SURGERY

## 2023-05-10 PROCEDURE — 99204 OFFICE O/P NEW MOD 45 MIN: CPT | Performed by: SURGERY

## 2023-05-10 PROCEDURE — 3074F SYST BP LT 130 MM HG: CPT | Performed by: SURGERY

## 2023-05-10 PROCEDURE — 1123F ACP DISCUSS/DSCN MKR DOCD: CPT | Performed by: SURGERY

## 2023-05-17 ENCOUNTER — HOSPITAL ENCOUNTER (OUTPATIENT)
Age: 68
Discharge: HOME | End: 2023-05-19
Payer: MEDICARE

## 2023-05-17 ENCOUNTER — OFFICE VISIT (OUTPATIENT)
Dept: FAMILY MEDICINE CLINIC | Age: 68
End: 2023-05-17
Payer: MEDICARE

## 2023-05-17 ENCOUNTER — HOSPITAL ENCOUNTER (OUTPATIENT)
Dept: GENERAL RADIOLOGY | Age: 68
Discharge: HOME | End: 2023-05-19
Payer: MEDICARE

## 2023-05-17 VITALS
BODY MASS INDEX: 30.77 KG/M2 | DIASTOLIC BLOOD PRESSURE: 82 MMHG | WEIGHT: 167.2 LBS | SYSTOLIC BLOOD PRESSURE: 135 MMHG | HEART RATE: 84 BPM | HEIGHT: 62 IN
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
## 2023-05-17 DIAGNOSIS — R42 VERTIGO: Primary | ICD-10-CM

## 2023-05-17 DIAGNOSIS — Z12.31 ENCOUNTER FOR SCREENING MAMMOGRAM FOR BREAST CANCER: ICD-10-CM

## 2023-05-17 DIAGNOSIS — R07.81 RIB PAIN ON LEFT SIDE: ICD-10-CM

## 2023-05-17 DIAGNOSIS — Z78.0 POST-MENOPAUSAL: ICD-10-CM

## 2023-05-17 DIAGNOSIS — Z12.11 SCREEN FOR COLON CANCER: ICD-10-CM

## 2023-05-17 PROCEDURE — 1123F ACP DISCUSS/DSCN MKR DOCD: CPT | Performed by: STUDENT IN AN ORGANIZED HEALTH CARE EDUCATION/TRAINING PROGRAM

## 2023-05-17 PROCEDURE — 99213 OFFICE O/P EST LOW 20 MIN: CPT | Performed by: STUDENT IN AN ORGANIZED HEALTH CARE EDUCATION/TRAINING PROGRAM

## 2023-05-17 PROCEDURE — 71101 X-RAY EXAM UNILAT RIBS/CHEST: CPT

## 2023-05-17 PROCEDURE — 99211 OFF/OP EST MAY X REQ PHY/QHP: CPT | Performed by: FAMILY MEDICINE

## 2023-05-17 RX ORDER — MECLIZINE HYDROCHLORIDE 25 MG/1
25 TABLET ORAL 3 TIMES DAILY PRN
Qty: 30 TABLET | Refills: 2 | Status: SHIPPED | OUTPATIENT
Start: 2023-05-17

## 2023-05-17 RX ORDER — MECLIZINE HYDROCHLORIDE 25 MG/1
TABLET ORAL
COMMUNITY
Start: 2023-05-15 | End: 2023-05-17 | Stop reason: SDUPTHER

## 2023-05-17 RX ORDER — IBUPROFEN 600 MG/1
600 TABLET ORAL 3 TIMES DAILY PRN
Qty: 90 TABLET | Refills: 0 | Status: SHIPPED | OUTPATIENT
Start: 2023-05-17

## 2023-05-17 NOTE — PROGRESS NOTES
Attending Physician Statement  I have discussed the care of [address] [place] [address] pertinent history and exam findings,  with the resident. I have reviewed the key elements of all parts of the encounter with the resident. I agree with the assessment, plan and orders as documented by the resident. (GE Modifier)    1. Post-menopausal  - DEXA Bone Density Axial Skeleton; Future    2. Vertigo    - meclizine 25 MG  - [facility], Neurology, [employer]  - DME Order for [facility] as OP    3. Rib pain on left side  - ibuprofen 600 MG t- XR RIBS LEFT INCLUDE CHEST (  4. Fall, initial encounter  - ibuprofenMOTRIN  - DME Order for Walker as OP  - XR RIBS LEFT INCLUDE CHEST     5.  - ordered
Discontinued    HIV screen  Discontinued
patient questions answered. Pt voiced understanding. Disclaimer: Some orall of this note was transcribed using voice-recognition software. This may cause typographical errors occasionally. Although all effort is made to fix these errors, please do not hesitate to contact our office if there Echo Toro concern with the understanding of this note.     Sly Roberts MD  Family Medicine PGY-3  05/17/23 at 4:40 PM

## 2023-05-18 ENCOUNTER — TELEPHONE (OUTPATIENT)
Dept: FAMILY MEDICINE CLINIC | Age: 68
End: 2023-05-18

## 2023-05-18 DIAGNOSIS — R42 VERTIGO: Primary | ICD-10-CM

## 2023-05-18 DIAGNOSIS — R07.81 RIB PAIN ON LEFT SIDE: ICD-10-CM

## 2023-05-18 RX ORDER — LIDOCAINE 50 MG/G
1 PATCH TOPICAL DAILY
Qty: 30 PATCH | Refills: 0 | Status: SHIPPED | OUTPATIENT
Start: 2023-05-18 | End: 2023-06-17

## 2023-05-18 RX ORDER — ONDANSETRON 4 MG/1
4 TABLET, ORALLY DISINTEGRATING ORAL 3 TIMES DAILY PRN
Qty: 21 TABLET | Refills: 0 | Status: SHIPPED | OUTPATIENT
Start: 2023-05-18

## 2023-05-18 NOTE — TELEPHONE ENCOUNTER
I called her and prescribed nausea pills. Also advised her to do MRI of brain. She was understanding.

## 2023-05-18 NOTE — TELEPHONE ENCOUNTER
Chronic vertigo now worsening with multiple falls, patient also complaining of nausea. Has failed vestibular rehab in the past.  Discussed with the patient and advised MRI brain and to follow-up with neurology.

## 2023-05-18 NOTE — TELEPHONE ENCOUNTER
Patient called and would like a call back regarding her xray results. She is also requesting something for nausea.  Please advise

## 2023-05-23 ENCOUNTER — TELEPHONE (OUTPATIENT)
Dept: FAMILY MEDICINE CLINIC | Age: 68
End: 2023-05-23

## 2023-05-24 ENCOUNTER — TELEPHONE (OUTPATIENT)
Dept: FAMILY MEDICINE CLINIC | Age: 68
End: 2023-05-24

## 2023-05-31 ENCOUNTER — OFFICE VISIT (OUTPATIENT)
Dept: FAMILY MEDICINE CLINIC | Age: 68
End: 2023-05-31
Payer: MEDICARE

## 2023-05-31 VITALS
WEIGHT: 165.6 LBS | BODY MASS INDEX: 30.47 KG/M2 | HEIGHT: 62 IN | SYSTOLIC BLOOD PRESSURE: 121 MMHG | DIASTOLIC BLOOD PRESSURE: 84 MMHG | HEART RATE: 81 BPM

## 2023-05-31 DIAGNOSIS — R42 VERTIGO: Primary | ICD-10-CM

## 2023-05-31 PROCEDURE — 99213 OFFICE O/P EST LOW 20 MIN: CPT | Performed by: STUDENT IN AN ORGANIZED HEALTH CARE EDUCATION/TRAINING PROGRAM

## 2023-05-31 PROCEDURE — 1123F ACP DISCUSS/DSCN MKR DOCD: CPT | Performed by: STUDENT IN AN ORGANIZED HEALTH CARE EDUCATION/TRAINING PROGRAM

## 2023-06-01 ENCOUNTER — TELEPHONE (OUTPATIENT)
Dept: FAMILY MEDICINE CLINIC | Age: 68
End: 2023-06-01

## 2023-06-05 NOTE — TELEPHONE ENCOUNTER
I fixed the date on letter. FMLA is filled correctly as per my assessment. Please update patient.   Thank you

## 2023-06-06 ENCOUNTER — HOSPITAL ENCOUNTER (OUTPATIENT)
Age: 68
Setting detail: SPECIMEN
Discharge: HOME | End: 2023-06-06

## 2023-06-06 DIAGNOSIS — E03.9 HYPOTHYROIDISM, UNSPECIFIED TYPE: ICD-10-CM

## 2023-06-06 PROBLEM — I10 ESSENTIAL HYPERTENSION: Status: ACTIVE | Noted: 2023-06-06

## 2023-06-06 PROBLEM — I10 HYPERTENSION: Status: ACTIVE | Noted: 2023-06-06

## 2023-06-06 LAB
T4 FREE SERPL-MCNC: 1.5 NG/DL (ref 0.9–1.7)
TSH SERPL-MCNC: 1.49 UIU/ML (ref 0.3–5)
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
## 2023-06-22 ENCOUNTER — TELEPHONE (OUTPATIENT)
Dept: FAMILY MEDICINE CLINIC | Age: 68
End: 2023-06-22

## 2023-06-22 NOTE — TELEPHONE ENCOUNTER
Dme for walker was sent to [phone] [address] Mancia spoke to patient and updated with contact number of [phone]. Writer faxed to [phone].

## 2023-06-27 ENCOUNTER — HOSPITAL ENCOUNTER (OUTPATIENT)
Dept: WOMENS IMAGING | Age: 68
Discharge: HOME | End: 2023-06-29
Payer: MEDICARE

## 2023-06-27 DIAGNOSIS — Z78.0 POST-MENOPAUSAL: ICD-10-CM

## 2023-06-27 DIAGNOSIS — Z12.31 ENCOUNTER FOR SCREENING MAMMOGRAM FOR BREAST CANCER: ICD-10-CM

## 2023-06-27 PROCEDURE — 77080 DXA BONE DENSITY AXIAL: CPT

## 2023-06-27 PROCEDURE — 77063 BREAST TOMOSYNTHESIS BI: CPT

## 2023-07-07 ENCOUNTER — HOSPITAL ENCOUNTER (EMERGENCY)
Age: 68
Discharge: HOME | End: 2023-07-07
Attending: EMERGENCY MEDICINE
Payer: MEDICARE

## 2023-07-07 VITALS
TEMPERATURE: 97.3 F | SYSTOLIC BLOOD PRESSURE: 149 MMHG | WEIGHT: 169.53 LBS | HEIGHT: 63 IN | BODY MASS INDEX: 30.04 KG/M2 | HEART RATE: 84 BPM | RESPIRATION RATE: 16 BRPM | DIASTOLIC BLOOD PRESSURE: 83 MMHG | OXYGEN SATURATION: 97 %

## 2023-07-07 DIAGNOSIS — S51.812A LACERATION OF LEFT FOREARM, INITIAL ENCOUNTER: Primary | ICD-10-CM

## 2023-07-07 PROCEDURE — 99284 EMERGENCY DEPT VISIT MOD MDM: CPT

## 2023-07-07 PROCEDURE — 12001 RPR S/N/AX/GEN/TRNK 2.5CM/<: CPT

## 2023-07-07 PROCEDURE — 6360000002 HC RX W HCPCS: Performed by: EMERGENCY MEDICINE

## 2023-07-07 PROCEDURE — 90715 TDAP VACCINE 7 YRS/> IM: CPT | Performed by: EMERGENCY MEDICINE

## 2023-07-07 PROCEDURE — 90471 IMMUNIZATION ADMIN: CPT | Performed by: EMERGENCY MEDICINE

## 2023-07-07 RX ADMIN — TETANUS TOXOID, REDUCED DIPHTHERIA TOXOID AND ACELLULAR PERTUSSIS VACCINE, ADSORBED 0.5 ML: 5; 2.5; 8; 8; 2.5 SUSPENSION INTRAMUSCULAR at 09:07

## 2023-07-07 ASSESSMENT — PAIN - FUNCTIONAL ASSESSMENT: PAIN_FUNCTIONAL_ASSESSMENT: NONE - DENIES PAIN

## 2023-07-07 ASSESSMENT — LIFESTYLE VARIABLES
HOW MANY STANDARD DRINKS CONTAINING ALCOHOL DO YOU HAVE ON A TYPICAL DAY: PATIENT DOES NOT DRINK
HOW OFTEN DO YOU HAVE A DRINK CONTAINING ALCOHOL: NEVER

## 2023-07-07 NOTE — ED TRIAGE NOTES
Patient presented to the ED ambulatory from Triage. Patient states that she was working at [employer] and cut her left forearm with a . Patient has an approximately one inch laceration that is well approximated. Patient has even and unlabored respirations, NAD, bed is in lowest position, and call light with in reach. Writer will continue with plan of care.

## 2023-07-14 ENCOUNTER — OFFICE VISIT (OUTPATIENT)
Dept: FAMILY MEDICINE CLINIC | Age: 68
End: 2023-07-14
Payer: MEDICARE

## 2023-07-14 ENCOUNTER — HOSPITAL ENCOUNTER (OUTPATIENT)
Age: 68
Setting detail: SPECIMEN
Discharge: HOME | End: 2023-07-14

## 2023-07-14 VITALS
BODY MASS INDEX: 29.48 KG/M2 | HEIGHT: 63 IN | HEART RATE: 82 BPM | DIASTOLIC BLOOD PRESSURE: 89 MMHG | SYSTOLIC BLOOD PRESSURE: 137 MMHG | WEIGHT: 166.4 LBS

## 2023-07-14 DIAGNOSIS — M81.8 OTHER OSTEOPOROSIS WITHOUT CURRENT PATHOLOGICAL FRACTURE: Primary | ICD-10-CM

## 2023-07-14 DIAGNOSIS — S51.812S FOREARM LACERATION, LEFT, SEQUELA: ICD-10-CM

## 2023-07-14 DIAGNOSIS — M81.8 OTHER OSTEOPOROSIS WITHOUT CURRENT PATHOLOGICAL FRACTURE: ICD-10-CM

## 2023-07-14 LAB — 25(OH)D3 SERPL-MCNC: 19.9 NG/ML

## 2023-07-14 PROCEDURE — 3075F SYST BP GE 130 - 139MM HG: CPT

## 2023-07-14 PROCEDURE — 1123F ACP DISCUSS/DSCN MKR DOCD: CPT

## 2023-07-14 PROCEDURE — 3079F DIAST BP 80-89 MM HG: CPT

## 2023-07-14 PROCEDURE — 99213 OFFICE O/P EST LOW 20 MIN: CPT

## 2023-07-14 RX ORDER — ALENDRONATE SODIUM 70 MG/1
70 TABLET ORAL
Qty: 12 TABLET | Refills: 1 | Status: SHIPPED | OUTPATIENT
Start: 2023-07-14

## 2023-07-14 ASSESSMENT — ENCOUNTER SYMPTOMS
SHORTNESS OF BREATH: 0
COUGH: 0
ABDOMINAL PAIN: 0

## 2023-07-14 NOTE — PATIENT INSTRUCTIONS
Thank you for letting us take care of you today. We hope all your questions were addressed. If a question was overlooked or something else comes to mind after you return home, please contact a member of your Care Team listed below. Your Care Team at [address] [place] [address] is Team #2  Arash Gallo M.D. (Faculty)  Conor Santiago, (Resident)  Joseph Callaway, (Resident)  Gennaro Curry, (Resident)  Noelle Bynum, (Resident)  Julito Brian, (Resident)  [facility], [address] [place] [address], [facility]  Tamra Rodriguez,  DIANDRA Parker, [facility]  Lucero Officer, DIANDRA Collazo, [facility]  Vaughn Gutierrez) [place], [place] ([address])  [facility] (Clinical Pharmacist)     Office phone number: [phone]    If you need to get in right away due to illness, please be advised we have \"Same Day\" appointments available Monday-Friday. Please call us at [phone] option #3 to schedule your \"Same Day\" appointment.

## 2023-07-14 NOTE — PROGRESS NOTES
Visit Information    Have you changed or started any medications since your last visit including any over-the-counter medicines, vitamins, or herbal medicines? no   Have you stopped taking any of your medications? Is so, why? -  no  Are you having any side effects from any of your medications? - no    Have you seen any other physician or provider since your last visit?  no   Have you had any other diagnostic tests since your last visit? yes - Labs, MRI, Mammo, DEXA   Have you been seen in the emergency room and/or had an admission in a hospital since we last saw you?  yes - [facility]   Have you had your routine dental cleaning in the past 6 months?  no     Do you have an active MyChart account? If no, what is the barrier?   Yes    Patient Care Team:  Demi Ontiveros DO as PCP - General (Family Medicine)  Demi Ontiveros DO as PCP - Empaneled Provider    Medical History Review  Past Medical, Family, and Social History reviewed and does not contribute to the patient presenting condition    Health Maintenance   Topic Date Due    COVID-19 Vaccine (1) Never done    Diabetic retinal exam  Never done    Colorectal Cancer Screen  Never done    Shingles vaccine (1 of 2) Never done    Low dose CT lung screening &/or counseling  Never done    GFR test (Diabetes, CKD 3-4, OR last GFR 15-59)  11/17/2022    Flu vaccine (1) 08/01/2023    Annual Wellness Visit (AWV)  10/05/2023    Diabetic foot exam  10/25/2023    Diabetic Alb to Cr ratio (uACR) test  01/23/2024    Lipids  01/23/2024    A1C test (Diabetic or Prediabetic)  05/09/2024    Depression Monitoring  06/06/2024    Breast cancer screen  06/27/2025    DTaP/Tdap/Td vaccine (3 - Td or Tdap) 07/07/2033    DEXA (modify frequency per FRAX score)  Completed    Pneumococcal 65+ years Vaccine  Completed    Hepatitis C screen  Completed    Hepatitis A vaccine  Aged Out    Hib vaccine  Aged Out    Meningococcal (ACWY) vaccine  Aged Out    Pneumococcal 0-64 years Vaccine

## 2023-07-17 DIAGNOSIS — E55.9 VITAMIN D DEFICIENCY: ICD-10-CM

## 2023-07-17 DIAGNOSIS — M81.8 OTHER OSTEOPOROSIS WITHOUT CURRENT PATHOLOGICAL FRACTURE: Primary | ICD-10-CM

## 2023-07-17 RX ORDER — ERGOCALCIFEROL 1.25 MG/1
50000 CAPSULE ORAL WEEKLY
Qty: 4 CAPSULE | Refills: 5 | Status: SHIPPED | OUTPATIENT
Start: 2023-07-17

## 2023-07-19 DIAGNOSIS — E11.9 TYPE 2 DIABETES MELLITUS WITHOUT COMPLICATION, WITHOUT LONG-TERM CURRENT USE OF INSULIN (HCC): ICD-10-CM

## 2023-07-19 DIAGNOSIS — I10 ESSENTIAL HYPERTENSION: ICD-10-CM

## 2023-07-19 DIAGNOSIS — R73.9 HYPERGLYCEMIA: ICD-10-CM

## 2023-07-19 RX ORDER — AMLODIPINE BESYLATE 5 MG/1
5 TABLET ORAL DAILY
Qty: 90 TABLET | Refills: 3 | Status: SHIPPED | OUTPATIENT
Start: 2023-07-19 | End: 2023-10-17

## 2023-07-19 RX ORDER — METFORMIN HYDROCHLORIDE 500 MG/1
1000 TABLET, EXTENDED RELEASE ORAL 2 TIMES DAILY
Qty: 120 TABLET | Refills: 2 | Status: SHIPPED | OUTPATIENT
Start: 2023-07-19 | End: 2024-01-15

## 2023-07-19 NOTE — TELEPHONE ENCOUNTER
E-scribe request for METFORMIN 500 MG. Please review and e-scribe if applicable. Last Visit Date:  7/14/2023  Next Visit Date:  7/28/2023    Hemoglobin A1C (%)   Date Value   05/09/2023 8.1   01/23/2023 9.9   10/25/2022 10.8             ( goal A1C is < 7)   Microalb/Crt.  Ratio (mcg/mg creat)   Date Value   01/23/2023 233 (H)     LDL Cholesterol (mg/dL)   Date Value   01/23/2023 68       (goal LDL is <100)   BUN (mg/dL)   Date Value   11/17/2021 14     BP Readings from Last 3 Encounters:   07/14/23 137/89   07/07/23 (!) 149/83   06/06/23 (!) 142/88          (goal 120/80)        Patient Active Problem List:     Dysuria     Urinary tract infection with hematuria     Type 2 diabetes mellitus     Major depressive disorder, recurrent, mild     Major depressive disorder, recurrent, moderate     Major depressive disorder, recurrent, unspecified     Acute cystitis without hematuria     Vertigo     Rib pain on left side     Hypothyroidism     Hypertension     Essential hypertension      ----JF

## 2023-07-19 NOTE — TELEPHONE ENCOUNTER
E-scribe request for norvasc 5 mg. Please review and e-scribe if applicable. Last Visit Date:  7/14/2023  Next Visit Date:  7/28/2023    Hemoglobin A1C (%)   Date Value   05/09/2023 8.1   01/23/2023 9.9   10/25/2022 10.8             ( goal A1C is < 7)   Microalb/Crt.  Ratio (mcg/mg creat)   Date Value   01/23/2023 233 (H)     LDL Cholesterol (mg/dL)   Date Value   01/23/2023 68       (goal LDL is <100)   BUN (mg/dL)   Date Value   11/17/2021 14     BP Readings from Last 3 Encounters:   07/14/23 137/89   07/07/23 (!) 149/83   06/06/23 (!) 142/88          (goal 120/80)        Patient Active Problem List:     Dysuria     Urinary tract infection with hematuria     Type 2 diabetes mellitus     Major depressive disorder, recurrent, mild     Major depressive disorder, recurrent, moderate     Major depressive disorder, recurrent, unspecified     Acute cystitis without hematuria     Vertigo     Rib pain on left side     Hypothyroidism     Hypertension     Essential hypertension      ----JF

## 2023-08-01 DIAGNOSIS — E11.65 UNCONTROLLED TYPE 2 DIABETES MELLITUS WITH HYPERGLYCEMIA (HCC): ICD-10-CM

## 2023-08-01 NOTE — TELEPHONE ENCOUNTER
Last visit: 07/14/2023  Last Med refill: 04/19/2023  Does patient have enough medication for 72 hours: No:     Next Visit Date:  No future appointments.     Health Maintenance   Topic Date Due    COVID-19 Vaccine (1) Never done    Diabetic retinal exam  Never done    Colorectal Cancer Screen  Never done    Shingles vaccine (1 of 2) Never done    Low dose CT lung screening &/or counseling  Never done    GFR test (Diabetes, CKD 3-4, OR last GFR 15-59)  11/17/2022    Flu vaccine (1) 08/01/2023    Annual Wellness Visit (AWV)  10/05/2023    Diabetic foot exam  10/25/2023    Diabetic Alb to Cr ratio (uACR) test  01/23/2024    Lipids  01/23/2024    A1C test (Diabetic or Prediabetic)  05/09/2024    Depression Monitoring  06/06/2024    Breast cancer screen  06/27/2025    DTaP/Tdap/Td vaccine (3 - Td or Tdap) 07/07/2033    DEXA (modify frequency per FRAX score)  Completed    Pneumococcal 65+ years Vaccine  Completed    Hepatitis C screen  Completed    Hepatitis A vaccine  Aged Out    Hib vaccine  Aged Out    Meningococcal (ACWY) vaccine  Aged Out    Pneumococcal 0-64 years Vaccine  Discontinued    Diabetes screen  Discontinued    HIV screen  Discontinued       Hemoglobin A1C (%)   Date Value   05/09/2023 8.1   01/23/2023 9.9   10/25/2022 10.8             ( goal A1C is < 7)   No components found for: LABMICR  LDL Cholesterol (mg/dL)   Date Value   01/23/2023 68   11/17/2021 62       (goal LDL is <100)   BUN (mg/dL)   Date Value   11/17/2021 14     BP Readings from Last 3 Encounters:   07/14/23 137/89   07/07/23 (!) 149/83   06/06/23 (!) 142/88          (goal 120/80)    All Future Testing planned in [facility]  Lab Frequency Next Occurrence   Full PFT Study With Bronchodilator Once 10/11/2022   DEXA Bone Density Axial Skeleton Once 02/23/2023   BYRON Digital Screen Bilateral Once 01/23/2023   Cardiac event monitor Once 05/31/2023               Patient Active Problem List:     Dysuria     Urinary tract infection with hematuria

## 2023-08-02 RX ORDER — GLIPIZIDE 10 MG/1
10 TABLET ORAL 2 TIMES DAILY
Qty: 60 TABLET | Refills: 3 | Status: SHIPPED | OUTPATIENT
Start: 2023-08-02

## 2023-09-27 DIAGNOSIS — E11.65 UNCONTROLLED TYPE 2 DIABETES MELLITUS WITH HYPERGLYCEMIA (HCC): ICD-10-CM

## 2023-09-27 RX ORDER — GLIPIZIDE 10 MG/1
10 TABLET ORAL 2 TIMES DAILY
Qty: 60 TABLET | Refills: 3 | Status: SHIPPED | OUTPATIENT
Start: 2023-09-27

## 2023-09-27 NOTE — TELEPHONE ENCOUNTER
Last visit:   Last Med refill:   Does patient have enough medication for 72 hours: No:     Next Visit Date:  No future appointments.     Health Maintenance   Topic Date Due    COVID-19 Vaccine (1) Never done    Diabetic retinal exam  Never done    Colorectal Cancer Screen  Never done    Shingles vaccine (1 of 2) Never done    Low dose CT lung screening &/or counseling  Never done    Hepatitis B vaccine (1 of 3 - Risk 3-dose series) Never done    Pneumococcal 65+ years Vaccine (2 - PCV) 10/02/2021    GFR test (Diabetes, CKD 3-4, OR last GFR 15-59)  11/17/2022    Flu vaccine (1) 08/01/2023    Annual Wellness Visit (AWV)  10/05/2023    Diabetic foot exam  10/25/2023    Diabetic Alb to Cr ratio (uACR) test  01/23/2024    Lipids  01/23/2024    A1C test (Diabetic or Prediabetic)  05/09/2024    Depression Monitoring  06/06/2024    Breast cancer screen  06/27/2025    DTaP/Tdap/Td vaccine (3 - Td or Tdap) 07/07/2033    DEXA (modify frequency per FRAX score)  Completed    Hepatitis C screen  Completed    Hepatitis A vaccine  Aged Out    Hib vaccine  Aged Out    Meningococcal (ACWY) vaccine  Aged Out    Pneumococcal 0-64 years Vaccine  Discontinued    Diabetes screen  Discontinued    HIV screen  Discontinued       Hemoglobin A1C (%)   Date Value   05/09/2023 8.1   01/23/2023 9.9   10/25/2022 10.8             ( goal A1C is < 7)   No components found for: \"LABMICR\"  LDL Cholesterol (mg/dL)   Date Value   01/23/2023 68   11/17/2021 62       (goal LDL is <100)   BUN (mg/dL)   Date Value   11/17/2021 14     BP Readings from Last 3 Encounters:   07/14/23 137/89   07/07/23 (!) 149/83   06/06/23 (!) 142/88          (goal 120/80)    All Future Testing planned in [facility]  Lab Frequency Next Occurrence   Full PFT Study With Bronchodilator Once 10/11/2022   DEXA Bone Density Axial Skeleton Once 02/23/2023   BYRON Digital Screen Bilateral Once 01/23/2023   Cardiac event monitor Once 05/31/2023               Patient Active Problem List:

## 2023-10-19 ENCOUNTER — TELEPHONE (OUTPATIENT)
Dept: FAMILY MEDICINE CLINIC | Age: 68
End: 2023-10-19

## 2023-11-04 DIAGNOSIS — E11.9 TYPE 2 DIABETES MELLITUS WITHOUT COMPLICATION, WITHOUT LONG-TERM CURRENT USE OF INSULIN (HCC): ICD-10-CM

## 2023-11-04 DIAGNOSIS — R73.9 HYPERGLYCEMIA: ICD-10-CM

## 2023-11-06 RX ORDER — METFORMIN HYDROCHLORIDE 500 MG/1
1000 TABLET, EXTENDED RELEASE ORAL 2 TIMES DAILY
Qty: 120 TABLET | Refills: 0 | Status: SHIPPED | OUTPATIENT
Start: 2023-11-06 | End: 2023-11-08 | Stop reason: SDUPTHER

## 2023-11-06 NOTE — TELEPHONE ENCOUNTER
Last visit: 7/14/23  Last Med refill: 7/19/23  Does patient have enough medication for 72 hours: No:     Next Visit Date:  No future appointments.     Health Maintenance   Topic Date Due    COVID-19 Vaccine (1) Never done    Diabetic retinal exam  Never done    Colorectal Cancer Screen  Never done    Shingles vaccine (1 of 2) Never done    Low dose CT lung screening &/or counseling  Never done    Hepatitis B vaccine (1 of 3 - Risk 3-dose series) Never done    Pneumococcal 65+ years Vaccine (2 - PCV) 10/02/2021    GFR test (Diabetes, CKD 3-4, OR last GFR 15-59)  11/17/2022    Flu vaccine (1) 08/01/2023    Annual Wellness Visit (AWV)  10/05/2023    Diabetic foot exam  10/25/2023    Diabetic Alb to Cr ratio (uACR) test  01/23/2024    Lipids  01/23/2024    A1C test (Diabetic or Prediabetic)  05/09/2024    Depression Monitoring  06/06/2024    Breast cancer screen  06/27/2025    DTaP/Tdap/Td vaccine (3 - Td or Tdap) 07/07/2033    DEXA (modify frequency per FRAX score)  Completed    Hepatitis C screen  Completed    Hepatitis A vaccine  Aged Out    Hib vaccine  Aged Out    Meningococcal (ACWY) vaccine  Aged Out    Pneumococcal 0-64 years Vaccine  Discontinued    Diabetes screen  Discontinued    HIV screen  Discontinued       Hemoglobin A1C (%)   Date Value   05/09/2023 8.1   01/23/2023 9.9   10/25/2022 10.8             ( goal A1C is < 7)   No components found for: \"LABMICR\"  LDL Cholesterol (mg/dL)   Date Value   01/23/2023 68   11/17/2021 62       (goal LDL is <100)   BUN (mg/dL)   Date Value   11/17/2021 14     BP Readings from Last 3 Encounters:   07/14/23 137/89   07/07/23 (!) 149/83   06/06/23 (!) 142/88          (goal 120/80)    All Future Testing planned in [facility]  Lab Frequency Next Occurrence   DEXA Bone Density Axial Skeleton Once 02/23/2023   BYRON Digital Screen Bilateral Once 01/23/2023   Cardiac event monitor Once 05/31/2023               Patient Active Problem List:     Dysuria     Urinary tract infection with

## 2023-11-08 ENCOUNTER — OFFICE VISIT (OUTPATIENT)
Dept: FAMILY MEDICINE CLINIC | Age: 68
End: 2023-11-08
Payer: MEDICARE

## 2023-11-08 VITALS
WEIGHT: 166 LBS | HEIGHT: 62 IN | BODY MASS INDEX: 30.55 KG/M2 | HEART RATE: 92 BPM | DIASTOLIC BLOOD PRESSURE: 89 MMHG | SYSTOLIC BLOOD PRESSURE: 133 MMHG

## 2023-11-08 DIAGNOSIS — Z23 NEED FOR IMMUNIZATION AGAINST INFLUENZA: ICD-10-CM

## 2023-11-08 DIAGNOSIS — I10 ESSENTIAL HYPERTENSION: ICD-10-CM

## 2023-11-08 DIAGNOSIS — M81.8 OTHER OSTEOPOROSIS WITHOUT CURRENT PATHOLOGICAL FRACTURE: ICD-10-CM

## 2023-11-08 DIAGNOSIS — R73.9 HYPERGLYCEMIA: ICD-10-CM

## 2023-11-08 DIAGNOSIS — E03.8 HYPOTHYROIDISM DUE TO HASHIMOTO'S THYROIDITIS: ICD-10-CM

## 2023-11-08 DIAGNOSIS — E06.3 HYPOTHYROIDISM DUE TO HASHIMOTO'S THYROIDITIS: ICD-10-CM

## 2023-11-08 DIAGNOSIS — E11.65 UNCONTROLLED TYPE 2 DIABETES MELLITUS WITH HYPERGLYCEMIA (HCC): ICD-10-CM

## 2023-11-08 DIAGNOSIS — E11.9 TYPE 2 DIABETES MELLITUS WITHOUT COMPLICATION, WITHOUT LONG-TERM CURRENT USE OF INSULIN (HCC): Primary | ICD-10-CM

## 2023-11-08 LAB — HBA1C MFR BLD: 9.9 %

## 2023-11-08 PROCEDURE — 1123F ACP DISCUSS/DSCN MKR DOCD: CPT | Performed by: FAMILY MEDICINE

## 2023-11-08 PROCEDURE — 3079F DIAST BP 80-89 MM HG: CPT | Performed by: FAMILY MEDICINE

## 2023-11-08 PROCEDURE — 3075F SYST BP GE 130 - 139MM HG: CPT | Performed by: FAMILY MEDICINE

## 2023-11-08 PROCEDURE — 3046F HEMOGLOBIN A1C LEVEL >9.0%: CPT | Performed by: FAMILY MEDICINE

## 2023-11-08 PROCEDURE — 90686 IIV4 VACC NO PRSV 0.5 ML IM: CPT | Performed by: FAMILY MEDICINE

## 2023-11-08 PROCEDURE — 83036 HEMOGLOBIN GLYCOSYLATED A1C: CPT | Performed by: FAMILY MEDICINE

## 2023-11-08 PROCEDURE — 99213 OFFICE O/P EST LOW 20 MIN: CPT | Performed by: FAMILY MEDICINE

## 2023-11-08 RX ORDER — ALENDRONATE SODIUM 70 MG/1
70 TABLET ORAL
Qty: 12 TABLET | Refills: 3 | Status: SHIPPED | OUTPATIENT
Start: 2023-11-08

## 2023-11-08 RX ORDER — LISINOPRIL 20 MG/1
20 TABLET ORAL DAILY
Qty: 90 TABLET | Refills: 3 | Status: SHIPPED | OUTPATIENT
Start: 2023-11-08

## 2023-11-08 RX ORDER — METFORMIN HYDROCHLORIDE 500 MG/1
1000 TABLET, EXTENDED RELEASE ORAL 2 TIMES DAILY
Qty: 120 TABLET | Refills: 5 | Status: SHIPPED | OUTPATIENT
Start: 2023-11-08

## 2023-11-08 RX ORDER — LEVOTHYROXINE SODIUM 0.1 MG/1
100 TABLET ORAL DAILY
Qty: 90 TABLET | Refills: 3 | Status: SHIPPED | OUTPATIENT
Start: 2023-11-08 | End: 2024-11-02

## 2023-11-08 RX ORDER — GLIPIZIDE 10 MG/1
10 TABLET ORAL 2 TIMES DAILY
Qty: 180 TABLET | Refills: 1 | Status: SHIPPED | OUTPATIENT
Start: 2023-11-08

## 2023-11-08 NOTE — PROGRESS NOTES
[address]25 A is well-known to me. She presents back for routine checkup. Her POCT A1c today is 9.9. She states she is trying to remain compliant with her medications. She has used both metformin and glipizide. She is open to other medication changes but is concerned about the cost to her out-of-pocket. She is also open to insulin as we discussed the possibility of Lantus. I also took time detailing the mechanism of pathophysiology involving insulin and and glucose regulation. We will get her back on a every 3 or 4-month follow-up. Negative for:     Worry / mood complaints  Headache  Dizziness  Visual Disturbance  Hearing Changes  Nasal / sinus Symptoms  Mouth / tooth symptom, pain  Throat pain  Difficulty swallowing  Neck pain  Chest discomfort  Cough  SOB  N/V/D/C  Pelvic area discomfort  Bladder / voiding discomfort  Bowel complaints  MSk complaints   Numbness/tingling/abnormal sensations   Edema / Leg swelling  Dizziness  Fatigue  Bleeding   Skin    Pertinent Pos: See HPI -asymptomatic    Vitals:    11/08/23 1514   BP: 133/89   Pulse: 92       Alert and oriented to PPT  NAD    HEENT - neg  Neck - no bruits, no lymphadenopathy  Chest  HRRR w/o murmer  LCTAB no wheezes / rhonchi  Abdomen - soft, non-tender, BS  Extremities -0+ PTE    Gait / Station - stable, no dysequilibrium, uniform pace, no assist device, cane. Diagnosis Orders   1. Type 2 diabetes mellitus without complication, without long-term current use of insulin ([facility])  POCT glycosylated hemoglobin (Hb A1C)    metFORMIN (GLUCOPHAGE-XR) 500 MG extended release tablet    empagliflozin (JARDIANCE) 10 MG tablet      2. Need for immunization against influenza  Influenza, FLUARIX, (age 10 mo+),  IM, Preservative Free, 0.5 mL      3. Hyperglycemia  metFORMIN (GLUCOPHAGE-XR) 500 MG extended release tablet      4. Other osteoporosis without current pathological fracture  alendronate (FOSAMAX) 70 MG tablet      5.  Essential hypertension  lisinopril

## 2023-11-08 NOTE — PROGRESS NOTES
Visit Information    Have you changed or started any medications since your last visit including any over-the-counter medicines, vitamins, or herbal medicines? no   Have you stopped taking any of your medications? Is so, why? -  no  Are you having any side effects from any of your medications? - no    Have you seen any other physician or provider since your last visit?  no   Have you had any other diagnostic tests since your last visit? yes - Labs 7/14/23   Have you been seen in the emergency room and/or had an admission in a hospital since we last saw you?  no   Have you had your routine dental cleaning in the past 6 months?  no     Do you have an active MyChart account? If no, what is the barrier?   Yes    Patient Care Team:  Harriet Norris DO as PCP - General (Family Medicine)  Harriet Norris DO as PCP - Empaneled Provider    Medical History Review  Past Medical, Family, and Social History reviewed and does contribute to the patient presenting condition    Health Maintenance   Topic Date Due    COVID-19 Vaccine (1) Never done    Diabetic retinal exam  Never done    Colorectal Cancer Screen  Never done    Shingles vaccine (1 of 2) Never done    Low dose CT lung screening &/or counseling  Never done    Hepatitis B vaccine (1 of 3 - Risk 3-dose series) Never done    Pneumococcal 65+ years Vaccine (2 - PCV) 10/02/2021    GFR test (Diabetes, CKD 3-4, OR last GFR 15-59)  11/17/2022    Flu vaccine (1) 08/01/2023    Annual Wellness Visit (AWV)  10/05/2023    Diabetic foot exam  10/25/2023    Diabetic Alb to Cr ratio (uACR) test  01/23/2024    Lipids  01/23/2024    A1C test (Diabetic or Prediabetic)  05/09/2024    Depression Monitoring  06/06/2024    Breast cancer screen  06/27/2025    DTaP/Tdap/Td vaccine (3 - Td or Tdap) 07/07/2033    DEXA (modify frequency per FRAX score)  Completed    Hepatitis C screen  Completed    Hepatitis A vaccine  Aged Out    Hib vaccine  Aged Out    Meningococcal (ACWY) vaccine

## 2023-11-28 DIAGNOSIS — E11.9 TYPE 2 DIABETES MELLITUS WITHOUT COMPLICATION, WITHOUT LONG-TERM CURRENT USE OF INSULIN (HCC): ICD-10-CM

## 2023-11-28 DIAGNOSIS — R73.9 HYPERGLYCEMIA: ICD-10-CM

## 2023-11-28 NOTE — TELEPHONE ENCOUNTER
Pharmacy requesting 90 day of supply for medication metformin to be sent to pharmacy. Please address the medication refill and close the encounter. If I can be of assistance, please route to the applicable pool. Thank you. Last visit: 11-8-23  Last Med refill: 11-8-23  Does patient have enough medication for 72 hours: No:     Next Visit Date:  No future appointments.     Health Maintenance   Topic Date Due    COVID-19 Vaccine (1) Never done    Diabetic retinal exam  Never done    Colorectal Cancer Screen  Never done    Shingles vaccine (1 of 2) Never done    Low dose CT lung screening &/or counseling  Never done    Hepatitis B vaccine (1 of 3 - Risk 3-dose series) Never done    Pneumococcal 65+ years Vaccine (2 - PCV) 10/02/2021    GFR test (Diabetes, CKD 3-4, OR last GFR 15-59)  11/17/2022    Annual Wellness Visit (AWV)  10/05/2023    Diabetic foot exam  10/25/2023    Diabetic Alb to Cr ratio (uACR) test  01/23/2024    Lipids  01/23/2024    A1C test (Diabetic or Prediabetic)  02/08/2024    Depression Monitoring  06/06/2024    Breast cancer screen  06/27/2025    DTaP/Tdap/Td vaccine (3 - Td or Tdap) 07/07/2033    DEXA (modify frequency per FRAX score)  Completed    Flu vaccine  Completed    Hepatitis C screen  Completed    Hepatitis A vaccine  Aged Out    Hib vaccine  Aged Out    Meningococcal (ACWY) vaccine  Aged Out    Pneumococcal 0-64 years Vaccine  Discontinued    Diabetes screen  Discontinued    HIV screen  Discontinued       Hemoglobin A1C (%)   Date Value   11/08/2023 9.9   05/09/2023 8.1   01/23/2023 9.9             ( goal A1C is < 7)   No components found for: \"LABMICR\"  LDL Cholesterol (mg/dL)   Date Value   01/23/2023 68   11/17/2021 62       (goal LDL is <100)   BUN (mg/dL)   Date Value   11/17/2021 14     BP Readings from Last 3 Encounters:   11/08/23 133/89   07/14/23 137/89   07/07/23 (!) 149/83          (goal 120/80)    All Future Testing planned in [facility]  Lab Frequency Next

## 2023-11-29 RX ORDER — METFORMIN HYDROCHLORIDE 500 MG/1
1000 TABLET, EXTENDED RELEASE ORAL 2 TIMES DAILY
Qty: 120 TABLET | Refills: 5 | Status: SHIPPED | OUTPATIENT
Start: 2023-11-29

## 2024-01-11 DIAGNOSIS — E08.00 DIABETES MELLITUS DUE TO UNDERLYING CONDITION WITH HYPEROSMOLARITY WITHOUT COMA, UNSPECIFIED WHETHER LONG TERM INSULIN USE (HCC): ICD-10-CM

## 2024-01-11 DIAGNOSIS — F32.A DEPRESSION, UNSPECIFIED DEPRESSION TYPE: ICD-10-CM

## 2024-01-11 DIAGNOSIS — I10 ESSENTIAL HYPERTENSION: ICD-10-CM

## 2024-01-11 RX ORDER — CITALOPRAM 40 MG/1
40 TABLET ORAL DAILY
Qty: 90 TABLET | Refills: 3 | Status: SHIPPED | OUTPATIENT
Start: 2024-01-11 | End: 2025-01-05

## 2024-01-11 RX ORDER — ASPIRIN 81 MG/1
81 TABLET ORAL DAILY
Qty: 90 TABLET | Refills: 3 | Status: SHIPPED | OUTPATIENT
Start: 2024-01-11 | End: 2024-03-12 | Stop reason: ALTCHOICE

## 2024-01-11 NOTE — TELEPHONE ENCOUNTER
Last visit: 11/8/23  Last Med refill: 1/23/23  Does patient have enough medication for 72 hours: no    Next Visit Date:  Future Appointments   Date Time Provider Department Center   3/12/2024 10:00 AM Katina Carcamo MD [email or web address]       Health Maintenance   Topic Date Due    COVID-19 Vaccine (1) Never done    Diabetic retinal exam  Never done    Colorectal Cancer Screen  Never done    Shingles vaccine (1 of 2) Never done    Low dose CT lung screening &/or counseling  Never done    Respiratory Syncytial Virus (RSV) Pregnant or age 60 yrs+ (1 - 1-dose 60+ series) Never done    Pneumococcal 65+ years Vaccine (2 - PCV) 10/02/2021    GFR test (Diabetes, CKD 3-4, OR last GFR 15-59)  11/17/2022    Diabetic foot exam  10/25/2023    Annual Wellness Visit (Medicare Advantage)  01/01/2024    Diabetic Alb to Cr ratio (uACR) test  01/23/2024    Lipids  01/23/2024    A1C test (Diabetic or Prediabetic)  02/08/2024    Depression Monitoring  06/06/2024    Breast cancer screen  06/27/2025    DTaP/Tdap/Td vaccine (3 - Td or Tdap) 07/07/2033    DEXA (modify frequency per FRAX score)  Completed    Flu vaccine  Completed    Hepatitis C screen  Completed    Hepatitis A vaccine  Aged Out    Hepatitis B vaccine  Aged Out    Hib vaccine  Aged Out    Polio vaccine  Aged Out    Meningococcal (ACWY) vaccine  Aged Out    Pneumococcal 0-64 years Vaccine  Discontinued    Diabetes screen  Discontinued    HIV screen  Discontinued       Hemoglobin A1C (%)   Date Value   11/08/2023 9.9   05/09/2023 8.1   01/23/2023 9.9             ( goal A1C is < 7)   No components found for: \"LABMICR\"  LDL Cholesterol (mg/dL)   Date Value   01/23/2023 68   11/17/2021 62       (goal LDL is <100)   BUN (mg/dL)   Date Value   11/17/2021 14     BP Readings from Last 3 Encounters:   11/08/23 133/89   07/14/23 137/89   07/07/23 (!) 149/83          (goal 120/80)    All Future Testing planned in [facility]  Lab Frequency Next Occurrence   DEXA Bone Density Axial Skeleton

## 2024-01-24 DIAGNOSIS — E78.2 MIXED HYPERLIPIDEMIA: ICD-10-CM

## 2024-01-24 RX ORDER — ATORVASTATIN CALCIUM 40 MG/1
40 TABLET, FILM COATED ORAL DAILY
Qty: 90 TABLET | Refills: 1 | Status: SHIPPED | OUTPATIENT
Start: 2024-01-24 | End: 2024-07-22

## 2024-01-24 NOTE — TELEPHONE ENCOUNTER
Last visit: 11/08/2023  Last Med refill: 06/06/2023  Does patient have enough medication for 72 hours: No:       Pharmacy is requesting a 90 day supply    Next Visit Date:  Future Appointments   Date Time Provider Department Center   3/12/2024 10:00 AM Katina Carcamo MD [id number]       Health Maintenance   Topic Date Due    COVID-19 Vaccine (1) Never done    Diabetic retinal exam  Never done    Colorectal Cancer Screen  Never done    Shingles vaccine (1 of 2) Never done    Low dose CT lung screening &/or counseling  Never done    Respiratory Syncytial Virus (RSV) Pregnant or age 60 yrs+ (1 - 1-dose 60+ series) Never done    Pneumococcal 65+ years Vaccine (2 - PCV) 10/02/2021    GFR test (Diabetes, CKD 3-4, OR last GFR 15-59)  11/17/2022    Diabetic foot exam  10/25/2023    Annual Wellness Visit (Medicare Advantage)  01/01/2024    Diabetic Alb to Cr ratio (uACR) test  01/23/2024    Lipids  01/23/2024    A1C test (Diabetic or Prediabetic)  02/08/2024    Depression Monitoring  06/06/2024    Breast cancer screen  06/27/2025    DTaP/Tdap/Td vaccine (3 - Td or Tdap) 07/07/2033    DEXA (modify frequency per FRAX score)  Completed    Flu vaccine  Completed    Hepatitis C screen  Completed    Hepatitis A vaccine  Aged Out    Hepatitis B vaccine  Aged Out    Hib vaccine  Aged Out    Polio vaccine  Aged Out    Meningococcal (ACWY) vaccine  Aged Out    Pneumococcal 0-64 years Vaccine  Discontinued    Diabetes screen  Discontinued    HIV screen  Discontinued       Hemoglobin A1C (%)   Date Value   11/08/2023 9.9   05/09/2023 8.1   01/23/2023 9.9             ( goal A1C is < 7)   No components found for: \"LABMICR\"  LDL Cholesterol (mg/dL)   Date Value   01/23/2023 68   11/17/2021 62       (goal LDL is <100)   BUN (mg/dL)   Date Value   11/17/2021 14     BP Readings from Last 3 Encounters:   11/08/23 133/89   07/14/23 137/89   07/07/23 (!) 149/83          (goal 120/80)    All Future Testing planned in [facility]  Lab Frequency

## 2024-02-02 ENCOUNTER — TELEPHONE (OUTPATIENT)
Dept: FAMILY MEDICINE CLINIC | Age: 69
End: 2024-02-02

## 2024-02-02 DIAGNOSIS — E11.9 TYPE 2 DIABETES MELLITUS WITHOUT COMPLICATION, WITHOUT LONG-TERM CURRENT USE OF INSULIN (HCC): Primary | ICD-10-CM

## 2024-02-02 NOTE — TELEPHONE ENCOUNTER
Pt due for Hemoglobin A1C check on 2/8/24, last reading was 9.9 on 11/8/23, but patient is not scheduled for return appt until 3/12/24. Lab order pended for external collection prior to appt so we have results to be addressed beforehand, please review and advise.

## 2024-03-12 ENCOUNTER — OFFICE VISIT (OUTPATIENT)
Dept: FAMILY MEDICINE CLINIC | Age: 69
End: 2024-03-12
Payer: MEDICARE

## 2024-03-12 ENCOUNTER — HOSPITAL ENCOUNTER (OUTPATIENT)
Age: 69
Setting detail: SPECIMEN
Discharge: HOME | End: 2024-03-12

## 2024-03-12 VITALS
WEIGHT: 165 LBS | SYSTOLIC BLOOD PRESSURE: 145 MMHG | DIASTOLIC BLOOD PRESSURE: 84 MMHG | BODY MASS INDEX: 30.18 KG/M2 | HEART RATE: 83 BPM

## 2024-03-12 DIAGNOSIS — I10 ESSENTIAL HYPERTENSION: ICD-10-CM

## 2024-03-12 DIAGNOSIS — Z23 NEED FOR PNEUMOCOCCAL 20-VALENT CONJUGATE VACCINATION: ICD-10-CM

## 2024-03-12 DIAGNOSIS — E11.65 UNCONTROLLED TYPE 2 DIABETES MELLITUS WITH HYPERGLYCEMIA (HCC): ICD-10-CM

## 2024-03-12 DIAGNOSIS — R73.9 HYPERGLYCEMIA: ICD-10-CM

## 2024-03-12 DIAGNOSIS — E78.2 MIXED HYPERLIPIDEMIA: ICD-10-CM

## 2024-03-12 DIAGNOSIS — R53.83 OTHER FATIGUE: ICD-10-CM

## 2024-03-12 DIAGNOSIS — E11.9 TYPE 2 DIABETES MELLITUS WITHOUT COMPLICATION, WITHOUT LONG-TERM CURRENT USE OF INSULIN (HCC): ICD-10-CM

## 2024-03-12 DIAGNOSIS — E11.9 TYPE 2 DIABETES MELLITUS WITHOUT COMPLICATION, WITHOUT LONG-TERM CURRENT USE OF INSULIN (HCC): Primary | ICD-10-CM

## 2024-03-12 DIAGNOSIS — E06.3 HYPOTHYROIDISM DUE TO HASHIMOTO'S THYROIDITIS: ICD-10-CM

## 2024-03-12 DIAGNOSIS — E03.8 HYPOTHYROIDISM DUE TO HASHIMOTO'S THYROIDITIS: ICD-10-CM

## 2024-03-12 DIAGNOSIS — Z87.891 PERSONAL HISTORY OF TOBACCO USE: ICD-10-CM

## 2024-03-12 DIAGNOSIS — F32.A DEPRESSION, UNSPECIFIED DEPRESSION TYPE: ICD-10-CM

## 2024-03-12 LAB
25(OH)D3 SERPL-MCNC: 14.1 NG/ML (ref 30–100)
ALBUMIN SERPL-MCNC: 4.2 G/DL (ref 3.5–5.2)
ALBUMIN/GLOB SERPL: 2 {RATIO} (ref 1–2.5)
ALP SERPL-CCNC: 85 U/L (ref 35–104)
ALT SERPL-CCNC: 11 U/L (ref 10–35)
ANION GAP SERPL CALCULATED.3IONS-SCNC: 14 MMOL/L (ref 9–16)
AST SERPL-CCNC: 15 U/L (ref 10–35)
BILIRUB SERPL-MCNC: 0.4 MG/DL (ref 0–1.2)
BUN SERPL-MCNC: 11 MG/DL (ref 8–23)
CALCIUM SERPL-MCNC: 9.2 MG/DL (ref 8.6–10.4)
CHLORIDE SERPL-SCNC: 104 MMOL/L (ref 98–107)
CHOLEST SERPL-MCNC: 146 MG/DL (ref 0–199)
CHOLESTEROL/HDL RATIO: 3
CO2 SERPL-SCNC: 21 MMOL/L (ref 20–31)
CREAT SERPL-MCNC: 0.5 MG/DL (ref 0.5–0.9)
CREAT UR-MCNC: 223 MG/DL (ref 28–217)
GFR SERPL CREATININE-BSD FRML MDRD: >60 ML/MIN/1.73M2
GLUCOSE SERPL-MCNC: 182 MG/DL (ref 74–99)
HBA1C MFR BLD: 10.8 %
HDLC SERPL-MCNC: 43 MG/DL
LDLC SERPL CALC-MCNC: 60 MG/DL (ref 0–100)
MICROALBUMIN UR-MCNC: 584 MG/L (ref 0–20)
MICROALBUMIN/CREAT UR-RTO: 262 MCG/MG CREAT (ref 0–25)
POTASSIUM SERPL-SCNC: 3.9 MMOL/L (ref 3.7–5.3)
PROT SERPL-MCNC: 6.8 G/DL (ref 6.6–8.7)
SODIUM SERPL-SCNC: 139 MMOL/L (ref 136–145)
TRIGL SERPL-MCNC: 218 MG/DL
TSH SERPL DL<=0.05 MIU/L-ACNC: 1.24 UIU/ML (ref 0.27–4.2)
VLDLC SERPL CALC-MCNC: 44 MG/DL

## 2024-03-12 PROCEDURE — 99213 OFFICE O/P EST LOW 20 MIN: CPT

## 2024-03-12 PROCEDURE — 1123F ACP DISCUSS/DSCN MKR DOCD: CPT

## 2024-03-12 PROCEDURE — 3079F DIAST BP 80-89 MM HG: CPT

## 2024-03-12 PROCEDURE — 3046F HEMOGLOBIN A1C LEVEL >9.0%: CPT

## 2024-03-12 PROCEDURE — 90677 PCV20 VACCINE IM: CPT

## 2024-03-12 PROCEDURE — 3077F SYST BP >= 140 MM HG: CPT

## 2024-03-12 PROCEDURE — G0296 VISIT TO DETERM LDCT ELIG: HCPCS

## 2024-03-12 PROCEDURE — 83036 HEMOGLOBIN GLYCOSYLATED A1C: CPT

## 2024-03-12 RX ORDER — AMLODIPINE BESYLATE 5 MG/1
5 TABLET ORAL DAILY
Qty: 90 TABLET | Refills: 0 | Status: SHIPPED | OUTPATIENT
Start: 2024-03-12 | End: 2024-06-10

## 2024-03-12 RX ORDER — LISINOPRIL 20 MG/1
20 TABLET ORAL DAILY
Qty: 90 TABLET | Refills: 3 | Status: SHIPPED | OUTPATIENT
Start: 2024-03-12

## 2024-03-12 RX ORDER — ATORVASTATIN CALCIUM 40 MG/1
40 TABLET, FILM COATED ORAL DAILY
Qty: 90 TABLET | Refills: 1 | Status: SHIPPED | OUTPATIENT
Start: 2024-03-12 | End: 2024-09-08

## 2024-03-12 RX ORDER — CITALOPRAM 40 MG/1
40 TABLET ORAL DAILY
Qty: 90 TABLET | Refills: 3 | Status: SHIPPED | OUTPATIENT
Start: 2024-03-12 | End: 2025-03-07

## 2024-03-12 RX ORDER — METFORMIN HYDROCHLORIDE 500 MG/1
1000 TABLET, EXTENDED RELEASE ORAL 2 TIMES DAILY
Qty: 120 TABLET | Refills: 5 | Status: SHIPPED | OUTPATIENT
Start: 2024-03-12

## 2024-03-12 RX ORDER — BLOOD SUGAR DIAGNOSTIC
1 STRIP MISCELLANEOUS DAILY
Qty: 100 EACH | Refills: 3 | Status: SHIPPED | OUTPATIENT
Start: 2024-03-12

## 2024-03-12 RX ORDER — LEVOTHYROXINE SODIUM 0.1 MG/1
100 TABLET ORAL DAILY
Qty: 90 TABLET | Refills: 3 | Status: SHIPPED | OUTPATIENT
Start: 2024-03-12 | End: 2025-03-07

## 2024-03-12 ASSESSMENT — PATIENT HEALTH QUESTIONNAIRE - PHQ9
4. FEELING TIRED OR HAVING LITTLE ENERGY: 0
2. FEELING DOWN, DEPRESSED OR HOPELESS: 0
8. MOVING OR SPEAKING SO SLOWLY THAT OTHER PEOPLE COULD HAVE NOTICED. OR THE OPPOSITE, BEING SO FIGETY OR RESTLESS THAT YOU HAVE BEEN MOVING AROUND A LOT MORE THAN USUAL: 0
10. IF YOU CHECKED OFF ANY PROBLEMS, HOW DIFFICULT HAVE THESE PROBLEMS MADE IT FOR YOU TO DO YOUR WORK, TAKE CARE OF THINGS AT HOME, OR GET ALONG WITH OTHER PEOPLE: 0
1. LITTLE INTEREST OR PLEASURE IN DOING THINGS: 0
7. TROUBLE CONCENTRATING ON THINGS, SUCH AS READING THE NEWSPAPER OR WATCHING TELEVISION: 0
9. THOUGHTS THAT YOU WOULD BE BETTER OFF DEAD, OR OF HURTING YOURSELF: 0
SUM OF ALL RESPONSES TO PHQ9 QUESTIONS 1 & 2: 0
SUM OF ALL RESPONSES TO PHQ QUESTIONS 1-9: 0
SUM OF ALL RESPONSES TO PHQ QUESTIONS 1-9: 0
5. POOR APPETITE OR OVEREATING: 0
SUM OF ALL RESPONSES TO PHQ QUESTIONS 1-9: 0
3. TROUBLE FALLING OR STAYING ASLEEP: 0
6. FEELING BAD ABOUT YOURSELF - OR THAT YOU ARE A FAILURE OR HAVE LET YOURSELF OR YOUR FAMILY DOWN: 0
SUM OF ALL RESPONSES TO PHQ QUESTIONS 1-9: 0

## 2024-03-12 ASSESSMENT — ENCOUNTER SYMPTOMS
SHORTNESS OF BREATH: 0
ABDOMINAL PAIN: 0
CONSTIPATION: 0
NAUSEA: 0
DIARRHEA: 0
VOMITING: 1

## 2024-03-12 NOTE — PROGRESS NOTES
Discussed with the patient the current USPSTF guidelines released March 9, 2021 for screening for lung cancer.    For adults aged 50 to 80 years who have a 20 pack-year smoking history and currently smoke or have quit within the past 15 years the grade B recommendation is to:  Screen for lung cancer with low-dose computed tomography (LDCT) every year.  Stop screening once a person has not smoked for 15 years or has a health problem that limits life expectancy or the ability to have lung surgery.    The patient  reports that she has been smoking cigarettes. She has a 52.0 pack-year smoking history. She has been exposed to tobacco smoke. She has never used smokeless tobacco.. Discussed with patient the risks and benefits of screening, including over-diagnosis, false positive rate, and total radiation exposure.  The patient currently exhibits no signs or symptoms suggestive of lung cancer.  Discussed with patient the importance of compliance with yearly annual lung cancer screenings and willingness to undergo diagnosis and treatment if screening scan is positive.  In addition, the patient was counseled regarding the importance of remaining smoke free and/or total smoking cessation.    Also reviewed the following if the patient has Medicare that as of February 10, 2022, Medicare only covers LDCT screening in patients aged 50-77 with at least a 20 pack-year smoking history who currently smoke or have quit in the last 15 years  
extended release tablet      5. Hypothyroidism due to Hashimoto's thyroiditis  levothyroxine (SYNTHROID) 100 MCG tablet      6. Uncontrolled type 2 diabetes mellitus with hyperglycemia (HCC)      10.8, will increase DIAS treatment, consider switch to Glyburide to favor shorter acting, case d/w pharm, not candid for GLP/CGM, fol/up Food diary       7. Depression, unspecified depression type  citalopram (CELEXA) 40 MG tablet      8. Mixed hyperlipidemia  atorvastatin (LIPITOR) 40 MG tablet      9. Need for pneumococcal 20-valent conjugate vaccination  Pneumococcal, PCV20, PREVNAR 20, (age 6w+), IM, PF      10. Personal history of tobacco use  KY VISIT TO DISCUSS LUNG CA SCREEN W LDCT    CT Lung Screen (Initial/Annual/Baseline)        I agree with  orders as documented by the resident.  Recommendations: Agree with resident assessment and plan.  Patient likely to benefit from further assistance with diabetic education, pharmacy team, etc.  Possible concern for patient not being able to get Jardiance from pharmacy due to insurance issues.  We will also work on patient metabolic risk reduction with smoking cessation, etc. in the future however patient to get low-dose CT scan at this time.  Other concerns include new development of acid reflux-like symptoms with uncontrolled diabetes including risk of gastroparesis and other things in differential.  Will consider possible further workup of this pending patient clinical scenario.  Return in about 4 weeks (around 4/9/2024) for dm follow up.   (GE Modifier ) Dr. LADY MCCOY MD  
Sig: Take 1 tablet by mouth daily       Medications Discontinued During This Encounter   Medication Reason    aspirin 81 MG EC tablet Therapy completed    blood glucose test strips (EXACTECH TEST) strip REORDER    amLODIPine (NORVASC) 5 MG tablet REORDER    lisinopril (PRINIVIL;ZESTRIL) 20 MG tablet REORDER    levothyroxine (SYNTHROID) 100 MCG tablet REORDER    empagliflozin (JARDIANCE) 10 MG tablet REORDER    metFORMIN (GLUCOPHAGE-XR) 500 MG extended release tablet REORDER    citalopram (CELEXA) 40 MG tablet REORDER    atorvastatin (LIPITOR) 40 MG tablet REORDER       Juany received counseling on the following healthy behaviors: nutrition, exercise and medication adherence    Discussed use,benefit, and side effects of prescribed medications.  Barriers to medication compliance addressed.      All patient questions answered.  Pt voiced understanding.     Return in about 4 weeks (around 4/9/2024) for dm follow up.        Disclaimer: Some orall of this note was transcribed using voice-recognition software.This may cause typographical errors occasionally. Although all effort is made to fix these errors, please do not hesitate to contact our office if there isany concern with the understanding of this note.

## 2024-04-09 ENCOUNTER — OFFICE VISIT (OUTPATIENT)
Dept: FAMILY MEDICINE CLINIC | Age: 69
End: 2024-04-09

## 2024-04-09 VITALS
BODY MASS INDEX: 29.23 KG/M2 | HEIGHT: 63 IN | DIASTOLIC BLOOD PRESSURE: 82 MMHG | SYSTOLIC BLOOD PRESSURE: 138 MMHG | WEIGHT: 165 LBS | HEART RATE: 80 BPM

## 2024-04-09 DIAGNOSIS — E78.2 MIXED HYPERLIPIDEMIA: ICD-10-CM

## 2024-04-09 DIAGNOSIS — F32.A DEPRESSION, UNSPECIFIED DEPRESSION TYPE: ICD-10-CM

## 2024-04-09 DIAGNOSIS — R73.9 HYPERGLYCEMIA: ICD-10-CM

## 2024-04-09 DIAGNOSIS — E11.9 TYPE 2 DIABETES MELLITUS WITHOUT COMPLICATION, WITHOUT LONG-TERM CURRENT USE OF INSULIN (HCC): Primary | ICD-10-CM

## 2024-04-09 DIAGNOSIS — E06.3 HYPOTHYROIDISM DUE TO HASHIMOTO'S THYROIDITIS: ICD-10-CM

## 2024-04-09 DIAGNOSIS — E03.8 HYPOTHYROIDISM DUE TO HASHIMOTO'S THYROIDITIS: ICD-10-CM

## 2024-04-09 DIAGNOSIS — E11.65 UNCONTROLLED TYPE 2 DIABETES MELLITUS WITH HYPERGLYCEMIA (HCC): ICD-10-CM

## 2024-04-09 DIAGNOSIS — I10 ESSENTIAL HYPERTENSION: ICD-10-CM

## 2024-04-09 RX ORDER — LEVOTHYROXINE SODIUM 0.1 MG/1
100 TABLET ORAL DAILY
Qty: 90 TABLET | Refills: 3 | Status: SHIPPED | OUTPATIENT
Start: 2024-04-09 | End: 2025-04-04

## 2024-04-09 RX ORDER — AMLODIPINE BESYLATE 5 MG/1
5 TABLET ORAL DAILY
Qty: 90 TABLET | Refills: 0 | Status: SHIPPED | OUTPATIENT
Start: 2024-04-09 | End: 2024-07-08

## 2024-04-09 RX ORDER — LISINOPRIL 20 MG/1
20 TABLET ORAL DAILY
Qty: 90 TABLET | Refills: 3 | Status: SHIPPED | OUTPATIENT
Start: 2024-04-09

## 2024-04-09 RX ORDER — GLIPIZIDE 10 MG/1
10 TABLET ORAL 2 TIMES DAILY
Qty: 180 TABLET | Refills: 1 | Status: SHIPPED | OUTPATIENT
Start: 2024-04-09

## 2024-04-09 RX ORDER — CITALOPRAM 40 MG/1
40 TABLET ORAL DAILY
Qty: 90 TABLET | Refills: 3 | Status: SHIPPED | OUTPATIENT
Start: 2024-04-09 | End: 2025-04-04

## 2024-04-09 RX ORDER — ATORVASTATIN CALCIUM 40 MG/1
40 TABLET, FILM COATED ORAL DAILY
Qty: 90 TABLET | Refills: 1 | Status: SHIPPED | OUTPATIENT
Start: 2024-04-09 | End: 2024-10-06

## 2024-04-09 ASSESSMENT — ENCOUNTER SYMPTOMS
ABDOMINAL PAIN: 0
NAUSEA: 0
VOMITING: 0
DIARRHEA: 0
SHORTNESS OF BREATH: 0
CONSTIPATION: 0

## 2024-04-09 NOTE — PROGRESS NOTES
Subjective:    Juany Vazquez is a 68 y.o. female with  has no past medical history on file.    Presented to the office today for:  Chief Complaint   Patient presents with    Diabetes     Follow up        Diabetes  Pertinent negatives for hypoglycemia include no dizziness or headaches. Pertinent negatives for diabetes include no chest pain.       Patient is a 68-year-old female with history of diabetes, hypertension, she is presenting to the clinic today for routine follow-up.  Last time patient was seen, her HbA1c was 10.8, she was offered to start insulin at time, however she refused.  She was also prescribed Jardiance at the time, but she states she cannot afford the medication as her co-pay is around $300.  Patient states her anxiety is not improving, she is currently on Celexa 40 mg, she states she has tried psychologist and seeing psychiatrist in the past with minimal relief.  Patient's next concern is she has a lump in her back, she was referred to [place] [facility] about 1 year ago, and was diagnosed with a sebaceous cyst, they are working to schedule removal in the OR, but it turned out that the providers were not covered under the patient's insurance.  Patient states the cyst has been growing in size and is causing discomfort for her.  She denies any fever, chills.      Review of Systems   Constitutional:  Negative for chills and fever.   Respiratory:  Negative for shortness of breath.    Cardiovascular:  Negative for chest pain.   Gastrointestinal:  Negative for abdominal pain, constipation, diarrhea, nausea and vomiting.   Neurological:  Negative for dizziness, light-headedness and headaches.                 The patient has a No family history on file.    Objective:    /82   Pulse 80   Ht 1.6 m (5' 3\")   Wt 74.8 kg (165 lb)   BMI 29.23 kg/m²    BP Readings from Last 3 Encounters:   04/09/24 138/82   03/12/24 (!) 145/84   11/08/23 133/89       Physical Exam  Constitutional:       Appearance:

## 2024-04-09 NOTE — PROGRESS NOTES
Attending Physician Statement  I  have discussed the care of Juany Vazquez including pertinent history and exam findings with the resident. I agree with the assessment, plan and orders as documented by the resident.      /82   Pulse 80   Ht 1.6 m (5' 3\")   Wt 74.8 kg (165 lb)   BMI 29.23 kg/m²    BP Readings from Last 3 Encounters:   04/09/24 138/82   03/12/24 (!) 145/84   11/08/23 133/89     Wt Readings from Last 3 Encounters:   04/09/24 74.8 kg (165 lb)   03/12/24 74.8 kg (165 lb)   11/08/23 75.3 kg (166 lb)          Diagnosis Orders   1. Type 2 diabetes mellitus without complication, without long-term current use of insulin (HCC)  [facility] Primary [facility] Pharmacist    SITagliptin (JANUVIA) 50 MG tablet      2. Essential hypertension  amLODIPine (NORVASC) 5 MG tablet    lisinopril (PRINIVIL;ZESTRIL) 20 MG tablet      3. Hyperglycemia        4. Mixed hyperlipidemia  atorvastatin (LIPITOR) 40 MG tablet      5. Depression, unspecified depression type  citalopram (CELEXA) 40 MG tablet      6. Uncontrolled type 2 diabetes mellitus with hyperglycemia (HCC)  glipiZIDE (GLUCOTROL) 10 MG tablet    10.8, will increase DIAS treatment, consider switch to Glyburide to favor shorter acting, case d/w pharm, not candid for GLP/CGM, fol/up Food diary       7. Hypothyroidism due to Hashimoto's thyroiditis  levothyroxine (SYNTHROID) 100 MCG tablet        Sebaceous cyst- requires OR removal d/t size and risk factors- insurance issues- instructed to reach out to insurance for in network provider.         Dannielle Marks DO 4/9/2024 10:58 AM

## 2024-04-30 ENCOUNTER — PHARMACY VISIT (OUTPATIENT)
Dept: FAMILY MEDICINE CLINIC | Age: 69
End: 2024-04-30

## 2024-04-30 VITALS — BODY MASS INDEX: 28.41 KG/M2 | WEIGHT: 160.4 LBS

## 2024-04-30 DIAGNOSIS — Z79.899 MEDICATION MANAGEMENT: Primary | ICD-10-CM

## 2024-04-30 PROCEDURE — 99999 PR OFFICE/OUTPT VISIT,PROCEDURE ONLY: CPT | Performed by: PHARMACIST

## 2024-04-30 NOTE — PROGRESS NOTES
Hypoglycemia symptoms and management   [] Blood glucose goals    [] Introduction to FreeStyle Gabe 2 and continuing glucose monitoring   [] Interpreting Ambulatory Glucose Profile (AGP) Report with focus on page 1, average number of scans and glucose levels per day, and snapshot     The following brochure(s)/handout(s) discussed with patient during visit:       [] What is diabetes   [] Checking your blood sugar   [] Know your numbers   [] Hypoglycemia symptoms and management/Hyperglycemia symptoms   [] Blood glucose log sheet(s)   [] Planning Healthy Meals   [] My Plate   [] DM snacks   [] One Week-DM meal plan   [] Building a balanced meal   [] FreeStyle Gabe 2 - Get Started    [] Today's Ambulatory Glucose Profile (AGP) Report    Follow-Up: Follow up with PharmD on 05/14/2024 for Gabe 3 sensor placement, PAP for Ozempic (pt to bring in tax documents, and consider education around dietary choices. Follow up with PCP on 05/07/2024    Future Considerations:   GLP1-will need to apply for PAP through [employer]-May take 4-6 weeks to receive medication once application has faxed.    WMRG1x-Ytxe also need PAP.  Will need to verify household income.  Consider hx of UTI and starting SGLT1i    Patient verbalized understanding of care plan. Patient advised to call Medication Management with any questions, concerns, or changes prior to next appointment.    Spoke with referring provider regarding visit. (Katina Lao MD)   Patient acknowledges working in a consult agreement with the pharmacist as referred by his/her physician.     Imani Anderson, Pharm.D., BCACP  Clinical Pharmacist  [facility] Medication Management Service  [phone]  4/30/2024  9:40 AM      ==================================================================    For Pharmacy Admin Tracking Only    Program: Medical Group  CPA in place:  Yes  Time Spent (min): 45

## 2024-05-07 ENCOUNTER — OFFICE VISIT (OUTPATIENT)
Dept: FAMILY MEDICINE CLINIC | Age: 69
End: 2024-05-07
Payer: MEDICARE

## 2024-05-07 VITALS
HEIGHT: 63 IN | HEART RATE: 81 BPM | SYSTOLIC BLOOD PRESSURE: 139 MMHG | BODY MASS INDEX: 28.21 KG/M2 | DIASTOLIC BLOOD PRESSURE: 89 MMHG | WEIGHT: 159.2 LBS

## 2024-05-07 DIAGNOSIS — E11.9 TYPE 2 DIABETES MELLITUS WITHOUT COMPLICATION, WITHOUT LONG-TERM CURRENT USE OF INSULIN (HCC): Primary | ICD-10-CM

## 2024-05-07 PROCEDURE — 99213 OFFICE O/P EST LOW 20 MIN: CPT

## 2024-05-07 PROCEDURE — 3075F SYST BP GE 130 - 139MM HG: CPT

## 2024-05-07 PROCEDURE — 3046F HEMOGLOBIN A1C LEVEL >9.0%: CPT

## 2024-05-07 PROCEDURE — 1123F ACP DISCUSS/DSCN MKR DOCD: CPT

## 2024-05-07 PROCEDURE — 3079F DIAST BP 80-89 MM HG: CPT

## 2024-05-07 NOTE — PATIENT INSTRUCTIONS
Thank you for letting us take care of you today. We hope all your questions were addressed. If a question was overlooked or something else comes to mind after you return home, please contact a member of your Care Team listed below.      Your Care Team at [facility] is Team #  Carl Andrade, (Faculty)  Jeannette Jiménez (Resident)  Jeannette Peterson (Resident)   Analy Carcamo (Resident)  James Lang (Resident)  Kimberlyn Bourgeois., [facility]  Paige Francisco., [facility]  Danay Harper, [facility]  Fatou Tompkins, [facility]  Daryl Chi, [facility]  Elizabeth Khalil, [facility]  Cinthia Sethi, [facility]  Atiya Partida, DIANDRA Mendes (LJ) ANNE-MARIE Farmer (Clinical Practice Manager)  Imani Anderson [facility] (Clinical Pharmacist)     Office phone number: [phone]    If you need to get in right away due to illness, please be advised we have \"Same Day\" appointments available Monday-Friday. Please call us at [phone] option #3 to schedule your \"Same Day\" appointment.

## 2024-05-07 NOTE — PROGRESS NOTES
Attending Physician Statement  I  have discussed the care of Juany Vazquez including pertinent history and exam findings with the resident. I agree with the assessment, plan and orders as documented by the resident.      /89 (Site: Left Upper Arm, Position: Sitting, Cuff Size: Medium Adult)   Pulse 81   Ht 1.6 m (5' 2.99\")   Wt 72.2 kg (159 lb 3.2 oz)   BMI 28.21 kg/m²    BP Readings from Last 3 Encounters:   05/07/24 139/89   04/09/24 138/82   03/12/24 (!) 145/84     Wt Readings from Last 3 Encounters:   05/07/24 72.2 kg (159 lb 3.2 oz)   04/30/24 72.8 kg (160 lb 6.4 oz)   04/09/24 74.8 kg (165 lb)          Diagnosis Orders   1. Type 2 diabetes mellitus without complication, without long-term current use of insulin (HCC)          DM follow up - A1c> 10. Refusing insulin at this time. Issue with insurance for PO meds. GLP needs prior auth- in house pharmacy recs for insulin. Patient understands risks- will attempt dietary changes and follow up at next visit. Foot exam at next visit.       Dannielle Marks DO 5/7/2024 11:34 AM      
Diabetic visit information    BP Readings from Last 3 Encounters:   04/09/24 138/82   03/12/24 (!) 145/84   11/08/23 133/89       Hemoglobin A1C (%)   Date Value   03/12/2024 10.8   11/08/2023 9.9   05/09/2023 8.1               Have you changed or started any medications since your last visit including any over-the-counter medicines, vitamins, or herbal medicines? no   Have you stopped taking any of your medications? Is so, why? -  no  Are you having any side effects from any of your medications? - no    Have you seen any other physician or provider since your last visit?  no   Have you had any other diagnostic tests since your last visit?  no   Have you been seen in the emergency room and/or had an admission in a hospital since we last saw you?  no     Have you had your annual diabetic retinal (eye) exam? No   (ensure copy of exam is in the chart)    Have you had your routine dental cleaning in the past 6 months? no    Do you have an active [employer] account?  If not, what are your barriers?  Yes    Patient Care Team:  Katina Carcamo MD as PCP - General (Family Medicine)  Imani Anderson [facility] as Pharmacist (Pharmacist)    Medical history Review  Past Medical, Family, and Social History reviewed and does contribute to the patient presenting condition.    Health Maintenance   Topic Date Due    COVID-19 Vaccine (1) Never done    Diabetic retinal exam  Never done    Colorectal Cancer Screen  Never done    Shingles vaccine (1 of 2) Never done    Low dose CT lung screening &/or counseling  Never done    Respiratory Syncytial Virus (RSV) Pregnant or age 60 yrs+ (1 - 1-dose 60+ series) Never done    Diabetic foot exam  10/25/2023    Annual Wellness Visit (Medicare Advantage)  01/01/2024    A1C test (Diabetic or Prediabetic)  06/12/2024    Diabetic Alb to Cr ratio (uACR) test  03/12/2025    Lipids  03/12/2025    Depression Monitoring  03/12/2025    GFR test (Diabetes, CKD 3-4, OR last GFR 15-59)  03/12/2025    Breast cancer 
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  Barriers to medication compliance addressed.      All patient questions answered.  Pt voiced understanding.     Return in about 6 weeks (around 6/18/2024) for follow up on diabetes and hba1c.        Disclaimer: Some orall of this note was transcribed using voice-recognition software.This may cause typographical errors occasionally. Although all effort is made to fix these errors, please do not hesitate to contact our office if there isany concern with the understanding of this note.

## 2024-07-23 ENCOUNTER — TELEPHONE (OUTPATIENT)
Dept: FAMILY MEDICINE CLINIC | Age: 69
End: 2024-07-23

## 2024-07-23 DIAGNOSIS — E11.9 TYPE 2 DIABETES MELLITUS WITHOUT COMPLICATION, WITHOUT LONG-TERM CURRENT USE OF INSULIN (HCC): Primary | ICD-10-CM

## 2024-07-23 NOTE — TELEPHONE ENCOUNTER
Medication Management Service ([facility])  [facility]  [phone]      PharmD Consult - New Consult    07/23/24    Referring Provider: Dr. Gotti  Clinic: [facility]    Juany Vazquez is referred to clinic pharmacists for diabetes management under CPA. Electronic order received from patient's PCP. Will call patient to schedule.    Ilir Agustin PharmD (Robbie)  [facility] Medication Management Service  [address] [place] [address]  Pharmacist Phone: [phone]  Clinic Phone: [phone]  ==================================================  For Pharmacy Admin Tracking Only     Program: Medical Group  CPA in place:  Yes  Time Spent (min): 10

## 2024-07-24 NOTE — TELEPHONE ENCOUNTER
Medication Management Service ([facility])  [facility]  [phone]      Attempted to call patient to schedule initial diabetes visit, but she did not answer the phone for today. M for her to call us back when she can.    Ilir Agustin (Robbie), PharmD  PGY2 Ambulatory Care Pharmacy Resident  [facility] Medication Management Service  [phone]  07/24/24 1:29 PM    ==================================================  For Pharmacy Admin Tracking Only     Program: Medical Group  CPA in place:  Yes  Time Spent (min): 10

## 2024-09-05 DIAGNOSIS — I10 ESSENTIAL HYPERTENSION: ICD-10-CM

## 2024-09-05 RX ORDER — AMLODIPINE BESYLATE 5 MG/1
5 TABLET ORAL DAILY
Qty: 90 TABLET | Refills: 0 | Status: SHIPPED | OUTPATIENT
Start: 2024-09-05 | End: 2024-12-04

## 2024-09-05 NOTE — TELEPHONE ENCOUNTER
Patient last seen Dr. Carcamo.       Please address the medication refill and close the encounter.  If I can be of assistance, please route to the applicable pool.      Thank you.      Last visit: 5-7-24  Last Med refill: 4-9-24  Does patient have enough medication for 72 hours: No:     Next Visit Date:  Future Appointments   Date Time Provider Department Center   9/24/2024  8:40 AM Yuly Nobles MD [facility]       Health Maintenance   Topic Date Due    Diabetic retinal exam  Never done    Colorectal Cancer Screen  Never done    Shingles vaccine (1 of 2) Never done    Respiratory Syncytial Virus (RSV) Pregnant or age 60 yrs+ (1 - 1-dose 60+ series) Never done    Lung Cancer Screening &/or Counseling  08/18/2022    Diabetic foot exam  10/25/2023    Annual Wellness Visit (Medicare Advantage)  01/01/2024    A1C test (Diabetic or Prediabetic)  06/12/2024    Flu vaccine (1) 08/01/2024    COVID-19 Vaccine (1 - 2023-24 season) Never done    Diabetic Alb to Cr ratio (uACR) test  03/12/2025    Lipids  03/12/2025    Depression Monitoring  03/12/2025    GFR test (Diabetes, CKD 3-4, OR last GFR 15-59)  03/12/2025    Breast cancer screen  06/27/2025    DTaP/Tdap/Td vaccine (3 - Td or Tdap) 07/07/2033    DEXA (modify frequency per FRAX score)  Completed    Pneumococcal 65+ years Vaccine  Completed    Hepatitis C screen  Completed    Hepatitis A vaccine  Aged Out    Hepatitis B vaccine  Aged Out    Hib vaccine  Aged Out    Polio vaccine  Aged Out    Meningococcal (ACWY) vaccine  Aged Out    Pneumococcal 0-64 years Vaccine  Discontinued    Diabetes screen  Discontinued    HIV screen  Discontinued       Hemoglobin A1C (%)   Date Value   03/12/2024 10.8   11/08/2023 9.9   05/09/2023 8.1             ( goal A1C is < 7)   No components found for: \"LABMICR\"  No components found for: \"LDLCHOLESTEROL\", \"LDLCALC\"    (goal LDL is <100)   AST (U/L)   Date Value   03/12/2024 15     ALT (U/L)   Date Value   03/12/2024 11     BUN

## 2024-09-24 ENCOUNTER — OFFICE VISIT (OUTPATIENT)
Dept: FAMILY MEDICINE CLINIC | Age: 69
End: 2024-09-24
Payer: MEDICARE

## 2024-09-24 VITALS
DIASTOLIC BLOOD PRESSURE: 91 MMHG | BODY MASS INDEX: 27.82 KG/M2 | HEART RATE: 82 BPM | SYSTOLIC BLOOD PRESSURE: 137 MMHG | WEIGHT: 157 LBS | TEMPERATURE: 98.3 F

## 2024-09-24 DIAGNOSIS — E78.2 MIXED HYPERLIPIDEMIA: ICD-10-CM

## 2024-09-24 DIAGNOSIS — R06.2 WHEEZING: ICD-10-CM

## 2024-09-24 DIAGNOSIS — I10 ESSENTIAL HYPERTENSION: ICD-10-CM

## 2024-09-24 DIAGNOSIS — E11.65 UNCONTROLLED TYPE 2 DIABETES MELLITUS WITH HYPERGLYCEMIA (HCC): ICD-10-CM

## 2024-09-24 LAB — HBA1C MFR BLD: 10.1 %

## 2024-09-24 PROCEDURE — 90656 IIV3 VACC NO PRSV 0.5 ML IM: CPT

## 2024-09-24 PROCEDURE — 3046F HEMOGLOBIN A1C LEVEL >9.0%: CPT

## 2024-09-24 PROCEDURE — 99213 OFFICE O/P EST LOW 20 MIN: CPT

## 2024-09-24 PROCEDURE — 99211 OFF/OP EST MAY X REQ PHY/QHP: CPT

## 2024-09-24 PROCEDURE — 83036 HEMOGLOBIN GLYCOSYLATED A1C: CPT

## 2024-09-24 PROCEDURE — 3075F SYST BP GE 130 - 139MM HG: CPT

## 2024-09-24 PROCEDURE — 3080F DIAST BP >= 90 MM HG: CPT

## 2024-09-24 PROCEDURE — 1123F ACP DISCUSS/DSCN MKR DOCD: CPT

## 2024-09-24 RX ORDER — ALBUTEROL SULFATE 90 UG/1
2 INHALANT RESPIRATORY (INHALATION) 4 TIMES DAILY PRN
Qty: 18 G | Refills: 0 | Status: SHIPPED | OUTPATIENT
Start: 2024-09-24

## 2024-09-24 RX ORDER — ATORVASTATIN CALCIUM 40 MG/1
40 TABLET, FILM COATED ORAL DAILY
Qty: 90 TABLET | Refills: 1 | Status: SHIPPED | OUTPATIENT
Start: 2024-09-24 | End: 2025-03-23

## 2024-09-24 RX ORDER — AMLODIPINE BESYLATE 5 MG/1
5 TABLET ORAL DAILY
Qty: 90 TABLET | Refills: 0 | Status: SHIPPED | OUTPATIENT
Start: 2024-09-24 | End: 2024-12-23

## 2024-09-24 RX ORDER — DAPAGLIFLOZIN 5 MG/1
5 TABLET, FILM COATED ORAL EVERY MORNING
Qty: 90 TABLET | Refills: 1 | Status: SHIPPED | OUTPATIENT
Start: 2024-09-24

## 2024-09-24 RX ORDER — GLIPIZIDE 10 MG/1
10 TABLET ORAL 2 TIMES DAILY
Qty: 180 TABLET | Refills: 1 | Status: SHIPPED | OUTPATIENT
Start: 2024-09-24

## 2024-09-24 ASSESSMENT — ENCOUNTER SYMPTOMS
VOMITING: 0
NAUSEA: 0
COUGH: 0
SHORTNESS OF BREATH: 0
CONSTIPATION: 0
ABDOMINAL DISTENTION: 0
STRIDOR: 0

## 2024-10-15 ENCOUNTER — OFFICE VISIT (OUTPATIENT)
Dept: FAMILY MEDICINE CLINIC | Age: 69
End: 2024-10-15
Payer: MEDICARE

## 2024-10-15 VITALS
BODY MASS INDEX: 28.84 KG/M2 | WEIGHT: 162.8 LBS | HEART RATE: 82 BPM | TEMPERATURE: 99.3 F | DIASTOLIC BLOOD PRESSURE: 69 MMHG | OXYGEN SATURATION: 97 % | HEIGHT: 63 IN | SYSTOLIC BLOOD PRESSURE: 125 MMHG

## 2024-10-15 DIAGNOSIS — E55.9 VITAMIN D DEFICIENCY: ICD-10-CM

## 2024-10-15 DIAGNOSIS — R42 VERTIGO: ICD-10-CM

## 2024-10-15 DIAGNOSIS — E78.2 MIXED HYPERLIPIDEMIA: ICD-10-CM

## 2024-10-15 DIAGNOSIS — E08.00 DIABETES MELLITUS DUE TO UNDERLYING CONDITION WITH HYPEROSMOLARITY WITHOUT COMA, UNSPECIFIED WHETHER LONG TERM INSULIN USE (HCC): ICD-10-CM

## 2024-10-15 DIAGNOSIS — M81.8 OTHER OSTEOPOROSIS WITHOUT CURRENT PATHOLOGICAL FRACTURE: ICD-10-CM

## 2024-10-15 DIAGNOSIS — R73.9 HYPERGLYCEMIA: ICD-10-CM

## 2024-10-15 DIAGNOSIS — E06.3 HYPOTHYROIDISM DUE TO HASHIMOTO'S THYROIDITIS: ICD-10-CM

## 2024-10-15 DIAGNOSIS — E11.9 TYPE 2 DIABETES MELLITUS WITHOUT COMPLICATION, WITHOUT LONG-TERM CURRENT USE OF INSULIN (HCC): ICD-10-CM

## 2024-10-15 DIAGNOSIS — Z87.891 PERSONAL HISTORY OF TOBACCO USE: Primary | ICD-10-CM
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
## 2024-10-15 DIAGNOSIS — E11.65 UNCONTROLLED TYPE 2 DIABETES MELLITUS WITH HYPERGLYCEMIA (HCC): ICD-10-CM

## 2024-10-15 DIAGNOSIS — F32.A DEPRESSION, UNSPECIFIED DEPRESSION TYPE: ICD-10-CM

## 2024-10-15 DIAGNOSIS — I10 ESSENTIAL HYPERTENSION: ICD-10-CM

## 2024-10-15 PROCEDURE — G0296 VISIT TO DETERM LDCT ELIG: HCPCS

## 2024-10-15 PROCEDURE — 99211 OFF/OP EST MAY X REQ PHY/QHP: CPT | Performed by: STUDENT IN AN ORGANIZED HEALTH CARE EDUCATION/TRAINING PROGRAM

## 2024-10-15 RX ORDER — BLOOD SUGAR DIAGNOSTIC
1 STRIP MISCELLANEOUS DAILY
Qty: 100 EACH | Refills: 3 | Status: SHIPPED | OUTPATIENT
Start: 2024-10-15

## 2024-10-15 RX ORDER — IBUPROFEN 600 MG/1
600 TABLET, FILM COATED ORAL EVERY 6 HOURS PRN
Qty: 120 TABLET | Refills: 0 | Status: SHIPPED | OUTPATIENT
Start: 2024-10-15 | End: 2024-11-14

## 2024-10-15 RX ORDER — LISINOPRIL 20 MG/1
20 TABLET ORAL DAILY
Qty: 90 TABLET | Refills: 3 | Status: SHIPPED | OUTPATIENT
Start: 2024-10-15

## 2024-10-15 RX ORDER — ERGOCALCIFEROL 1.25 MG/1
50000 CAPSULE, LIQUID FILLED ORAL WEEKLY
Qty: 4 CAPSULE | Refills: 5 | Status: SHIPPED | OUTPATIENT
Start: 2024-10-15

## 2024-10-15 RX ORDER — GLIPIZIDE 5 MG/1
5 TABLET, FILM COATED, EXTENDED RELEASE ORAL DAILY
Qty: 30 TABLET | Refills: 3 | Status: SHIPPED | OUTPATIENT
Start: 2024-10-15

## 2024-10-15 RX ORDER — LANCETS 33 GAUGE
EACH MISCELLANEOUS
Qty: 100 EACH | Refills: 0 | Status: CANCELLED | OUTPATIENT
Start: 2024-10-15

## 2024-10-15 RX ORDER — METFORMIN HCL 500 MG
1000 TABLET, EXTENDED RELEASE 24 HR ORAL 2 TIMES DAILY
Qty: 120 TABLET | Refills: 5 | Status: SHIPPED | OUTPATIENT
Start: 2024-10-15

## 2024-10-15 RX ORDER — GLIPIZIDE 10 MG/1
10 TABLET ORAL 2 TIMES DAILY
Qty: 180 TABLET | Refills: 1 | Status: CANCELLED | OUTPATIENT
Start: 2024-10-15

## 2024-10-15 RX ORDER — MECLIZINE HYDROCHLORIDE 25 MG/1
25 TABLET ORAL 3 TIMES DAILY PRN
Qty: 30 TABLET | Refills: 2 | Status: CANCELLED | OUTPATIENT
Start: 2024-10-15

## 2024-10-15 RX ORDER — IBUPROFEN 600 MG/1
600 TABLET, FILM COATED ORAL EVERY 6 HOURS PRN
COMMUNITY
Start: 2024-09-26 | End: 2024-10-15 | Stop reason: SDUPTHER

## 2024-10-15 RX ORDER — ALENDRONATE SODIUM 70 MG/1
70 TABLET ORAL
Qty: 12 TABLET | Refills: 3 | Status: SHIPPED | OUTPATIENT
Start: 2024-10-15

## 2024-10-15 RX ORDER — CYCLOBENZAPRINE HCL 5 MG
5 TABLET ORAL EVERY 8 HOURS PRN
COMMUNITY
Start: 2024-09-26

## 2024-10-15 RX ORDER — AMLODIPINE BESYLATE 5 MG/1
5 TABLET ORAL DAILY
Qty: 90 TABLET | Refills: 0 | Status: SHIPPED | OUTPATIENT
Start: 2024-10-15 | End: 2025-01-13

## 2024-10-15 RX ORDER — CITALOPRAM HYDROBROMIDE 40 MG/1
40 TABLET ORAL DAILY
Qty: 90 TABLET | Refills: 3 | Status: SHIPPED | OUTPATIENT
Start: 2024-10-15 | End: 2025-10-10

## 2024-10-15 RX ORDER — ATORVASTATIN CALCIUM 40 MG/1
40 TABLET, FILM COATED ORAL DAILY
Qty: 90 TABLET | Refills: 1 | Status: SHIPPED | OUTPATIENT
Start: 2024-10-15 | End: 2025-04-13

## 2024-10-15 RX ORDER — LEVOTHYROXINE SODIUM 100 UG/1
100 TABLET ORAL DAILY
Qty: 90 TABLET | Refills: 3 | Status: SHIPPED | OUTPATIENT
Start: 2024-10-15 | End: 2025-10-10

## 2024-10-15 SDOH — ECONOMIC STABILITY: FOOD INSECURITY: WITHIN THE PAST 12 MONTHS, YOU WORRIED THAT YOUR FOOD WOULD RUN OUT BEFORE YOU GOT MONEY TO BUY MORE.: NEVER TRUE

## 2024-10-15 SDOH — ECONOMIC STABILITY: FOOD INSECURITY: WITHIN THE PAST 12 MONTHS, THE FOOD YOU BOUGHT JUST DIDN'T LAST AND YOU DIDN'T HAVE MONEY TO GET MORE.: NEVER TRUE

## 2024-10-15 SDOH — ECONOMIC STABILITY: INCOME INSECURITY: HOW HARD IS IT FOR YOU TO PAY FOR THE VERY BASICS LIKE FOOD, HOUSING, MEDICAL CARE, AND HEATING?: NOT HARD AT ALL

## 2024-10-15 ASSESSMENT — PATIENT HEALTH QUESTIONNAIRE - PHQ9
10. IF YOU CHECKED OFF ANY PROBLEMS, HOW DIFFICULT HAVE THESE PROBLEMS MADE IT FOR YOU TO DO YOUR WORK, TAKE CARE OF THINGS AT HOME, OR GET ALONG WITH OTHER PEOPLE: VERY DIFFICULT
SUM OF ALL RESPONSES TO PHQ QUESTIONS 1-9: 15
4. FEELING TIRED OR HAVING LITTLE ENERGY: NEARLY EVERY DAY
SUM OF ALL RESPONSES TO PHQ QUESTIONS 1-9: 15
1. LITTLE INTEREST OR PLEASURE IN DOING THINGS: NEARLY EVERY DAY
SUM OF ALL RESPONSES TO PHQ9 QUESTIONS 1 & 2: 6
5. POOR APPETITE OR OVEREATING: NOT AT ALL
SUM OF ALL RESPONSES TO PHQ QUESTIONS 1-9: 15
SUM OF ALL RESPONSES TO PHQ QUESTIONS 1-9: 15
6. FEELING BAD ABOUT YOURSELF - OR THAT YOU ARE A FAILURE OR HAVE LET YOURSELF OR YOUR FAMILY DOWN: NOT AT ALL
8. MOVING OR SPEAKING SO SLOWLY THAT OTHER PEOPLE COULD HAVE NOTICED. OR THE OPPOSITE, BEING SO FIGETY OR RESTLESS THAT YOU HAVE BEEN MOVING AROUND A LOT MORE THAN USUAL: NEARLY EVERY DAY
3. TROUBLE FALLING OR STAYING ASLEEP: NOT AT ALL
9. THOUGHTS THAT YOU WOULD BE BETTER OFF DEAD, OR OF HURTING YOURSELF: NOT AT ALL
7. TROUBLE CONCENTRATING ON THINGS, SUCH AS READING THE NEWSPAPER OR WATCHING TELEVISION: NEARLY EVERY DAY
2. FEELING DOWN, DEPRESSED OR HOPELESS: NEARLY EVERY DAY

## 2024-10-15 NOTE — PATIENT INSTRUCTIONS
were heavy smokers. That means people with a smoking history of at least 20 pack years. A pack year is a way to measure how heavy a smoker you are or were.  To figure out your pack years, multiply how many packs a day on average (assuming 20 cigarettes per pack) you have smoked by how many years you have smoked. For example:  If you smoked 1 pack a day for 20 years, that's 1 times 20. So you have a smoking history of 20 pack years.  If you smoked 2 packs a day for 10 years, that's 2 times 10. So you have a smoking history of 20 pack years.  Experts agree that screening is for people who have a high risk of lung cancer. But experts don't agree on what high risk means. Some say people age 50 or older with at least a 20-pack-year smoking history are high risk. Others say it's people age 55 or older with a 30-pack-year history.  To see if you could benefit from screening, first find out if you are at high risk for lung cancer. Your doctor can help you decide your lung cancer risk.  What are the risks of screening?  CT screening for lung cancer isn't perfect. It can show an abnormal result when it turns out there wasn't any cancer. This is called a false-positive result. This means you may need more tests to make sure you don't have cancer. These tests can be harmful and cause a lot of worry.  These tests may include more CT scans and invasive testing like a lung biopsy. In a biopsy, the doctor takes a sample of tissue from inside your lung so it can be looked at under a microscope. A biopsy is the only way to tell if you have lung cancer. If the biopsy finds cancer, you and your doctor will have to decide how or whether to treat it.  Some lung cancers found on CT scans are harmless and would not have caused a problem if they had not been found through screening. But because doctors can't tell which ones will turn out to be harmless, most will be treated. This means that you may get treatment--including surgery,

## 2024-10-15 NOTE — PROGRESS NOTES
DIABETES and HYPERTENSION visit    BP Readings from Last 3 Encounters:   09/24/24 (!) 137/91   05/07/24 139/89   04/09/24 138/82        Hemoglobin A1C (%)   Date Value   09/24/2024 10.1   03/12/2024 10.8   11/08/2023 9.9     HDL (mg/dL)   Date Value   03/12/2024 43     BUN (mg/dL)   Date Value   03/12/2024 11     Creatinine (mg/dL)   Date Value   03/12/2024 0.5     Glucose (mg/dL)   Date Value   03/12/2024 182 (H)            Have you changed or started any medications since your last visit including any over-the-counter medicines, vitamins, or herbal medicines? no   Have you stopped taking any of your medications? Is so, why? -  yes - see list  Are you having any side effects from any of your medications? - no    Have you seen any other physician or provider since your last visit?  yes - Urgent care, [facility], occupational   Have you had any other diagnostic tests since your last visit?  no   Have you been seen in the emergency room and/or had an admission in a hospital since we last saw you?  no   Have you had your routine dental cleaning in the past 6 months?  no     Have you had your annual diabetic retinal (eye) exam? No   (ensure copy of exam is in the chart)    Do you have an active MyChart account? If no, what is the barrier?  Yes    Patient Care Team:  Yuly Nobles MD as PCP - General (Family Medicine)  Imani Anderson [facility] as Pharmacist (Pharmacist)    Medical History Review  Past Medical, Family, and Social History reviewed and does not contribute to the patient presenting condition    Health Maintenance   Topic Date Due    Diabetic retinal exam  Never done    Colorectal Cancer Screen  Never done    Shingles vaccine (1 of 2) Never done    Respiratory Syncytial Virus (RSV) Pregnant or age 60 yrs+ (1 - 1-dose 60+ series) Never done    Lung Cancer Screening &/or Counseling  08/18/2022    Diabetic foot exam  10/25/2023    Annual Wellness Visit (Medicare Advantage)  01/01/2024    COVID-19 Vaccine (1 -

## 2024-10-15 NOTE — PROGRESS NOTES
Attending Physician Statement  I  have discussed the care of Juany Vazquez including pertinent history and exam findings with the resident. I agree with the assessment, plan and orders as documented by the resident.      /69 (Site: Left Upper Arm, Position: Sitting, Cuff Size: Medium Adult)   Pulse 82   Temp 99.3 °F (37.4 °C) (Temporal)   Ht 1.6 m (5' 2.99\")   Wt 73.8 kg (162 lb 12.8 oz)   SpO2 97%   BMI 28.85 kg/m²    BP Readings from Last 3 Encounters:   10/15/24 125/69   09/24/24 (!) 137/91   05/07/24 139/89     Wt Readings from Last 3 Encounters:   10/15/24 73.8 kg (162 lb 12.8 oz)   09/24/24 71.2 kg (157 lb)   05/07/24 72.2 kg (159 lb 3.2 oz)          Diagnosis Orders   1. Diabetes mellitus due to underlying condition with hyperosmolarity without coma, unspecified whether long term insulin use (HCC)        2. Type 2 diabetes mellitus without complication, without long-term current use of insulin (HCC)        3. Hyperglycemia        4. Uncontrolled type 2 diabetes mellitus with hyperglycemia (HCC)        5. Hypothyroidism due to Hashimoto's thyroiditis        6. Essential hypertension        7. Vertigo        8. Mixed hyperlipidemia        9. Depression, unspecified depression type                Dannielle Marks DO 10/15/2024 10:49 AM

## 2024-10-15 NOTE — PROGRESS NOTES
[facility] Residency Program - Outpatient Note      Subjective:    Juany Vazquez is a 69 y.o. female with  has no past medical history on file.    Presented to the office today for:  Chief Complaint   Patient presents with    Fall     Right side ribs bruised, painful     Health Maintenance     No dm eye exam, positive phq-9     Hypertension    Sore     Sores on top of head        HPI  Patient here for diabetes mellitus follow-up.  Vital stable blood pressure 125/69, pulse 82 today    T2DM  A1c 10.1 (September 2024).  Patient taking metformin and glipizide.  Patient unable to really take Farxiga because of high co-pay.  Talked about potential for insulin and CGM, patient denies and does not want insulin therapy.  Discussed about compliance with diet and medications and potential for starting Ozempic, for which pharmacy will follow-up with patient and assist in getting it.    Patient denies any hypoglycemia, polydipsia or any other hypoglycemic or hyperglycemic related symptoms.      History of fall -bruise right rib  Patient experienced a fall yesterday. Denies dizziness, lightheadedness or palpitations or diaphoresis before fall.  Patient did not hit her head, but bruised her right sided rib.  Patient reports improved bruising and pain but pain still present on palpation.  No difficulty in breathing    Patient does have history of falls, sometimes associated with dizziness but not this episode.    Concerns for falls likely secondary to either hypoglycemia, diabetic neuropathy, medication side effect(meclizine) or hypothyroidism    Patient has no other active complaints      Review of Systems    Constitutional:  Negative for chills, diaphoresis, fatigue and fever.   Respiratory:  Negative for cough, shortness of breath and stridor.    Cardiovascular:  Negative for chest pain and leg swelling.   Gastrointestinal:  Negative for abdominal distention, constipation, nausea and vomiting.

## 2025-01-13 ENCOUNTER — OFFICE VISIT (OUTPATIENT)
Dept: FAMILY MEDICINE CLINIC | Age: 70
End: 2025-01-13
Payer: MEDICARE

## 2025-01-13 VITALS
BODY MASS INDEX: 28.92 KG/M2 | RESPIRATION RATE: 16 BRPM | WEIGHT: 163.2 LBS | OXYGEN SATURATION: 94 % | TEMPERATURE: 97 F | HEIGHT: 63 IN | DIASTOLIC BLOOD PRESSURE: 70 MMHG | HEART RATE: 97 BPM | SYSTOLIC BLOOD PRESSURE: 112 MMHG

## 2025-01-13 DIAGNOSIS — R33.9 URINARY RETENTION: Primary | ICD-10-CM

## 2025-01-13 PROCEDURE — 99213 OFFICE O/P EST LOW 20 MIN: CPT

## 2025-01-13 PROCEDURE — 1123F ACP DISCUSS/DSCN MKR DOCD: CPT

## 2025-01-13 PROCEDURE — 3074F SYST BP LT 130 MM HG: CPT

## 2025-01-13 PROCEDURE — 3078F DIAST BP <80 MM HG: CPT

## 2025-01-13 PROCEDURE — 1159F MED LIST DOCD IN RCRD: CPT

## 2025-01-13 PROCEDURE — 81002 URINALYSIS NONAUTO W/O SCOPE: CPT

## 2025-01-13 RX ORDER — CEFDINIR 300 MG/1
300 CAPSULE ORAL 2 TIMES DAILY
Qty: 14 CAPSULE | Refills: 0 | Status: SHIPPED | OUTPATIENT
Start: 2025-01-13 | End: 2025-01-20

## 2025-01-13 SDOH — ECONOMIC STABILITY: FOOD INSECURITY: WITHIN THE PAST 12 MONTHS, YOU WORRIED THAT YOUR FOOD WOULD RUN OUT BEFORE YOU GOT MONEY TO BUY MORE.: NEVER TRUE

## 2025-01-13 SDOH — ECONOMIC STABILITY: FOOD INSECURITY: WITHIN THE PAST 12 MONTHS, THE FOOD YOU BOUGHT JUST DIDN'T LAST AND YOU DIDN'T HAVE MONEY TO GET MORE.: NEVER TRUE

## 2025-01-13 ASSESSMENT — ENCOUNTER SYMPTOMS
WHEEZING: 0
ABDOMINAL PAIN: 0
VOMITING: 0
CONSTIPATION: 0
RHINORRHEA: 0
DIARRHEA: 0
TROUBLE SWALLOWING: 0
SHORTNESS OF BREATH: 0
NAUSEA: 0

## 2025-01-13 NOTE — PROGRESS NOTES
Subjective:    Juany Vazquez is a 69 y.o. female with  has no past medical history on file.    Presented to the office today for:  Chief Complaint   Patient presents with    Urinary Tract Infection     Patient presents today with what she feels like is a UTI. This is her normal presentation. Difficulty emptying her bladder, no burning, and no increased frequency. Also states she has a sharp pain in her Right Flank. This has been ongoing for the past 2 days.       HPI    Patient presents today for an acute care visit    UTI  Has difficulty urinating, started 1 am yesterday morning    Taking Uristat OTC, last day today, mild improvement in symptoms   She mentions having dribbling which started yesterday  Symptoms have occurred before which resolved   Urine is orange in coloration, no blood in urine   No significant dysuria, urgency present, R flank pain   Mentions having chills, not constant, no N/V  Previously established with nephro and uro - unable to find records  Hx of renal stones 2013?    Recent medication change of Glipizide to once daily     Review of Systems   Constitutional:  Negative for chills and fever.   HENT:  Negative for rhinorrhea, sneezing and trouble swallowing.    Respiratory:  Negative for shortness of breath and wheezing.    Cardiovascular:  Negative for chest pain, palpitations and leg swelling.   Gastrointestinal:  Negative for abdominal pain, constipation, diarrhea, nausea and vomiting.   Genitourinary:  Positive for difficulty urinating and urgency. Negative for dysuria, flank pain, frequency, hematuria, vaginal bleeding, vaginal discharge and vaginal pain.   Musculoskeletal:  Negative for arthralgias.   Neurological:  Negative for light-headedness and headaches.   Psychiatric/Behavioral:  Negative for agitation and behavioral problems.      The patient has a No family history on file.    Objective:    /70 (Site: Left Upper Arm, Position: Sitting)   Pulse 97   Temp 97 °F (36.1 °C)

## 2025-01-17 ENCOUNTER — OFFICE VISIT (OUTPATIENT)
Dept: FAMILY MEDICINE CLINIC | Age: 70
End: 2025-01-17
Payer: MEDICARE

## 2025-01-17 VITALS
DIASTOLIC BLOOD PRESSURE: 72 MMHG | SYSTOLIC BLOOD PRESSURE: 124 MMHG | HEART RATE: 82 BPM | OXYGEN SATURATION: 95 % | BODY MASS INDEX: 28.77 KG/M2 | WEIGHT: 162.4 LBS | HEIGHT: 63 IN

## 2025-01-17 DIAGNOSIS — N30.00 ACUTE CYSTITIS WITHOUT HEMATURIA: Primary | ICD-10-CM

## 2025-01-17 DIAGNOSIS — R33.9 URINARY RETENTION: ICD-10-CM

## 2025-01-17 LAB
BACTERIA URINE, POC: ABNORMAL
BILIRUBIN URINE: ABNORMAL
BLOOD, URINE: POSITIVE
CASTS URINE, POC: ABNORMAL
CLARITY, UA: CLEAR
COLOR, UA: YELLOW
CRYSTALS URINE, POC: ABNORMAL
EPI CELLS URINE, POC: ABNORMAL
GLUCOSE URINE: 100
KETONES, URINE: POSITIVE
LEUKOCYTE EST, POC: ABNORMAL
NITRITE, URINE: NEGATIVE
PH UA: 5.5 (ref 4.5–8)
PROTEIN UA: ABNORMAL
RBC URINE, POC: ABNORMAL
SPECIFIC GRAVITY UA: 1.03 (ref 1–1.03)
UROBILINOGEN, URINE: ABNORMAL
WBC URINE, POC: ABNORMAL
YEAST URINE, POC: ABNORMAL

## 2025-01-17 PROCEDURE — 81000 URINALYSIS NONAUTO W/SCOPE: CPT

## 2025-01-17 ASSESSMENT — PATIENT HEALTH QUESTIONNAIRE - PHQ9
5. POOR APPETITE OR OVEREATING: NOT AT ALL
6. FEELING BAD ABOUT YOURSELF - OR THAT YOU ARE A FAILURE OR HAVE LET YOURSELF OR YOUR FAMILY DOWN: NOT AT ALL
SUM OF ALL RESPONSES TO PHQ QUESTIONS 1-9: 0
1. LITTLE INTEREST OR PLEASURE IN DOING THINGS: NOT AT ALL
7. TROUBLE CONCENTRATING ON THINGS, SUCH AS READING THE NEWSPAPER OR WATCHING TELEVISION: NOT AT ALL
SUM OF ALL RESPONSES TO PHQ QUESTIONS 1-9: 0
SUM OF ALL RESPONSES TO PHQ9 QUESTIONS 1 & 2: 0
9. THOUGHTS THAT YOU WOULD BE BETTER OFF DEAD, OR OF HURTING YOURSELF: NOT AT ALL
2. FEELING DOWN, DEPRESSED OR HOPELESS: NOT AT ALL
4. FEELING TIRED OR HAVING LITTLE ENERGY: NOT AT ALL
8. MOVING OR SPEAKING SO SLOWLY THAT OTHER PEOPLE COULD HAVE NOTICED. OR THE OPPOSITE, BEING SO FIGETY OR RESTLESS THAT YOU HAVE BEEN MOVING AROUND A LOT MORE THAN USUAL: NOT AT ALL
3. TROUBLE FALLING OR STAYING ASLEEP: NOT AT ALL
SUM OF ALL RESPONSES TO PHQ QUESTIONS 1-9: 0
10. IF YOU CHECKED OFF ANY PROBLEMS, HOW DIFFICULT HAVE THESE PROBLEMS MADE IT FOR YOU TO DO YOUR WORK, TAKE CARE OF THINGS AT HOME, OR GET ALONG WITH OTHER PEOPLE: NOT DIFFICULT AT ALL
SUM OF ALL RESPONSES TO PHQ QUESTIONS 1-9: 0

## 2025-01-17 ASSESSMENT — ENCOUNTER SYMPTOMS
WHEEZING: 0
ABDOMINAL PAIN: 0
VOMITING: 0
CONSTIPATION: 0
SHORTNESS OF BREATH: 0
NAUSEA: 0
DIARRHEA: 0

## 2025-01-17 NOTE — PATIENT INSTRUCTIONS
Thank you for letting us take care of you today. We hope all your questions were addressed. If a question was overlooked or something else comes to mind after you return home, please contact a member of your Care Team listed below.      Your Care Team at [facility] is Team #  Ayush Gotti M.D. (Faculty)  Moris Santana M.D. (Resident)  Jeannette Jiménez M.D. (Resident)   Belle So M.D. (Resident)  Maximo Olivas M.D. (Resident)  Susan Quiñones M.D. (Resident)  Paige Francisco., [facility]  Danay Harper, [facility]  Fatou Tompkins, [facility]  Jean Claude Khalil, [facility]  Cinthia Sethi, [facility]  Atiya Partida, [facility]  Radha Mendes (LJ) ANNE-MARIE Farmer (Clinical Practice Manager)  Imani Anderson [facility] (Clinical Pharmacist)     Office phone number: [phone]    If you need to get in right away due to illness, please be advised we have \"Same Day\" appointments available Monday-Friday. Please call us at [phone] option #3 to schedule your \"Same Day\" appointment.     
n/a

## 2025-01-17 NOTE — PROGRESS NOTES
Attending Physician Statement  I have discussed the care of Juany Vazquez, including pertinent history and exam findings,  with the resident. I have reviewed the key elements of all parts of the encounter with the resident.  I agree with the assessment, plan and orders as documented by the resident.  (GE Modifier)    Cammie Call MD  
Visit Information    Have you changed or started any medications since your last visit including any over-the-counter medicines, vitamins, or herbal medicines? no   Have you stopped taking any of your medications? Is so, why? -  no  Are you having any side effects from any of your medications? - no    Have you seen any other physician or provider since your last visit?  no   Have you had any other diagnostic tests since your last visit?  no   Have you been seen in the emergency room and/or had an admission in a hospital since we last saw you?  no   Have you had your routine dental cleaning in the past 6 months?  no     Do you have an active MyChart account? If no, what is the barrier?  Yes    Patient Care Team:  Maximo Olivas MD as PCP - General (Family Medicine)  Imani Anderson RPH as Pharmacist (Pharmacist)    Medical History Review  Past Medical, Family, and Social History reviewed and does contribute to the patient presenting condition    Health Maintenance   Topic Date Due    Diabetic retinal exam  Never done    Colorectal Cancer Screen  Never done    Shingles vaccine (1 of 2) Never done    Lung Cancer Screening &/or Counseling  08/18/2022    Diabetic foot exam  10/25/2023    COVID-19 Vaccine (1 - 2023-24 season) Never done    Annual Wellness Visit (Medicare Advantage)  01/01/2025    A1C test (Diabetic or Prediabetic)  12/24/2024    Diabetic Alb to Cr ratio (uACR) test  03/12/2025    Lipids  03/12/2025    GFR test (Diabetes, CKD 3-4, OR last GFR 15-59)  03/12/2025    Breast cancer screen  06/27/2025    Depression Monitoring  10/15/2025    Respiratory Syncytial Virus (RSV) Pregnant or age 60 yrs+ (1 - 1-dose 75+ series) 10/11/2030    DTaP/Tdap/Td vaccine (3 - Td or Tdap) 07/07/2033    DEXA (modify frequency per FRAX score)  Completed    Flu vaccine  Completed    Pneumococcal 65+ years Vaccine  Completed    Hepatitis C screen  Completed    Hepatitis A vaccine  Aged Out    Hepatitis B vaccine  Aged Out    Hib 
behaviors: nutrition, exercise and medication adherence    Discussed use,benefit, and side effects of prescribed medications.  Barriers to medication compliance addressed.      All patient questions answered.  Pt voiced understanding.     Return in about 3 days (around 1/20/2025), or if symptoms worsen or fail to improve.        Disclaimer: Some orall of this note was transcribed using voice-recognition software.This may cause typographical errors occasionally. Although all effort is made to fix these errors, please do not hesitate to contact our office if there isany concern with the understanding of this note.

## 2025-01-28 ENCOUNTER — OFFICE VISIT (OUTPATIENT)
Dept: FAMILY MEDICINE CLINIC | Age: 70
End: 2025-01-28
Payer: MEDICARE

## 2025-01-28 VITALS
TEMPERATURE: 98.1 F | WEIGHT: 163 LBS | SYSTOLIC BLOOD PRESSURE: 130 MMHG | BODY MASS INDEX: 28.87 KG/M2 | HEART RATE: 74 BPM | DIASTOLIC BLOOD PRESSURE: 90 MMHG

## 2025-01-28 DIAGNOSIS — B37.31 VAGINAL CANDIDIASIS: ICD-10-CM

## 2025-01-28 DIAGNOSIS — I10 ESSENTIAL HYPERTENSION: ICD-10-CM

## 2025-01-28 DIAGNOSIS — E11.65 UNCONTROLLED TYPE 2 DIABETES MELLITUS WITH HYPERGLYCEMIA (HCC): Primary | ICD-10-CM

## 2025-01-28 DIAGNOSIS — R91.1 PULMONARY NODULE: ICD-10-CM

## 2025-01-28 LAB — HBA1C MFR BLD: 12.9 %

## 2025-01-28 PROCEDURE — 83036 HEMOGLOBIN GLYCOSYLATED A1C: CPT

## 2025-01-28 RX ORDER — ATORVASTATIN CALCIUM 40 MG/1
40 TABLET, FILM COATED ORAL DAILY
Qty: 90 TABLET | Refills: 1 | Status: SHIPPED | OUTPATIENT
Start: 2025-01-28 | End: 2025-07-27

## 2025-01-28 RX ORDER — ALBUTEROL SULFATE 90 UG/1
2 INHALANT RESPIRATORY (INHALATION) 4 TIMES DAILY PRN
Qty: 18 G | Refills: 0 | Status: SHIPPED | OUTPATIENT
Start: 2025-01-28

## 2025-01-28 RX ORDER — FLUCONAZOLE 150 MG/1
150 TABLET ORAL DAILY
Qty: 3 TABLET | Refills: 0 | Status: SHIPPED | OUTPATIENT
Start: 2025-01-28 | End: 2025-01-31

## 2025-01-28 RX ORDER — DAPAGLIFLOZIN 5 MG/1
5 TABLET, FILM COATED ORAL EVERY MORNING
Qty: 90 TABLET | Refills: 1 | Status: CANCELLED | OUTPATIENT
Start: 2025-01-28

## 2025-01-28 RX ORDER — AMLODIPINE BESYLATE 5 MG/1
5 TABLET ORAL DAILY
Qty: 90 TABLET | Refills: 0 | Status: SHIPPED | OUTPATIENT
Start: 2025-01-28 | End: 2025-04-28

## 2025-01-28 ASSESSMENT — ENCOUNTER SYMPTOMS
SHORTNESS OF BREATH: 0
CHEST TIGHTNESS: 0

## 2025-01-28 NOTE — PROGRESS NOTES
Diabetic visit information    BP Readings from Last 3 Encounters:   01/17/25 124/72   01/13/25 112/70   10/15/24 125/69       Hemoglobin A1C (%)   Date Value   09/24/2024 10.1   03/12/2024 10.8   11/08/2023 9.9               Have you changed or started any medications since your last visit including any over-the-counter medicines, vitamins, or herbal medicines? no   Have you stopped taking any of your medications? Is so, why? -  no  Are you having any side effects from any of your medications? - no    Have you seen any other physician or provider since your last visit?  no   Have you had any other diagnostic tests since your last visit?  no   Have you been seen in the emergency room and/or had an admission in a hospital since we last saw you?  no     Have you had your annual diabetic retinal (eye) exam? No   (ensure copy of exam is in the chart)    Have you had your routine dental cleaning in the past 6 months? no    Do you have an active [employer] account?  If not, what are your barriers?  Yes    Patient Care Team:  Maximo Olivas MD as PCP - General (Family Medicine)  Imani Anderson NIYA as Pharmacist (Pharmacist)    Medical history Review  Past Medical, Family, and Social History reviewed and does not contribute to the patient presenting condition.    Health Maintenance   Topic Date Due    Diabetic retinal exam  Never done    Colorectal Cancer Screen  Never done    Shingles vaccine (1 of 2) Never done    Lung Cancer Screening &/or Counseling  08/18/2022    Diabetic foot exam  10/25/2023    COVID-19 Vaccine (1 - 2023-24 season) Never done    A1C test (Diabetic or Prediabetic)  12/24/2024    Annual Wellness Visit (Medicare Advantage)  01/01/2025    Diabetic Alb to Cr ratio (uACR) test  03/12/2025    Lipids  03/12/2025    GFR test (Diabetes, CKD 3-4, OR last GFR 15-59)  03/12/2025    Breast cancer screen  06/27/2025    Depression Monitoring  01/17/2026    Respiratory Syncytial Virus (RSV) Pregnant or age 60 yrs+ (1 -

## 2025-01-28 NOTE — PROGRESS NOTES
[facility] Residency Program - Outpatient Note      Subjective:    Juany Vazquez is a 69 y.o. female with  has no past medical history on file.    Presented to the office today for:  Chief Complaint   Patient presents with    Diabetes     Patient here to check her diabetes    Vaginitis     Patient she is having itching and problem.       HPI  Patient presents for vaginal itching and diabetes mellitus follow-up      Vaginal itching  Patient reports vaginal itching going for more than a week, no associated discharge, sores numbness, redness, bleed or any other abnormality.  Patient is not sexually active and denies postcoital bleeding or dyspareunia      DM  A1c 12.9 today, increased from 10  Patient was not able to get in contact with pharmacist to start Ozempic.  Patient not taking Farxiga due to co-pay issues.  Patient is on glipizide and metformin.  Denies polyuria polydipsia or any other associated hyper or hypoglycemia symptoms    Patient is on extended release glipizide due to increased fall risk with normal is glipizide      Review of Systems   Constitutional:  Negative for chills, fatigue and fever.   Respiratory:  Negative for chest tightness and shortness of breath.    Cardiovascular:  Negative for chest pain.   Endocrine: Negative for polydipsia, polyphagia and polyuria.   Genitourinary:  Negative for dyspareunia, flank pain, hematuria, vaginal bleeding, vaginal discharge and vaginal pain.         The patient has a No family history on file.    Objective:    BP (!) 130/91 (Site: Left Upper Arm, Position: Sitting, Cuff Size: Medium Adult)   Pulse 74   Temp 98.1 °F (36.7 °C) (Oral)   Wt 73.9 kg (163 lb)   BMI 28.87 kg/m²    BP Readings from Last 3 Encounters:   01/28/25 (!) 130/91   01/17/25 124/72   01/13/25 112/70       Physical Exam  Vitals reviewed.   Cardiovascular:      Rate and Rhythm: Normal rate.      Pulses: Normal pulses.      Heart sounds: Normal heart

## 2025-01-28 NOTE — PROGRESS NOTES
ATTENDING NOTE    I have reviewed osei elements of Juany Vazquez history and exam. I have reviewed that chart. And I examined Juany Vazquez   I have discussed the treatment plan with the resident and agree with the plan.  GC modifier.     No past medical history on file.    Vitals:    01/28/25 1117   BP: (!) 130/90   Pulse:    Temp:        During interview with patient it was revealed she isn't actually taking SGLT 2. Also, she stated symptoms of irritation or itching of urogenital area are mild. I discussed concerns for infection but she sill declined exam today.    Reviewed urgent symptoms and pt expressed understanding. Patient was able to \"teach back\" to me the points of our discussion.    Juany was seen today for diabetes and vaginitis.    Diagnoses and all orders for this visit:    Vaginal candidiasis  -     fluconazole (DIFLUCAN) 150 MG tablet; Take 1 tablet by mouth daily for 3 days    Uncontrolled type 2 diabetes mellitus with hyperglycemia (HCC)  -     POCT glycosylated hemoglobin (Hb A1C)  -     [facility] Pharmacist    Essential hypertension  -     amLODIPine (NORVASC) 5 MG tablet; Take 1 tablet by mouth daily    Pulmonary nodule  -     CT CHEST HIGH RESOLUTION; Future    Other orders  -     albuterol sulfate HFA (VENTOLIN HFA) 108 (90 Base) MCG/ACT inhaler; Inhale 2 puffs into the lungs 4 times daily as needed for Wheezing  -     atorvastatin (LIPITOR) 40 MG tablet; Take 1 tablet by mouth daily

## 2025-03-04 ENCOUNTER — OFFICE VISIT (OUTPATIENT)
Dept: FAMILY MEDICINE CLINIC | Age: 70
End: 2025-03-04
Payer: MEDICARE

## 2025-03-04 VITALS
BODY MASS INDEX: 28.52 KG/M2 | SYSTOLIC BLOOD PRESSURE: 109 MMHG | HEART RATE: 90 BPM | DIASTOLIC BLOOD PRESSURE: 73 MMHG | WEIGHT: 161 LBS | TEMPERATURE: 97.6 F

## 2025-03-04 DIAGNOSIS — F33.9 RECURRENT MAJOR DEPRESSIVE DISORDER, REMISSION STATUS UNSPECIFIED: ICD-10-CM

## 2025-03-04 DIAGNOSIS — I10 ESSENTIAL HYPERTENSION: ICD-10-CM

## 2025-03-04 DIAGNOSIS — E11.9 TYPE 2 DIABETES MELLITUS WITHOUT COMPLICATION, WITHOUT LONG-TERM CURRENT USE OF INSULIN (HCC): Primary | ICD-10-CM

## 2025-03-04 DIAGNOSIS — R53.83 OTHER FATIGUE: ICD-10-CM

## 2025-03-04 LAB — HBA1C MFR BLD: 12 %

## 2025-03-04 PROCEDURE — 83036 HEMOGLOBIN GLYCOSYLATED A1C: CPT

## 2025-03-04 PROCEDURE — 99211 OFF/OP EST MAY X REQ PHY/QHP: CPT

## 2025-03-04 RX ORDER — AMLODIPINE BESYLATE 5 MG/1
5 TABLET ORAL DAILY
Qty: 90 TABLET | Refills: 0 | Status: SHIPPED | OUTPATIENT
Start: 2025-03-04 | End: 2025-06-02

## 2025-03-04 ASSESSMENT — PATIENT HEALTH QUESTIONNAIRE - PHQ9
8. MOVING OR SPEAKING SO SLOWLY THAT OTHER PEOPLE COULD HAVE NOTICED. OR THE OPPOSITE, BEING SO FIGETY OR RESTLESS THAT YOU HAVE BEEN MOVING AROUND A LOT MORE THAN USUAL: NOT AT ALL
SUM OF ALL RESPONSES TO PHQ QUESTIONS 1-9: 2
10. IF YOU CHECKED OFF ANY PROBLEMS, HOW DIFFICULT HAVE THESE PROBLEMS MADE IT FOR YOU TO DO YOUR WORK, TAKE CARE OF THINGS AT HOME, OR GET ALONG WITH OTHER PEOPLE: SOMEWHAT DIFFICULT
5. POOR APPETITE OR OVEREATING: NOT AT ALL
SUM OF ALL RESPONSES TO PHQ QUESTIONS 1-9: 2
1. LITTLE INTEREST OR PLEASURE IN DOING THINGS: NOT AT ALL
2. FEELING DOWN, DEPRESSED OR HOPELESS: NOT AT ALL
4. FEELING TIRED OR HAVING LITTLE ENERGY: SEVERAL DAYS
SUM OF ALL RESPONSES TO PHQ QUESTIONS 1-9: 2
3. TROUBLE FALLING OR STAYING ASLEEP: SEVERAL DAYS
SUM OF ALL RESPONSES TO PHQ QUESTIONS 1-9: 2
6. FEELING BAD ABOUT YOURSELF - OR THAT YOU ARE A FAILURE OR HAVE LET YOURSELF OR YOUR FAMILY DOWN: NOT AT ALL
9. THOUGHTS THAT YOU WOULD BE BETTER OFF DEAD, OR OF HURTING YOURSELF: NOT AT ALL
7. TROUBLE CONCENTRATING ON THINGS, SUCH AS READING THE NEWSPAPER OR WATCHING TELEVISION: NOT AT ALL

## 2025-03-04 ASSESSMENT — ANXIETY QUESTIONNAIRES
4. TROUBLE RELAXING: NOT AT ALL
5. BEING SO RESTLESS THAT IT IS HARD TO SIT STILL: NOT AT ALL
GAD7 TOTAL SCORE: 2
3. WORRYING TOO MUCH ABOUT DIFFERENT THINGS: SEVERAL DAYS
6. BECOMING EASILY ANNOYED OR IRRITABLE: NOT AT ALL
1. FEELING NERVOUS, ANXIOUS, OR ON EDGE: SEVERAL DAYS
7. FEELING AFRAID AS IF SOMETHING AWFUL MIGHT HAPPEN: NOT AT ALL
2. NOT BEING ABLE TO STOP OR CONTROL WORRYING: NOT AT ALL

## 2025-03-04 ASSESSMENT — ENCOUNTER SYMPTOMS
CHEST TIGHTNESS: 0
DIARRHEA: 0
VOMITING: 0
SHORTNESS OF BREATH: 0
NAUSEA: 0

## 2025-03-04 NOTE — PATIENT INSTRUCTIONS
Thank you for letting us take care of you today. We hope all your questions were addressed. If a question was overlooked or something else comes to mind after you return home, please contact a member of your Care Team listed below.      Your Care Team at [facility] is Team #  Ayush Gotti M.D. (Faculty)  Moris Santana M.D. (Resident)  Jeannette Jiménez M.D. (Resident)   Belle So M.D. (Resident)  Maximo Olivas M.D. (Resident)  Susan Quiñones M.D. (Resident)  Paige Francisco., [facility]  Danay Harper, [facility]  Fatou Tompkins, [facility]  Jean Claude Khalil, [facility]  Cinthia Sethi, [facility]  Atiya Partida, [facility]  Radha Mendes (LJ) ANNE-MARIE Farmer (Clinical Practice Manager)  Imani Anderson [facility] (Clinical Pharmacist)     Office phone number: [phone]    If you need to get in right away due to illness, please be advised we have \"Same Day\" appointments available Monday-Friday. Please call us at [phone] option #3 to schedule your \"Same Day\" appointment.

## 2025-03-12 ENCOUNTER — SCHEDULED TELEPHONE ENCOUNTER (OUTPATIENT)
Dept: FAMILY MEDICINE CLINIC | Age: 70
End: 2025-03-12

## 2025-03-12 DIAGNOSIS — E11.65 UNCONTROLLED TYPE 2 DIABETES MELLITUS WITH HYPERGLYCEMIA (HCC): Primary | ICD-10-CM

## 2025-03-12 PROCEDURE — 99999 PR OFFICE/OUTPT VISIT,PROCEDURE ONLY: CPT | Performed by: PHARMACIST

## 2025-03-12 PROCEDURE — APPNB45 APP NON BILLABLE 31-45 MINUTES

## 2025-03-12 NOTE — PROGRESS NOTES
Medication Management Service ([facility])  [facility]  [phone]    03/12/25 3:57 PM    CLINICAL PHARMACY NOTE:    PharmGAMALIEL was consulted for diabetes management and cost navigation for patient's diabetes medications. Referral for diabetes management under collaborative practice placed. For today, main focus will be navigating cost and affordability options for the patient. Previous notes explain we have been trying to get her on SGLT2i and GLP-1 RA therapy, but she cannot afford them.     PharmGAMALIEL called patient to investigate PAP eligibility. From her report, her and her  made ~$80,000 in 2024 (household size of 2). This would make her likely ineligible for Farxiga or Jardiance PAP. However, appears Ozempic PAP income requirements is an income below ~$84,600, which she would qualify for. PharmD attempted to submit Ozempic PAP online, however, the application rejected due to thinking she may have an insurance that would make her ineligible. She currently has Medicare with a Part D supplement, so she should not have any issues pertaining to her insurance.    As of now, Yolanda printed a paper PAP application and had Dr. Gotti sign the provider portion. We will need to have her sign her portions in office. Patient explains she can come in tomorrow, 03/13/2025 to sign the application. She will also be bringing in her IRS Form 1040 so we can have it on file in case [email or web address] asks for proof of income.     Yolanda will create a separate encounter with the PAP details once the patient signs her portion and brings the income documents.     Next PharmD Visit: 03/13/2025 @ 8:00 AM, In-Person Consult to sign PAP application  Next PCP Visit: 05/06/2025 w/ Dr. Deisy Agustin PharmD ([place])  PGY2 Ambulatory Care Pharmacy Resident  [facility] Medication Management Service  [phone]  03/12/25  =======================================  For Pharmacy Admin Tracking Only    Program: Medical Group  CPA in

## 2025-03-13 ENCOUNTER — PHARMACY VISIT (OUTPATIENT)
Dept: FAMILY MEDICINE CLINIC | Age: 70
End: 2025-03-13

## 2025-03-13 DIAGNOSIS — E11.9 TYPE 2 DIABETES MELLITUS WITHOUT COMPLICATION, WITHOUT LONG-TERM CURRENT USE OF INSULIN (HCC): Primary | ICD-10-CM

## 2025-03-13 PROCEDURE — APPNB45 APP NON BILLABLE 31-45 MINUTES

## 2025-03-13 PROCEDURE — 99999 PR OFFICE/OUTPT VISIT,PROCEDURE ONLY: CPT | Performed by: PHARMACIST

## 2025-03-13 NOTE — PATIENT INSTRUCTIONS
On Monday or Tuesday, call [facility] at [phone] to make sure they received everything they need for your Ozempic application  Give us a call (Ignacio or Imani) to keep us updated at [phone]

## 2025-03-13 NOTE — PROGRESS NOTES
Medication Management Service ([facility])  [facility]  [phone]    03/13/25 8:14 AM    Pharmacist Consult - Medication Patient Assistance Program    Patient's PCP: Dr. Olivas    Collaborative practice agreement in place with provider for diabetes management    PCP Office:   [facility] Medication Management Service  [address] [place] [address]  Pharmacist Phone: [phone]  Clinic Phone: [phone]    Juany Vazquez is a 69 y.o. female. PharmD investigating eligibility of patient to receive Ozempic through [employer] (P: [phone]).  Directions: Dial and inject 0.25 mg weekly for four weeks, then increase to 0.5 mg weekly thereafter (PAP application filled out for 0.5 mg weekly; patient is aware to start at 0.25 mg weekly when we first start the medication)    Patient Phone: [phone]  Patient Address: [address] [place] [address] [address], [place] [address]     Application Type: Initial for 2025    Application completed on behalf of Dr. Gotti and completed on paper. Patient signed application in office.    Application filled out, Patient portion signed, Provider portion signed, and Application faxed to [employer] [[phone]]    Educated patient to call [employer] (P: [phone]) on 03/17/2025 or 03/18/2025 to verify they have received everything they need to move forward with the program and to call us to keep us updated on what she finds out. Educated that if approved and we receive the medication, to hold off on starting it until we talk next. Patient verbalized understanding and had no further questions or concerns on this for me at this time.    Next PharmD Visit: 04/22/2025 @ 1:00 PM, In-Person Consult; initial DM, CMR, and hopefully Ozempic education  Next PCP Visit: 05/06/2025 w/ Dr. Deisy Agustin PharmD ([place])  =====================================================  For Pharmacy Admin Tracking Only    Program: Medical Group  CPA in place:  Yes  Recommendation Provided To:

## 2025-03-21 ENCOUNTER — TELEPHONE (OUTPATIENT)
Dept: FAMILY MEDICINE CLINIC | Age: 70
End: 2025-03-21

## 2025-03-21 NOTE — TELEPHONE ENCOUNTER
Medication Management Service ([facility])  [facility]  [phone]    03/21/25 3:45 PM    CLINICAL PHARMACY NOTE:    PharmD attempted to call patient to see if she was able to call [employer] to check the status of her Ozempic PAP application. She did not answer her phone for today. Unable to leave voicemail for her to call us back as voicemail was full. PharmD will attempt to call at a later date.    Ilir Agustin PharmD ([place])  PGY2 Ambulatory Care Pharmacy Resident  [facility] Medication Management Service  [phone]  03/21/25  =======================================  For Pharmacy Admin Tracking Only    Program: Medical Group  CPA in place:  Yes  Time Spent (min): 10

## 2025-03-24 DIAGNOSIS — I10 ESSENTIAL HYPERTENSION: ICD-10-CM

## 2025-03-24 NOTE — TELEPHONE ENCOUNTER
Last visit: 3/4/25  Last Med refill: 10/15/24  Does patient have enough medication for 72 hours: no    Next Visit Date:5/6/25  Future Appointments   Date Time Provider Department Center   4/22/2025  1:00 PM Imani Anderson RPH [facility] DEP   5/6/2025  3:00 PM Maximo Olivas MD [facility] DEP       Health Maintenance   Topic Date Due    Diabetic retinal exam  Never done    Colorectal Cancer Screen  Never done    Shingles vaccine (1 of 2) Never done    Respiratory Syncytial Virus (RSV) Pregnant or age 60 yrs+ (1 - Risk 60-74 years 1-dose series) Never done    Lung Cancer Screening &/or Counseling  08/18/2022    Diabetic foot exam  10/25/2023    COVID-19 Vaccine (1 - 2024-25 season) Never done    Annual Wellness Visit (Medicare Advantage)  01/01/2025    Diabetic Alb to Cr ratio (uACR) test  03/12/2025    Lipids  03/12/2025    GFR test (Diabetes, CKD 3-4, OR last GFR 15-59)  03/12/2025    A1C test (Diabetic or Prediabetic)  06/04/2025    Breast cancer screen  06/27/2025    Depression Monitoring  03/04/2026    DTaP/Tdap/Td vaccine (3 - Td or Tdap) 07/07/2033    DEXA (modify frequency per FRAX score)  Completed    Flu vaccine  Completed    Pneumococcal 50+ years Vaccine  Completed    Hepatitis C screen  Completed    Hepatitis A vaccine  Aged Out    Hepatitis B vaccine  Aged Out    Hib vaccine  Aged Out    Polio vaccine  Aged Out    Meningococcal (ACWY) vaccine  Aged Out    Meningococcal B vaccine  Aged Out    Pneumococcal 0-49 years Vaccine  Discontinued    Diabetes screen  Discontinued    HIV screen  Discontinued       Hemoglobin A1C (%)   Date Value   03/04/2025 12.0   01/28/2025 12.9   09/24/2024 10.1             ( goal A1C is < 7)   No components found for: \"LABMICR\"  No components found for: \"LDLCHOLESTEROL\", \"LDLCALC\"    (goal LDL is <100)   AST (U/L)   Date Value   03/12/2024 15     ALT (U/L)   Date Value   03/12/2024 11     BUN (mg/dL)   Date Value   03/12/2024 11     BP Readings from Last 3

## 2025-03-25 RX ORDER — LISINOPRIL 20 MG/1
20 TABLET ORAL DAILY
Qty: 90 TABLET | Refills: 3 | Status: SHIPPED | OUTPATIENT
Start: 2025-03-25

## 2025-03-27 ENCOUNTER — TELEPHONE (OUTPATIENT)
Dept: FAMILY MEDICINE CLINIC | Age: 70
End: 2025-03-27

## 2025-03-27 NOTE — TELEPHONE ENCOUNTER
Medication Management Service ([facility])  [facility]  [phone]    03/27/25 8:49 AM    CLINICAL PHARMACY NOTE:    PharmD student called [employer] to check status of Ozempic PAP application. Using the automated system, PharmD student found out that patient application has been approved.    PharmD called patient and informed them that their application has been approved. Educated patient that it should be shipped to clinic in 10-14 business day, and she should receive a call from clinic when it arrives. Explained that if she does not hear anything in 2 weeks to call PharmD at [phone]. Re-educated patient to hold off on starting the Ozempic until their scheduled appointment with PharmD on 04/22/2025 so we can train her on use. If PharmD does not receive phone call from patient, will follow-up with patient/[employer] on 04/14/2025.    Quinton Antoine, LovelyD Candidate 2025    Ilir Agustin PharmD (Robbie)  PGY2 Ambulatory Care Pharmacy Resident  [facility] Medication Management Service  [phone]  03/27/25  =====================================  For Pharmacy Admin Tracking Only    Program: Medical Group  CPA in place:  Yes  Recommendation Provided To: Patient/Caregiver: 1 via Telephone  Intervention Detail: Patient Access Assistance/Sample Provided  Intervention Accepted By: Patient/Caregiver: 1  Time Spent (min): 20

## 2025-04-02 ENCOUNTER — TELEPHONE (OUTPATIENT)
Dept: SURGERY | Age: 70
End: 2025-04-02

## 2025-04-02 NOTE — TELEPHONE ENCOUNTER
Writer called the patient no answer, voicemail full to inform the patient her ozempic medication is in office

## 2025-04-22 ENCOUNTER — PHARMACY VISIT (OUTPATIENT)
Dept: FAMILY MEDICINE CLINIC | Age: 70
End: 2025-04-22

## 2025-04-22 DIAGNOSIS — E11.9 TYPE 2 DIABETES MELLITUS WITHOUT COMPLICATION, WITHOUT LONG-TERM CURRENT USE OF INSULIN (HCC): Primary | ICD-10-CM

## 2025-04-22 PROCEDURE — NBSRV NON-BILLABLE SERVICE: Performed by: PHARMACIST

## 2025-04-22 PROCEDURE — APPNB180 APP NON BILLABLE TIME > 60 MINS

## 2025-04-22 RX ORDER — LORATADINE 10 MG/1
10 TABLET ORAL DAILY
COMMUNITY

## 2025-04-22 RX ORDER — GLIPIZIDE 10 MG/1
10 TABLET ORAL
Qty: 60 TABLET | Refills: 2 | Status: SHIPPED | OUTPATIENT
Start: 2025-04-22

## 2025-04-22 RX ORDER — GLIPIZIDE 10 MG/1
10 TABLET ORAL
COMMUNITY
End: 2025-04-22 | Stop reason: SDUPTHER

## 2025-04-22 NOTE — PROGRESS NOTES
metFORMIN (GLUCOPHAGE-XR) 500 MG extended release tablet Take 2 tablets by mouth 2 times daily 120 tablet 5    levothyroxine (SYNTHROID) 100 MCG tablet Take 1 tablet by mouth daily 90 tablet 3    citalopram (CELEXA) 40 MG tablet Take 1 tablet by mouth daily 90 tablet 3    ibuprofen (ADVIL;MOTRIN) 600 MG tablet Take 1 tablet by mouth every 6 hours as needed for Pain 120 tablet 0    glipiZIDE (GLUCOTROL XL) 5 MG extended release tablet Take 1 tablet by mouth daily (Patient not taking: Reported on 3/4/2025) 30 tablet 3    vitamin D (ERGOCALCIFEROL) 1.25 MG (97719 UT) CAPS capsule Take 1 capsule by mouth once a week 4 capsule 5    alendronate (FOSAMAX) 70 MG tablet Take 1 tablet by mouth every 7 days 12 tablet 3    dapagliflozin (FARXIGA) 5 MG tablet Take 1 tablet by mouth every morning (Patient not taking: Reported on 3/4/2025) 90 tablet 1    meclizine (ANTIVERT) 25 MG tablet Take 1 tablet by mouth 3 times daily as needed for Dizziness 30 tablet 2    Lancets (ONETOUCH DELICA PLUS [id number]) MISC USE AS DIRECTED 100 each 0     No current facility-administered medications for this visit.     Additional Medications: loratadine 10 mg daily (added to medication list)    Medication reconciliation completed.     Identified medication discrepancies/issues:    Medication(s) Issue Action   Albuterol On medication list, but she explains she never took a dose of this and does not need it Removed from medication list   Alendronate On medication list, but she explains she has not taken this for a long time. Dispense record shows last dispensed in 2023 Removed from medication list   Glipizide On medication list as XR 5 mg daily, but she reports taking the IR 10 mg tablets twice daily Updated medication list   Vitamin D On medication list, but she explains she has not taken it in a long time. For now, will keep it on medication list. Educated her to ask PCP at next visit.     LIFESTYLE  Diet: Eats 2-3 meals per day (occasional skips

## 2025-04-22 NOTE — PATIENT INSTRUCTIONS
Start checking blood sugar first thing in the morning before food or drink every day and write it down in a log to bring into our next appointment  Start Ozempic 0.25 mg weekly for four doses total, then increase to 0.5 mg weekly as long as you are tolerating well  We talked about making those slight changes in your diet (increasing greens and decreasing the carbs like pasta and bread along with reducing the regular tea)

## 2025-05-22 DIAGNOSIS — R73.9 HYPERGLYCEMIA: ICD-10-CM

## 2025-05-22 DIAGNOSIS — E11.9 TYPE 2 DIABETES MELLITUS WITHOUT COMPLICATION, WITHOUT LONG-TERM CURRENT USE OF INSULIN (HCC): ICD-10-CM

## 2025-05-22 RX ORDER — METFORMIN HYDROCHLORIDE 500 MG/1
1000 TABLET, EXTENDED RELEASE ORAL 2 TIMES DAILY
Qty: 120 TABLET | Refills: 5 | Status: SHIPPED | OUTPATIENT
Start: 2025-05-22

## 2025-05-22 NOTE — TELEPHONE ENCOUNTER
Last visit: 3.4.25  Last Med refill: 4.23.25  Does patient have enough medication for 72 hours: Yes    Next Visit Date:  Future Appointments   Date Time Provider Department Center   6/3/2025  3:00 PM Imani Anderson RPH [facility] DE[facility]       Health Maintenance   Topic Date Due    Diabetic retinal exam  Never done    Colorectal Cancer Screen  Never done    Shingles vaccine (1 of 2) Never done    Respiratory Syncytial Virus (RSV) Pregnant or age 60 yrs+ (1 - Risk 60-74 years 1-dose series) Never done    Lung Cancer Screening &/or Counseling  08/18/2022    Diabetic foot exam  10/25/2023    COVID-19 Vaccine (1 - 2024-25 season) Never done    Annual Wellness Visit (Medicare Advantage)  01/01/2025    Diabetic Alb to Cr ratio (uACR) test  03/12/2025    Lipids  03/12/2025    GFR test (Diabetes, CKD 3-4, OR last GFR 15-59)  03/12/2025    A1C test (Diabetic or Prediabetic)  06/04/2025    Breast cancer screen  06/27/2025    Depression Monitoring  03/04/2026    DTaP/Tdap/Td vaccine (3 - Td or Tdap) 07/07/2033    DEXA (modify frequency per FRAX score)  Completed    Flu vaccine  Completed    Pneumococcal 50+ years Vaccine  Completed    Hepatitis C screen  Completed    Hepatitis A vaccine  Aged Out    Hepatitis B vaccine  Aged Out    Hib vaccine  Aged Out    Polio vaccine  Aged Out    Meningococcal (ACWY) vaccine  Aged Out    Meningococcal B vaccine  Aged Out    Pneumococcal 0-49 years Vaccine  Discontinued    Diabetes screen  Discontinued    HIV screen  Discontinued       Hemoglobin A1C (%)   Date Value   03/04/2025 12.0   01/28/2025 12.9   09/24/2024 10.1             ( goal A1C is < 7)   No components found for: \"LABMICR\"  No components found for: \"LDLCHOLESTEROL\", \"LDLCALC\"    (goal LDL is <100)   AST (U/L)   Date Value   03/12/2024 15     ALT (U/L)   Date Value   03/12/2024 11     BUN (mg/dL)   Date Value   03/12/2024 11     BP Readings from Last 3 Encounters:   03/04/25 109/73   01/28/25 (!) 130/90   01/17/25 124/72

## 2025-06-03 ENCOUNTER — SCHEDULED TELEPHONE ENCOUNTER (OUTPATIENT)
Age: 70
End: 2025-06-03

## 2025-06-03 DIAGNOSIS — E08.00 DIABETES MELLITUS DUE TO UNDERLYING CONDITION WITH HYPEROSMOLARITY WITHOUT COMA, UNSPECIFIED WHETHER LONG TERM INSULIN USE (HCC): ICD-10-CM

## 2025-06-03 DIAGNOSIS — E11.9 TYPE 2 DIABETES MELLITUS WITHOUT COMPLICATION, WITHOUT LONG-TERM CURRENT USE OF INSULIN (HCC): Primary | ICD-10-CM

## 2025-06-03 PROCEDURE — APPNB60 APP NON BILLABLE TIME 46-60 MINS

## 2025-06-03 PROCEDURE — NBSRV NON-BILLABLE SERVICE: Performed by: PHARMACIST

## 2025-06-03 RX ORDER — GLUCOSAMINE HCL/CHONDROITIN SU 500-400 MG
CAPSULE ORAL
Qty: 100 STRIP | Refills: 1 | Status: SHIPPED | OUTPATIENT
Start: 2025-06-03

## 2025-06-03 RX ORDER — LANCETS 33 GAUGE
EACH MISCELLANEOUS
Qty: 100 EACH | Refills: 2 | Status: SHIPPED | OUTPATIENT
Start: 2025-06-03

## 2025-06-03 RX ORDER — GLUCOSAMINE HCL/CHONDROITIN SU 500-400 MG
CAPSULE ORAL
Qty: 100 EACH | Refills: 2 | Status: SHIPPED | OUTPATIENT
Start: 2025-06-03

## 2025-06-03 NOTE — PROGRESS NOTES
(A severe event characterized by altered mental and/or physical status requiring assistance for treatment of hypoglycemia)    Diabetes Goals: Using ADA Standards of Care   Goal A1c:  Less than 7%   Fasting Blood Sugars:  80-130mg/dL  Postprandial glucose:  Less than 180mg/dL     LIPID MANAGEMENT  Current HLD Medications: atorvastatin 40 mg daily    Previous HLD Medications (Why stopped): none    Medication Adherence/Cost/Adverse Events: denies any missed or forgotten doses in the past two weeks    Lipid Targets: Using ACC/AHA, ADA, and AACE guidelines  LDL-C Target: < 55 mg/dL    A1C MONITORING  Lab Results   Component Value Date    LABA1C 12.0 03/04/2025    LABA1C 12.9 01/28/2025    LABA1C 10.1 09/24/2024     RENAL MONITORING  Lab Results   Component Value Date    MALBCR 80.6 (H) 03/04/2025     Lab Results   Component Value Date     03/04/2025    K 4.1 03/04/2025    CREATININE 0.58 03/04/2025    LABGLOM 97.9 03/04/2025     CrCl cannot be calculated (Patient's most recent lab result is older than the maximum 180 days allowed.).    LIPID MONITORING  Lab Results   Component Value Date    CHOL 146 03/12/2024    TRIG 218 (H) 03/12/2024    HDL 43 03/12/2024    LDL 60 03/12/2024     Lab Results   Component Value Date    AST 15 03/12/2024    ALT 11 03/12/2024     BLOOD PRESSURE MONITORING  BP Readings from Last 3 Encounters:   03/04/25 109/73   01/28/25 (!) 130/90   01/17/25 124/72     RISK MANAGEMENT  ASCVD RISK: Diabetes and clinical ASCVD (atherosclerosis shown on CT chest); LDL-C target < 55 mg/dL  On Statin: Yes  On ACE-I/ARB for HTN or Microalbuminuria: Yes  On GLP-1RA:Yes  On SGLT2i: No  Smoker: smoker  (1 ppd x 52 yrs)  Immunizations Needed: RSV and Shingrix  Date of last eye exam: need to assess   Date of last foot exam: need to assess  Date of last dental exam: need to assess    Assessment/Plan   Diabetes Management: Uncontrolled with most recent A1C of 12.0% (03/04/2025) not at target of < 8%. FBG

## 2025-06-25 ENCOUNTER — TELEPHONE (OUTPATIENT)
Age: 70
End: 2025-06-25

## 2025-06-25 NOTE — TELEPHONE ENCOUNTER
Patient complaining of Urinary urgency and pain. Requesting something be sent to [facility],

## 2025-07-14 DIAGNOSIS — E06.3 HYPOTHYROIDISM DUE TO HASHIMOTO'S THYROIDITIS: ICD-10-CM

## 2025-07-14 RX ORDER — LEVOTHYROXINE SODIUM 100 UG/1
100 TABLET ORAL DAILY
Qty: 90 TABLET | Refills: 3 | Status: SHIPPED | OUTPATIENT
Start: 2025-07-14 | End: 2026-07-09

## 2025-07-14 NOTE — TELEPHONE ENCOUNTER
Last visit: 6.3.25  Last Med refill: ?  Does patient have enough medication for 72 hours: Yes    Next Visit Date:  Future Appointments   Date Time Provider Department Center   7/22/2025  1:00 PM Imani Anderson RPH [facility]       Health Maintenance   Topic Date Due    Diabetic retinal exam  Never done    Colorectal Cancer Screen  Never done    Shingles vaccine (1 of 2) Never done    Respiratory Syncytial Virus (RSV) Pregnant or age 60 yrs+ (1 - Risk 60-74 years 1-dose series) Never done    Lung Cancer Screening &/or Counseling  08/18/2022    Diabetic foot exam  10/25/2023    COVID-19 Vaccine (1 - 2024-25 season) Never done    Annual Wellness Visit (Medicare Advantage)  01/01/2025    Diabetic Alb to Cr ratio (uACR) test  03/12/2025    Lipids  03/12/2025    GFR test (Diabetes, CKD 3-4, OR last GFR 15-59)  03/12/2025    A1C test (Diabetic or Prediabetic)  06/04/2025    Breast cancer screen  06/27/2025    Flu vaccine (1) 08/01/2025    Depression Monitoring  03/04/2026    DTaP/Tdap/Td vaccine (3 - Td or Tdap) 07/07/2033    DEXA (modify frequency per FRAX score)  Completed    Pneumococcal 50+ years Vaccine  Completed    Hepatitis C screen  Completed    Hepatitis A vaccine  Aged Out    Hepatitis B vaccine  Aged Out    Hib vaccine  Aged Out    Polio vaccine  Aged Out    Meningococcal (ACWY) vaccine  Aged Out    Meningococcal B vaccine  Aged Out    Pneumococcal 0-49 years Vaccine  Discontinued    Diabetes screen  Discontinued    HIV screen  Discontinued       Hemoglobin A1C (%)   Date Value   03/04/2025 12.0   01/28/2025 12.9   09/24/2024 10.1             ( goal A1C is < 7)   No components found for: \"LABMICR\"  No components found for: \"LDLCHOLESTEROL\", \"LDLCALC\"    (goal LDL is <100)   AST (U/L)   Date Value   03/12/2024 15     ALT (U/L)   Date Value   03/12/2024 11     BUN (mg/dL)   Date Value   03/12/2024 11     BP Readings from Last 3 Encounters:   03/04/25 109/73   01/28/25 (!) 130/90   01/17/25 124/72

## 2025-07-18 ENCOUNTER — TELEPHONE (OUTPATIENT)
Age: 70
End: 2025-07-18

## 2025-07-22 ENCOUNTER — SCHEDULED TELEPHONE ENCOUNTER (OUTPATIENT)
Age: 70
End: 2025-07-22

## 2025-07-22 DIAGNOSIS — E11.9 TYPE 2 DIABETES MELLITUS WITHOUT COMPLICATION, WITHOUT LONG-TERM CURRENT USE OF INSULIN (HCC): Primary | ICD-10-CM

## 2025-07-22 PROCEDURE — APPNB30 APP NON BILLABLE TIME 0-30 MINS: Performed by: PHARMACIST

## 2025-07-22 NOTE — PROGRESS NOTES
8%. Only 1 glucose reading available today due to glucometer not working properly.  Patient reports one fasting blood glucose reading of 159mg/dL on 07/15/2025. Denies hypoglycemia. Currently on metformin, Ozempic, and glipizide. Tolerating Ozempic; notices it is worse if eating a larger meal and better if eating smaller portions.  With limited blood glucose readings available today will not make any adjustments with DM mediations.  Patient will complete ordered blood work which contains an A1c.  Order placed for a new glucometer.      Continue Ozempic 0.5 mg weekly, glipizide 10 mg twice daily, and metformin  mg; two tablets twice daily  Continue checking fasting blood sugar every morning and writing values in a log  Glucometer order sent to [facility] for new meter.  Patient prefers [employer] Verio as she already has test strips at home.    Obtain A1c, lipids and CMP; orders in    Future Considerations: Ozempic titration; SGLT2i consideration (w/ PAP); sulfonylurea de-escalation (if possible)    ASCVD Risk Reduction: Patient has diabetes and clinical ASCVD. Most recent LDL-C of 60 mg/dL (03/12/2024) is not at target of of < 55 mg/dL. Currently on atorvastatin 40 mg daily. Lipid panel is over a year old; will plan to reassess cholesterol.    Continue atorvastatin 40 mg daily  Obtain lipid panel; order in    Future Considerations: Statin titration (if needed)    Next PharmD Visit: 09/23/2025@ 9:00AM, Phone Consult  Next PCP Visit: Recommended that patient make PCP appointment.  Noted that appointment has been scheduled for 09/22/2025.    Patient verbalized understanding of care plan. Patient advised to call Medication Management with any questions, concerns, or changes prior to next appointment.    Progress note sent to referring provider. (Dr. Olivas)   Patient acknowledges working in a consult agreement with the pharmacist as referred by his/her physician.     Imani Anderson, Pharm.D., [facility]  Clinical

## 2025-07-29 ENCOUNTER — HOSPITAL ENCOUNTER (OUTPATIENT)
Age: 70
Setting detail: SPECIMEN
Discharge: HOME | End: 2025-07-29

## 2025-07-29 DIAGNOSIS — E08.00 DIABETES MELLITUS DUE TO UNDERLYING CONDITION WITH HYPEROSMOLARITY WITHOUT COMA, UNSPECIFIED WHETHER LONG TERM INSULIN USE (HCC): ICD-10-CM

## 2025-07-29 LAB
ALBUMIN SERPL-MCNC: 4.2 G/DL (ref 3.5–5.2)
ALBUMIN/GLOB SERPL: 1.6 {RATIO} (ref 1–2.5)
ALP SERPL-CCNC: 64 U/L (ref 35–104)
ALT SERPL-CCNC: 8 U/L (ref 10–35)
ANION GAP SERPL CALCULATED.3IONS-SCNC: 13 MMOL/L (ref 9–16)
AST SERPL-CCNC: 15 U/L (ref 10–35)
BILIRUB SERPL-MCNC: 0.3 MG/DL (ref 0–1.2)
BUN SERPL-MCNC: 12 MG/DL (ref 8–23)
CALCIUM SERPL-MCNC: 9.2 MG/DL (ref 8.6–10.4)
CHLORIDE SERPL-SCNC: 104 MMOL/L (ref 98–107)
CHOLEST SERPL-MCNC: 105 MG/DL (ref 0–199)
CHOLESTEROL/HDL RATIO: 2.7
CO2 SERPL-SCNC: 24 MMOL/L (ref 20–31)
CREAT SERPL-MCNC: 0.5 MG/DL (ref 0.6–0.9)
EST. AVERAGE GLUCOSE BLD GHB EST-MCNC: 157 MG/DL
GFR, ESTIMATED: >90 ML/MIN/1.73M2
GLUCOSE SERPL-MCNC: 106 MG/DL (ref 74–99)
HBA1C MFR BLD: 7.1 % (ref 4–6)
HDLC SERPL-MCNC: 39 MG/DL
LDLC SERPL CALC-MCNC: 45 MG/DL (ref 0–100)
POTASSIUM SERPL-SCNC: 3.6 MMOL/L (ref 3.7–5.3)
PROT SERPL-MCNC: 6.9 G/DL (ref 6.6–8.7)
SODIUM SERPL-SCNC: 141 MMOL/L (ref 136–145)
TRIGL SERPL-MCNC: 107 MG/DL
VLDLC SERPL CALC-MCNC: 21 MG/DL (ref 1–30)

## 2025-08-01 RX ORDER — BLOOD-GLUCOSE METER
KIT MISCELLANEOUS
Qty: 1 KIT | Refills: 0 | Status: SHIPPED | OUTPATIENT
Start: 2025-08-01

## 2025-08-04 DIAGNOSIS — E87.6 HYPOKALEMIA: Primary | ICD-10-CM

## 2025-08-04 RX ORDER — POTASSIUM CHLORIDE 1500 MG/1
20 TABLET, EXTENDED RELEASE ORAL DAILY
Qty: 30 TABLET | Refills: 0 | Status: SHIPPED | OUTPATIENT
Start: 2025-08-04

## 2025-09-03 DIAGNOSIS — E06.3 HYPOTHYROIDISM DUE TO HASHIMOTO'S THYROIDITIS: ICD-10-CM

## 2025-09-03 DIAGNOSIS — E87.6 HYPOKALEMIA: ICD-10-CM

## 2025-09-03 RX ORDER — POTASSIUM CHLORIDE 1500 MG/1
20 TABLET, EXTENDED RELEASE ORAL DAILY
Qty: 30 TABLET | Refills: 0 | Status: SHIPPED | OUTPATIENT
Start: 2025-09-03

## 2025-09-03 RX ORDER — LEVOTHYROXINE SODIUM 100 UG/1
100 TABLET ORAL DAILY
Qty: 90 TABLET | Refills: 3 | Status: SHIPPED | OUTPATIENT
Start: 2025-09-03 | End: 2026-08-29